# Patient Record
Sex: FEMALE | Race: OTHER | HISPANIC OR LATINO | URBAN - METROPOLITAN AREA
[De-identification: names, ages, dates, MRNs, and addresses within clinical notes are randomized per-mention and may not be internally consistent; named-entity substitution may affect disease eponyms.]

---

## 2017-01-06 ENCOUNTER — EMERGENCY (EMERGENCY)
Facility: HOSPITAL | Age: 82
LOS: 1 days | Discharge: HOME | End: 2017-01-06
Attending: EMERGENCY MEDICINE | Admitting: EMERGENCY MEDICINE
Payer: MEDICARE

## 2017-01-06 VITALS
TEMPERATURE: 98 F | RESPIRATION RATE: 16 BRPM | OXYGEN SATURATION: 97 % | SYSTOLIC BLOOD PRESSURE: 75 MMHG | HEART RATE: 76 BPM | DIASTOLIC BLOOD PRESSURE: 45 MMHG

## 2017-01-06 DIAGNOSIS — I95.9 HYPOTENSION, UNSPECIFIED: ICD-10-CM

## 2017-01-06 DIAGNOSIS — N18.9 CHRONIC KIDNEY DISEASE, UNSPECIFIED: ICD-10-CM

## 2017-01-06 DIAGNOSIS — F41.9 ANXIETY DISORDER, UNSPECIFIED: ICD-10-CM

## 2017-01-06 DIAGNOSIS — F03.90 UNSPECIFIED DEMENTIA, UNSPECIFIED SEVERITY, WITHOUT BEHAVIORAL DISTURBANCE, PSYCHOTIC DISTURBANCE, MOOD DISTURBANCE, AND ANXIETY: ICD-10-CM

## 2017-01-06 DIAGNOSIS — I50.9 HEART FAILURE, UNSPECIFIED: ICD-10-CM

## 2017-01-06 DIAGNOSIS — Z79.82 LONG TERM (CURRENT) USE OF ASPIRIN: ICD-10-CM

## 2017-01-06 DIAGNOSIS — I48.91 UNSPECIFIED ATRIAL FIBRILLATION: ICD-10-CM

## 2017-01-06 DIAGNOSIS — I10 ESSENTIAL (PRIMARY) HYPERTENSION: ICD-10-CM

## 2017-01-06 DIAGNOSIS — D50.9 IRON DEFICIENCY ANEMIA, UNSPECIFIED: ICD-10-CM

## 2017-01-06 DIAGNOSIS — M06.9 RHEUMATOID ARTHRITIS, UNSPECIFIED: ICD-10-CM

## 2017-01-06 PROCEDURE — 99283 EMERGENCY DEPT VISIT LOW MDM: CPT

## 2017-01-06 PROCEDURE — 99283 EMERGENCY DEPT VISIT LOW MDM: CPT | Mod: 25

## 2017-01-06 NOTE — ED ADULT NURSE NOTE - PMH
Atrial fibrillation    Chronic kidney disease  stage 3 moderate  Chronic pain    Constipation    Dementia    Dry eye    Generalized anxiety disorder    GERD (gastroesophageal reflux disease)    Heart failure    HTN (hypertension)    Iron deficiency anemia    RA (rheumatoid arthritis)

## 2017-01-06 NOTE — ED ADULT NURSE NOTE - OBJECTIVE STATEMENT
received pt via EMS awake with low B?P and sent to ER for low B?P pt DNR AND DNI and no IV FLUID  as per  DNR form  and pt came to ER with left arm iv fluids in progress by EMS awaiting evaluation

## 2017-01-07 VITALS — SYSTOLIC BLOOD PRESSURE: 85 MMHG | DIASTOLIC BLOOD PRESSURE: 54 MMHG

## 2017-01-07 NOTE — ED PROVIDER NOTE - MEDICAL DECISION MAKING DETAILS
Patient with very clear MOLST form that did not want many medical interventions. Patient will be transferred back to rehabilitation facility.

## 2017-01-07 NOTE — ED PROVIDER NOTE - OBJECTIVE STATEMENT
85 year old female with a CHF, CKD, AFib comes to the ER from [facility] with the presenting symptom of hypotension. The patient herself has no complaints and feels well. Patient comes with a MOLST form stating no blood draws, no IVF, and comfort measures only. Patient currently is able to communicate clearly that she is not in any discomfort.

## 2017-01-07 NOTE — ED ADULT NURSE REASSESSMENT NOTE - NS ED NURSE REASSESS COMMENT FT1
pt sleeping with no c/o of disconfort vital signsimproved awaiting disposition
MD  spoke  to family member and pt returning  back to facility via EMS

## 2017-01-07 NOTE — ED PROVIDER NOTE - PROGRESS NOTE DETAILS
Had a prolonged conversation with the patient's daughter regarding goals of care. Given that patient is not in any distress and feeling well, we discussed her being sent back to [facility]. Daughter was aware that mother is ill, however, was in agreement that her mother would have wanted to not be admitted or seek anything other than comfort care. Patient will be sent back to [facility].

## 2018-02-06 ENCOUNTER — INPATIENT (INPATIENT)
Facility: HOSPITAL | Age: 83
LOS: 8 days | Discharge: NURSING, REHAB OR LONG-TERM CARE | DRG: 689 | End: 2018-02-15
Attending: INTERNAL MEDICINE | Admitting: INTERNAL MEDICINE
Payer: MEDICARE

## 2018-02-06 VITALS
WEIGHT: 89.95 LBS | SYSTOLIC BLOOD PRESSURE: 131 MMHG | HEART RATE: 106 BPM | HEIGHT: 57 IN | TEMPERATURE: 101 F | OXYGEN SATURATION: 94 % | DIASTOLIC BLOOD PRESSURE: 72 MMHG

## 2018-02-06 DIAGNOSIS — N30.00 ACUTE CYSTITIS WITHOUT HEMATURIA: ICD-10-CM

## 2018-02-06 DIAGNOSIS — R56.00 SIMPLE FEBRILE CONVULSIONS: ICD-10-CM

## 2018-02-06 DIAGNOSIS — I10 ESSENTIAL (PRIMARY) HYPERTENSION: ICD-10-CM

## 2018-02-06 DIAGNOSIS — G93.41 METABOLIC ENCEPHALOPATHY: ICD-10-CM

## 2018-02-06 DIAGNOSIS — I48.2 CHRONIC ATRIAL FIBRILLATION: ICD-10-CM

## 2018-02-06 DIAGNOSIS — I69.319 UNSPECIFIED SYMPTOMS AND SIGNS INVOLVING COGNITIVE FUNCTIONS FOLLOWING CEREBRAL INFARCTION: ICD-10-CM

## 2018-02-06 DIAGNOSIS — I42.9 CARDIOMYOPATHY, UNSPECIFIED: ICD-10-CM

## 2018-02-06 DIAGNOSIS — R41.82 ALTERED MENTAL STATUS, UNSPECIFIED: ICD-10-CM

## 2018-02-06 PROBLEM — D50.9 IRON DEFICIENCY ANEMIA, UNSPECIFIED: Chronic | Status: ACTIVE | Noted: 2017-01-06

## 2018-02-06 PROBLEM — I48.91 UNSPECIFIED ATRIAL FIBRILLATION: Chronic | Status: ACTIVE | Noted: 2017-01-06

## 2018-02-06 PROBLEM — K59.00 CONSTIPATION, UNSPECIFIED: Chronic | Status: ACTIVE | Noted: 2017-01-06

## 2018-02-06 PROBLEM — I50.9 HEART FAILURE, UNSPECIFIED: Chronic | Status: ACTIVE | Noted: 2017-01-06

## 2018-02-06 PROBLEM — M06.9 RHEUMATOID ARTHRITIS, UNSPECIFIED: Chronic | Status: ACTIVE | Noted: 2017-01-06

## 2018-02-06 PROBLEM — N18.9 CHRONIC KIDNEY DISEASE, UNSPECIFIED: Chronic | Status: ACTIVE | Noted: 2017-01-06

## 2018-02-06 PROBLEM — G89.29 OTHER CHRONIC PAIN: Chronic | Status: ACTIVE | Noted: 2017-01-06

## 2018-02-06 PROBLEM — K21.9 GASTRO-ESOPHAGEAL REFLUX DISEASE WITHOUT ESOPHAGITIS: Chronic | Status: ACTIVE | Noted: 2017-01-06

## 2018-02-06 PROBLEM — F03.90 UNSPECIFIED DEMENTIA, UNSPECIFIED SEVERITY, WITHOUT BEHAVIORAL DISTURBANCE, PSYCHOTIC DISTURBANCE, MOOD DISTURBANCE, AND ANXIETY: Chronic | Status: ACTIVE | Noted: 2017-01-06

## 2018-02-06 PROBLEM — H04.129 DRY EYE SYNDROME OF UNSPECIFIED LACRIMAL GLAND: Chronic | Status: ACTIVE | Noted: 2017-01-06

## 2018-02-06 PROBLEM — F41.1 GENERALIZED ANXIETY DISORDER: Chronic | Status: ACTIVE | Noted: 2017-01-06

## 2018-02-06 LAB
ALBUMIN SERPL ELPH-MCNC: 3.1 G/DL — LOW (ref 3.3–5)
ALP SERPL-CCNC: 57 U/L — SIGNIFICANT CHANGE UP (ref 30–120)
ALT FLD-CCNC: 24 U/L DA — SIGNIFICANT CHANGE UP (ref 10–60)
ANION GAP SERPL CALC-SCNC: 9 MMOL/L — SIGNIFICANT CHANGE UP (ref 5–17)
APPEARANCE UR: CLEAR — SIGNIFICANT CHANGE UP
APTT BLD: 30.9 SEC — SIGNIFICANT CHANGE UP (ref 27.5–37.4)
AST SERPL-CCNC: 17 U/L — SIGNIFICANT CHANGE UP (ref 10–40)
BASOPHILS # BLD AUTO: 0.1 K/UL — SIGNIFICANT CHANGE UP (ref 0–0.2)
BASOPHILS NFR BLD AUTO: 1.5 % — SIGNIFICANT CHANGE UP (ref 0–2)
BILIRUB SERPL-MCNC: 0.4 MG/DL — SIGNIFICANT CHANGE UP (ref 0.2–1.2)
BILIRUB UR-MCNC: NEGATIVE — SIGNIFICANT CHANGE UP
BUN SERPL-MCNC: 28 MG/DL — HIGH (ref 7–23)
CALCIUM SERPL-MCNC: 8.3 MG/DL — LOW (ref 8.4–10.5)
CHLORIDE SERPL-SCNC: 111 MMOL/L — HIGH (ref 96–108)
CO2 SERPL-SCNC: 27 MMOL/L — SIGNIFICANT CHANGE UP (ref 22–31)
COLOR SPEC: YELLOW — SIGNIFICANT CHANGE UP
CREAT SERPL-MCNC: 1.2 MG/DL — SIGNIFICANT CHANGE UP (ref 0.5–1.3)
DIFF PNL FLD: ABNORMAL
EOSINOPHIL # BLD AUTO: 0 K/UL — SIGNIFICANT CHANGE UP (ref 0–0.5)
EOSINOPHIL NFR BLD AUTO: 0.8 % — SIGNIFICANT CHANGE UP (ref 0–6)
FLUAV SPEC QL CULT: NEGATIVE — SIGNIFICANT CHANGE UP
FLUBV AG SPEC QL IA: NEGATIVE — SIGNIFICANT CHANGE UP
GLUCOSE SERPL-MCNC: 130 MG/DL — HIGH (ref 70–99)
GLUCOSE UR QL: NEGATIVE MG/DL — SIGNIFICANT CHANGE UP
HCT VFR BLD CALC: 30.2 % — LOW (ref 34.5–45)
HGB BLD-MCNC: 10 G/DL — LOW (ref 11.5–15.5)
INR BLD: 1.08 RATIO — SIGNIFICANT CHANGE UP (ref 0.88–1.16)
KETONES UR-MCNC: NEGATIVE — SIGNIFICANT CHANGE UP
LACTATE SERPL-SCNC: 1.4 MMOL/L — SIGNIFICANT CHANGE UP (ref 0.7–2)
LEUKOCYTE ESTERASE UR-ACNC: ABNORMAL
LYMPHOCYTES # BLD AUTO: 0.6 K/UL — LOW (ref 1–3.3)
LYMPHOCYTES # BLD AUTO: 11.7 % — LOW (ref 13–44)
MCHC RBC-ENTMCNC: 30.5 PG — SIGNIFICANT CHANGE UP (ref 27–34)
MCHC RBC-ENTMCNC: 33.2 GM/DL — SIGNIFICANT CHANGE UP (ref 32–36)
MCV RBC AUTO: 91.9 FL — SIGNIFICANT CHANGE UP (ref 80–100)
MONOCYTES # BLD AUTO: 0.2 K/UL — SIGNIFICANT CHANGE UP (ref 0–0.9)
MONOCYTES NFR BLD AUTO: 4.7 % — SIGNIFICANT CHANGE UP (ref 2–14)
NEUTROPHILS # BLD AUTO: 3.9 K/UL — SIGNIFICANT CHANGE UP (ref 1.8–7.4)
NEUTROPHILS NFR BLD AUTO: 81.2 % — HIGH (ref 43–77)
NITRITE UR-MCNC: POSITIVE
PH UR: 6 — SIGNIFICANT CHANGE UP (ref 5–8)
PLATELET # BLD AUTO: 138 K/UL — LOW (ref 150–400)
POTASSIUM SERPL-MCNC: 4.1 MMOL/L — SIGNIFICANT CHANGE UP (ref 3.5–5.3)
POTASSIUM SERPL-SCNC: 4.1 MMOL/L — SIGNIFICANT CHANGE UP (ref 3.5–5.3)
PROT SERPL-MCNC: 6.5 G/DL — SIGNIFICANT CHANGE UP (ref 6–8.3)
PROT UR-MCNC: 100 MG/DL
PROTHROM AB SERPL-ACNC: 11.8 SEC — SIGNIFICANT CHANGE UP (ref 9.8–12.7)
RBC # BLD: 3.29 M/UL — LOW (ref 3.8–5.2)
RBC # FLD: 13.2 % — SIGNIFICANT CHANGE UP (ref 10.3–14.5)
SODIUM SERPL-SCNC: 147 MMOL/L — HIGH (ref 135–145)
SP GR SPEC: 1.01 — SIGNIFICANT CHANGE UP (ref 1.01–1.02)
UROBILINOGEN FLD QL: NEGATIVE MG/DL — SIGNIFICANT CHANGE UP
WBC # BLD: 4.8 K/UL — SIGNIFICANT CHANGE UP (ref 3.8–10.5)
WBC # FLD AUTO: 4.8 K/UL — SIGNIFICANT CHANGE UP (ref 3.8–10.5)

## 2018-02-06 PROCEDURE — 99223 1ST HOSP IP/OBS HIGH 75: CPT | Mod: AI

## 2018-02-06 PROCEDURE — 70450 CT HEAD/BRAIN W/O DYE: CPT | Mod: 26

## 2018-02-06 PROCEDURE — 93010 ELECTROCARDIOGRAM REPORT: CPT

## 2018-02-06 PROCEDURE — 99223 1ST HOSP IP/OBS HIGH 75: CPT

## 2018-02-06 PROCEDURE — 99285 EMERGENCY DEPT VISIT HI MDM: CPT

## 2018-02-06 PROCEDURE — 71045 X-RAY EXAM CHEST 1 VIEW: CPT | Mod: 26

## 2018-02-06 RX ORDER — ASPIRIN/CALCIUM CARB/MAGNESIUM 324 MG
300 TABLET ORAL ONCE
Qty: 0 | Refills: 0 | Status: COMPLETED | OUTPATIENT
Start: 2018-02-06 | End: 2018-02-06

## 2018-02-06 RX ORDER — ACETAMINOPHEN 500 MG
650 TABLET ORAL ONCE
Qty: 0 | Refills: 0 | Status: COMPLETED | OUTPATIENT
Start: 2018-02-06 | End: 2018-02-06

## 2018-02-06 RX ORDER — ASPIRIN/CALCIUM CARB/MAGNESIUM 324 MG
325 TABLET ORAL DAILY
Qty: 0 | Refills: 0 | Status: DISCONTINUED | OUTPATIENT
Start: 2018-02-06 | End: 2018-02-06

## 2018-02-06 RX ORDER — SODIUM CHLORIDE 9 MG/ML
3 INJECTION INTRAMUSCULAR; INTRAVENOUS; SUBCUTANEOUS ONCE
Qty: 0 | Refills: 0 | Status: COMPLETED | OUTPATIENT
Start: 2018-02-06 | End: 2018-02-06

## 2018-02-06 RX ORDER — METOPROLOL TARTRATE 50 MG
2.5 TABLET ORAL EVERY 6 HOURS
Qty: 0 | Refills: 0 | Status: DISCONTINUED | OUTPATIENT
Start: 2018-02-06 | End: 2018-02-09

## 2018-02-06 RX ORDER — VANCOMYCIN HCL 1 G
750 VIAL (EA) INTRAVENOUS ONCE
Qty: 0 | Refills: 0 | Status: COMPLETED | OUTPATIENT
Start: 2018-02-06 | End: 2018-02-06

## 2018-02-06 RX ORDER — SODIUM CHLORIDE 9 MG/ML
1000 INJECTION INTRAMUSCULAR; INTRAVENOUS; SUBCUTANEOUS ONCE
Qty: 0 | Refills: 0 | Status: COMPLETED | OUTPATIENT
Start: 2018-02-06 | End: 2018-02-06

## 2018-02-06 RX ORDER — SODIUM CHLORIDE 9 MG/ML
1000 INJECTION, SOLUTION INTRAVENOUS
Qty: 0 | Refills: 0 | Status: DISCONTINUED | OUTPATIENT
Start: 2018-02-06 | End: 2018-02-08

## 2018-02-06 RX ORDER — HEPARIN SODIUM 5000 [USP'U]/ML
5000 INJECTION INTRAVENOUS; SUBCUTANEOUS EVERY 12 HOURS
Qty: 0 | Refills: 0 | Status: DISCONTINUED | OUTPATIENT
Start: 2018-02-06 | End: 2018-02-15

## 2018-02-06 RX ORDER — CEFTRIAXONE 500 MG/1
1 INJECTION, POWDER, FOR SOLUTION INTRAMUSCULAR; INTRAVENOUS EVERY 24 HOURS
Qty: 0 | Refills: 0 | Status: COMPLETED | OUTPATIENT
Start: 2018-02-06 | End: 2018-02-08

## 2018-02-06 RX ORDER — CEFTRIAXONE 500 MG/1
2 INJECTION, POWDER, FOR SOLUTION INTRAMUSCULAR; INTRAVENOUS ONCE
Qty: 0 | Refills: 0 | Status: COMPLETED | OUTPATIENT
Start: 2018-02-06 | End: 2018-02-06

## 2018-02-06 RX ORDER — ASPIRIN/CALCIUM CARB/MAGNESIUM 324 MG
325 TABLET ORAL DAILY
Qty: 0 | Refills: 0 | Status: DISCONTINUED | OUTPATIENT
Start: 2018-02-06 | End: 2018-02-09

## 2018-02-06 RX ADMIN — Medication 650 MILLIGRAM(S): at 05:20

## 2018-02-06 RX ADMIN — SODIUM CHLORIDE 1000 MILLILITER(S): 9 INJECTION INTRAMUSCULAR; INTRAVENOUS; SUBCUTANEOUS at 06:20

## 2018-02-06 RX ADMIN — SODIUM CHLORIDE 3 MILLILITER(S): 9 INJECTION INTRAMUSCULAR; INTRAVENOUS; SUBCUTANEOUS at 06:03

## 2018-02-06 RX ADMIN — SODIUM CHLORIDE 50 MILLILITER(S): 9 INJECTION, SOLUTION INTRAVENOUS at 10:35

## 2018-02-06 RX ADMIN — Medication 2.5 MILLIGRAM(S): at 17:56

## 2018-02-06 RX ADMIN — SODIUM CHLORIDE 1000 MILLILITER(S): 9 INJECTION INTRAMUSCULAR; INTRAVENOUS; SUBCUTANEOUS at 05:20

## 2018-02-06 RX ADMIN — CEFTRIAXONE 100 GRAM(S): 500 INJECTION, POWDER, FOR SOLUTION INTRAMUSCULAR; INTRAVENOUS at 07:20

## 2018-02-06 RX ADMIN — Medication 300 MILLIGRAM(S): at 10:36

## 2018-02-06 RX ADMIN — HEPARIN SODIUM 5000 UNIT(S): 5000 INJECTION INTRAVENOUS; SUBCUTANEOUS at 17:56

## 2018-02-06 RX ADMIN — Medication 150 MILLIGRAM(S): at 07:58

## 2018-02-06 RX ADMIN — Medication 0.5 MILLIGRAM(S): at 12:20

## 2018-02-06 NOTE — ED ADULT NURSE NOTE - OBJECTIVE STATEMENT
pt BIBA from [facility] , s/p seizure at 0345, EMS arrived at the scene at 0412 given Midazolam 10mg  IM, seizure  subsided at 0417.  upon arrival pt unresponsive, on CM, VSS. respiration even and unlabored,

## 2018-02-06 NOTE — CONSULT NOTE ADULT - ATTENDING COMMENTS
Advanced care planning was discussed with family. Pt is DNR/DNI.  Pain is accessed and addressed. Pt has no pain currently.  Pt was screened for signs of clinical depression. No signs of depression.  Risk of falls accessed. Fall prevention and plan of care is in place.  Pt is screened for tobacco and alcohol use. Pt doesn't smoke or drink.  Use of narcotic pain meds was discussed. Pt is advised to use narcotic meds wisely and to refrain from over using them.  Plan of care was discussed with patient family. Questions answered.  Would continue to follow.

## 2018-02-06 NOTE — H&P ADULT - ASSESSMENT
86 y.o. F found by NH having seizure episode - no h/o seizures in the past - EMS gave versed 10mg IM in the ambulance with seizure halting about 2minutes later, had already been at least 20minutes of seizure episode by then, in ED found to be febrile, information limited, pt has AMS, spoke with daughter  - MOLST states DNR/DNI/no hospitalizations/limited interventions - at this time daughter wants everything done, but not sure about invasive studies, discussed LP, will start with labs/ct head/give 1st dose abx and rediscuss with daughter.  In ED pt was found to have Right sided weakness and she was not able to speak..  NIHSS couldn't be accessed accurately.  Pt is not a tPa candidate as her symptoms onset time is NOT known.  Daughter is at bedside.  In ED pt has elevated serum Na. Volume depletion and also signs of UTI.

## 2018-02-06 NOTE — H&P ADULT - NSHPSOCIALHISTORY_GEN_ALL_CORE
SOCIAL HISTORY:  Smoker:  NO        PACK YEARS:                         WHEN QUIT?  ETOH use:  NO               FREQUENCY / QUANTITY:  Ilicit Drug use:  NO

## 2018-02-06 NOTE — ED PROVIDER NOTE - MEDICAL DECISION MAKING DETAILS
86 y.o. F BIBEMS with new onset seizure, found to be febrile in ED, sepsis work up, abx, 86 y.o. F BIBEMS with new onset seizure, found to be febrile in ED, sepsis work up, abx, admit

## 2018-02-06 NOTE — H&P ADULT - PROBLEM SELECTOR PLAN 1
CT Head - No acute pathology. Old Infarcts.  CVA in Left MCA area clinically.  Admit to monitored bed.  NPO   SD  Pt is DNR/DNI.  Limited CVA w/up will be done with agreement of daughter at bedside.  Would do repeat CT head in 48 hrs.  Carotid doppler/2D Echo will NOT be done.  Lipid panel/HbA1c.  Dysphagia eval.  PT Eval/Speech and swallowing eval.  PT Eval. CT Head - No acute pathology. Old Infarcts.  CVA in Left MCA area clinically.  Admit to monitored bed.  NPO   GA  Pt is DNR/DNI.  Limited CVA w/up will be done with agreement of daughter at bedside.  Would do repeat CT head in 48 hrs.  Carotid doppler/2D Echo will NOT be done.  Lipid panel/HbA1c.  Dysphagia eval.  PT Eval/Speech and swallowing eval.  PT Eval.  Febrile seizure- due to infection.

## 2018-02-06 NOTE — ED ADULT TRIAGE NOTE - BP NONINVASIVE DIASTOLIC (MM HG)
Post Anesthetic Evaluation


Cardiovascular Status: Normal, Stable


Respiratory Status: Normal, Stable


Level of Consciousness/Mental Status: Other, See Comment (Pt is demented and 

extremely hard of hearing. Difficult to communicate.)


Pain Control: Adequate, Prn Tx Ordered


Nausea/Vomiting Control: Adequate, Prn Tx Ordered


Complications Possibly Related to Anesthesia: None Noted
72

## 2018-02-06 NOTE — CONSULT NOTE ADULT - PROBLEM SELECTOR RECOMMENDATION 4
mild chronic global hypokinesis , no clinical CHF , continue IV lopressor for now ,     will add oral BB ,ACE once her mental status improves     Patient is DNR DNI , supportive care

## 2018-02-06 NOTE — ED ADULT NURSE REASSESSMENT NOTE - NS ED NURSE REASSESS COMMENT FT1
pt responding to verbal stimuli, CT scan done, VSS, no distress noted, on o2 2L N/C, spo2 96%, will monitor.

## 2018-02-06 NOTE — H&P ADULT - MENTAL STATUS
Pt is awake and confused. Moves left sided extremities equally. Right UE 0/5.   Pt withdraws all extremities equally on stimulation.   Gait - Couldn't be accessed.  Speech - Non verbal currently.

## 2018-02-06 NOTE — GOALS OF CARE CONVERSATION - PERSONAL ADVANCE DIRECTIVE - CONVERSATION DETAILS
Spoke to pts daughter regarding her condition and advance directives. She states that she is still DNR/DNI and does not want very aggressive treatment. We discussed comfort measures only but she wants to give the pt a day to see how pt progresses before making any decisions

## 2018-02-06 NOTE — H&P ADULT - ATTENDING COMMENTS
D/w daughter at bedside. Questions answered.  prognosis is poor.  Advance care planning d/w pt. Time spent- 15 minutes. D/w daughter at bedside. Questions answered.  prognosis is poor.  Advance care planning d/w pt. Time spent- 15 minutes.  PMD notified.

## 2018-02-06 NOTE — ED PROVIDER NOTE - PMH
Age related osteoporosis    Atrial fibrillation    Atrial fibrillation    Chronic kidney disease  stage 3 moderate  Chronic pain    Constipation    Dementia    Dry eye    Generalized anxiety disorder    GERD (gastroesophageal reflux disease)    Heart failure    HTN (hypertension)    HTN (hypertension)    Hyperlipidemia    Iron deficiency anemia    Osteoarthritis    RA (rheumatoid arthritis)    Subdural hematoma

## 2018-02-06 NOTE — ED PROVIDER NOTE - OBJECTIVE STATEMENT
86 y.o. F found by NH having seizure episode - no h/o seizures in the past - EMS gave versed 10mg IM in the ambulance with seizure halting about 2minutes later, had already been at least 20minutes of seizure episode by then, in ED found to be febrile, information limited, pt unresponsive at this time, spoke with daughter - HCP Maida Perea on the phone - MOLST states DNR/DNI/no hospitalizations/limited interventions - at this time daughter wants everything done, but not sure about invasive studies, discussed LP, will start with labs/ct head/give 1st dose abx and rediscuss with daughter

## 2018-02-06 NOTE — PATIENT PROFILE ADULT. - LANGUAGE ASSISTANCE NEEDED
No-Patient/Caregiver offered and refused free interpretation services. confused/No-Patient/Caregiver offered and refused free interpretation services.

## 2018-02-07 DIAGNOSIS — I42.8 OTHER CARDIOMYOPATHIES: ICD-10-CM

## 2018-02-07 LAB
ANION GAP SERPL CALC-SCNC: 10 MMOL/L — SIGNIFICANT CHANGE UP (ref 5–17)
BUN SERPL-MCNC: 14 MG/DL — SIGNIFICANT CHANGE UP (ref 7–23)
CALCIUM SERPL-MCNC: 8.2 MG/DL — LOW (ref 8.4–10.5)
CHLORIDE SERPL-SCNC: 109 MMOL/L — HIGH (ref 96–108)
CO2 SERPL-SCNC: 24 MMOL/L — SIGNIFICANT CHANGE UP (ref 22–31)
CREAT SERPL-MCNC: 0.92 MG/DL — SIGNIFICANT CHANGE UP (ref 0.5–1.3)
GLUCOSE SERPL-MCNC: 111 MG/DL — HIGH (ref 70–99)
HCT VFR BLD CALC: 32.4 % — LOW (ref 34.5–45)
HGB BLD-MCNC: 10.4 G/DL — LOW (ref 11.5–15.5)
MCHC RBC-ENTMCNC: 29.3 PG — SIGNIFICANT CHANGE UP (ref 27–34)
MCHC RBC-ENTMCNC: 32.2 GM/DL — SIGNIFICANT CHANGE UP (ref 32–36)
MCV RBC AUTO: 91 FL — SIGNIFICANT CHANGE UP (ref 80–100)
PLATELET # BLD AUTO: 111 K/UL — LOW (ref 150–400)
POTASSIUM SERPL-MCNC: 3.5 MMOL/L — SIGNIFICANT CHANGE UP (ref 3.5–5.3)
POTASSIUM SERPL-SCNC: 3.5 MMOL/L — SIGNIFICANT CHANGE UP (ref 3.5–5.3)
RBC # BLD: 3.56 M/UL — LOW (ref 3.8–5.2)
RBC # FLD: 12.8 % — SIGNIFICANT CHANGE UP (ref 10.3–14.5)
SODIUM SERPL-SCNC: 143 MMOL/L — SIGNIFICANT CHANGE UP (ref 135–145)
WBC # BLD: 5.9 K/UL — SIGNIFICANT CHANGE UP (ref 3.8–10.5)
WBC # FLD AUTO: 5.9 K/UL — SIGNIFICANT CHANGE UP (ref 3.8–10.5)

## 2018-02-07 PROCEDURE — 93306 TTE W/DOPPLER COMPLETE: CPT | Mod: 26

## 2018-02-07 PROCEDURE — 99233 SBSQ HOSP IP/OBS HIGH 50: CPT | Mod: 25

## 2018-02-07 PROCEDURE — 99233 SBSQ HOSP IP/OBS HIGH 50: CPT

## 2018-02-07 RX ADMIN — Medication 2.5 MILLIGRAM(S): at 00:16

## 2018-02-07 RX ADMIN — Medication 2.5 MILLIGRAM(S): at 18:03

## 2018-02-07 RX ADMIN — Medication 0.5 MILLIGRAM(S): at 20:59

## 2018-02-07 RX ADMIN — CEFTRIAXONE 100 GRAM(S): 500 INJECTION, POWDER, FOR SOLUTION INTRAMUSCULAR; INTRAVENOUS at 06:31

## 2018-02-07 RX ADMIN — HEPARIN SODIUM 5000 UNIT(S): 5000 INJECTION INTRAVENOUS; SUBCUTANEOUS at 05:32

## 2018-02-07 RX ADMIN — Medication 2.5 MILLIGRAM(S): at 05:32

## 2018-02-07 RX ADMIN — HEPARIN SODIUM 5000 UNIT(S): 5000 INJECTION INTRAVENOUS; SUBCUTANEOUS at 18:03

## 2018-02-07 RX ADMIN — Medication 2.5 MILLIGRAM(S): at 13:33

## 2018-02-07 RX ADMIN — SODIUM CHLORIDE 50 MILLILITER(S): 9 INJECTION, SOLUTION INTRAVENOUS at 06:32

## 2018-02-07 RX ADMIN — Medication 0.5 MILLIGRAM(S): at 16:18

## 2018-02-07 NOTE — PHYSICAL THERAPY INITIAL EVALUATION ADULT - PASSIVE RANGE OF MOTION EXAMINATION, REHAB EVAL
Bilateral UE PROM limitation./bilateral lower extremity Passive ROM was WFL (within functional limits)

## 2018-02-07 NOTE — SWALLOW BEDSIDE ASSESSMENT ADULT - COMMENTS
Pt alert, confused. Pt admitted with seizure activity, AMS, evidence of old infarcts in CT. Pt's daughter at bedside; she reported that pt was consuming regular consistencies with thin liquids PTA. Pt presents with oropharyngeal dysphagia characterized by reduced oral prep, no manipulation of the bolus, no AP transport, absent swallow trigger. Pt required suctioning. She cannot tolerate the most conservative consistencies at this time. Recommend continue NPO, consider alternative means of nutrition.

## 2018-02-07 NOTE — PROGRESS NOTE ADULT - ASSESSMENT
Seen for AMS/Right sided weakness/Global aphasia.  CT Head - No acute pathology. Old Infarcts.  CVA in Left MCA area clinically.   NC  Pt is DNR/DNI.  Limited CVA w/up is being done with agreement of daughter.  Repeat CT head in AM.  Carotid doppler/2D Echo will NOT be done. Ecoli UTI On antibiotics.  D/w daughter at bedside. Questions answered.  Would continue to follow.

## 2018-02-07 NOTE — PROGRESS NOTE ADULT - ASSESSMENT
PROBLEM Dx:  Other cardiomyopathy: Other cardiomyopathy  Metabolic encephalopathy: Metabolic encephalopathy  Essential hypertension: Essential hypertension  Acute cystitis without hematuria: Acute cystitis without hematuria  Altered mental status, unspecified altered mental status type: Altered mental status, unspecified altered mental status type  Cardiomyopathy: Cardiomyopathy  CVA, old, cognitive deficits: CVA, old, cognitive deficits  Chronic atrial fibrillation: Chronic atrial fibrillation  Febrile seizure: Febrile seizure           ·  Problem: Chronic atrial fibrillation.     episodes  rapid rate due to fever and agitation  ,will give IV lopressor 2.5 mg Q  q6h      ·  Problem: CVA, old, cognitive deficits.    CVA , no AC due to SD hematoma , fall risk.        ·  Problem: Cardiomyopathy.    : mild chronic global hypokinesis ,   no clinical CHF ,   continue IV lopressor for now ,     will add oral BB ,ACE once her mental status improves     Patient is DNR DNI , supportive care.    discussed with staff/neurologist

## 2018-02-07 NOTE — PHYSICAL THERAPY INITIAL EVALUATION ADULT - PERTINENT HX OF CURRENT PROBLEM, REHAB EVAL
86 y.o. F found by NH having seizure episode - no h/o seizures in the past - EMS gave versed 10mg IM in the ambulance with seizure halting about 2minutes later, had already been at least 20minutes of seizure episode by then, in ED found to be febrile, information limited, pt has AMS

## 2018-02-08 LAB
-  AMIKACIN: SIGNIFICANT CHANGE UP
-  AMPICILLIN/SULBACTAM: SIGNIFICANT CHANGE UP
-  AMPICILLIN: SIGNIFICANT CHANGE UP
-  AZTREONAM: SIGNIFICANT CHANGE UP
-  CEFAZOLIN: SIGNIFICANT CHANGE UP
-  CEFEPIME: SIGNIFICANT CHANGE UP
-  CEFOXITIN: SIGNIFICANT CHANGE UP
-  CEFTAZIDIME: SIGNIFICANT CHANGE UP
-  CEFTRIAXONE: SIGNIFICANT CHANGE UP
-  CIPROFLOXACIN: SIGNIFICANT CHANGE UP
-  ERTAPENEM: SIGNIFICANT CHANGE UP
-  GENTAMICIN: SIGNIFICANT CHANGE UP
-  IMIPENEM: SIGNIFICANT CHANGE UP
-  LEVOFLOXACIN: SIGNIFICANT CHANGE UP
-  MEROPENEM: SIGNIFICANT CHANGE UP
-  NITROFURANTOIN: SIGNIFICANT CHANGE UP
-  PIPERACILLIN/TAZOBACTAM: SIGNIFICANT CHANGE UP
-  TOBRAMYCIN: SIGNIFICANT CHANGE UP
-  TRIMETHOPRIM/SULFAMETHOXAZOLE: SIGNIFICANT CHANGE UP
ANION GAP SERPL CALC-SCNC: 13 MMOL/L — SIGNIFICANT CHANGE UP (ref 5–17)
BUN SERPL-MCNC: 12 MG/DL — SIGNIFICANT CHANGE UP (ref 7–23)
CALCIUM SERPL-MCNC: 8.4 MG/DL — SIGNIFICANT CHANGE UP (ref 8.4–10.5)
CHLORIDE SERPL-SCNC: 107 MMOL/L — SIGNIFICANT CHANGE UP (ref 96–108)
CO2 SERPL-SCNC: 24 MMOL/L — SIGNIFICANT CHANGE UP (ref 22–31)
CREAT SERPL-MCNC: 1.02 MG/DL — SIGNIFICANT CHANGE UP (ref 0.5–1.3)
CULTURE RESULTS: SIGNIFICANT CHANGE UP
GLUCOSE SERPL-MCNC: 98 MG/DL — SIGNIFICANT CHANGE UP (ref 70–99)
HCT VFR BLD CALC: 33.3 % — LOW (ref 34.5–45)
HGB BLD-MCNC: 11.1 G/DL — LOW (ref 11.5–15.5)
MCHC RBC-ENTMCNC: 30.5 PG — SIGNIFICANT CHANGE UP (ref 27–34)
MCHC RBC-ENTMCNC: 33.5 GM/DL — SIGNIFICANT CHANGE UP (ref 32–36)
MCV RBC AUTO: 91.1 FL — SIGNIFICANT CHANGE UP (ref 80–100)
METHOD TYPE: SIGNIFICANT CHANGE UP
ORGANISM # SPEC MICROSCOPIC CNT: SIGNIFICANT CHANGE UP
ORGANISM # SPEC MICROSCOPIC CNT: SIGNIFICANT CHANGE UP
PLATELET # BLD AUTO: 124 K/UL — LOW (ref 150–400)
POTASSIUM SERPL-MCNC: 2.7 MMOL/L — CRITICAL LOW (ref 3.5–5.3)
POTASSIUM SERPL-SCNC: 2.7 MMOL/L — CRITICAL LOW (ref 3.5–5.3)
RBC # BLD: 3.65 M/UL — LOW (ref 3.8–5.2)
RBC # FLD: 12.7 % — SIGNIFICANT CHANGE UP (ref 10.3–14.5)
SODIUM SERPL-SCNC: 144 MMOL/L — SIGNIFICANT CHANGE UP (ref 135–145)
SPECIMEN SOURCE: SIGNIFICANT CHANGE UP
WBC # BLD: 4.8 K/UL — SIGNIFICANT CHANGE UP (ref 3.8–10.5)
WBC # FLD AUTO: 4.8 K/UL — SIGNIFICANT CHANGE UP (ref 3.8–10.5)

## 2018-02-08 PROCEDURE — 99233 SBSQ HOSP IP/OBS HIGH 50: CPT

## 2018-02-08 PROCEDURE — 70450 CT HEAD/BRAIN W/O DYE: CPT | Mod: 26

## 2018-02-08 PROCEDURE — 12345: CPT | Mod: NC

## 2018-02-08 RX ORDER — DEXTROSE MONOHYDRATE, SODIUM CHLORIDE, AND POTASSIUM CHLORIDE 50; .745; 4.5 G/1000ML; G/1000ML; G/1000ML
1000 INJECTION, SOLUTION INTRAVENOUS
Qty: 0 | Refills: 0 | Status: DISCONTINUED | OUTPATIENT
Start: 2018-02-08 | End: 2018-02-12

## 2018-02-08 RX ORDER — POTASSIUM CHLORIDE 20 MEQ
10 PACKET (EA) ORAL
Qty: 0 | Refills: 0 | Status: COMPLETED | OUTPATIENT
Start: 2018-02-08 | End: 2018-02-08

## 2018-02-08 RX ORDER — METOPROLOL TARTRATE 50 MG
2.5 TABLET ORAL ONCE
Qty: 0 | Refills: 0 | Status: COMPLETED | OUTPATIENT
Start: 2018-02-08 | End: 2018-02-08

## 2018-02-08 RX ORDER — CEFTRIAXONE 500 MG/1
1 INJECTION, POWDER, FOR SOLUTION INTRAMUSCULAR; INTRAVENOUS EVERY 24 HOURS
Qty: 0 | Refills: 0 | Status: DISCONTINUED | OUTPATIENT
Start: 2018-02-08 | End: 2018-02-11

## 2018-02-08 RX ADMIN — Medication 0.5 MILLIGRAM(S): at 21:43

## 2018-02-08 RX ADMIN — Medication 2.5 MILLIGRAM(S): at 17:48

## 2018-02-08 RX ADMIN — Medication 100 MILLIEQUIVALENT(S): at 12:46

## 2018-02-08 RX ADMIN — CEFTRIAXONE 100 GRAM(S): 500 INJECTION, POWDER, FOR SOLUTION INTRAMUSCULAR; INTRAVENOUS at 17:48

## 2018-02-08 RX ADMIN — Medication 100 MILLIEQUIVALENT(S): at 09:18

## 2018-02-08 RX ADMIN — DEXTROSE MONOHYDRATE, SODIUM CHLORIDE, AND POTASSIUM CHLORIDE 50 MILLILITER(S): 50; .745; 4.5 INJECTION, SOLUTION INTRAVENOUS at 19:29

## 2018-02-08 RX ADMIN — Medication 2.5 MILLIGRAM(S): at 00:22

## 2018-02-08 RX ADMIN — Medication 100 MILLIEQUIVALENT(S): at 10:33

## 2018-02-08 RX ADMIN — Medication 2.5 MILLIGRAM(S): at 11:19

## 2018-02-08 RX ADMIN — SODIUM CHLORIDE 50 MILLILITER(S): 9 INJECTION, SOLUTION INTRAVENOUS at 05:34

## 2018-02-08 RX ADMIN — HEPARIN SODIUM 5000 UNIT(S): 5000 INJECTION INTRAVENOUS; SUBCUTANEOUS at 05:33

## 2018-02-08 RX ADMIN — SODIUM CHLORIDE 50 MILLILITER(S): 9 INJECTION, SOLUTION INTRAVENOUS at 09:20

## 2018-02-08 RX ADMIN — Medication 2.5 MILLIGRAM(S): at 19:28

## 2018-02-08 RX ADMIN — Medication 2.5 MILLIGRAM(S): at 05:32

## 2018-02-08 RX ADMIN — CEFTRIAXONE 100 GRAM(S): 500 INJECTION, POWDER, FOR SOLUTION INTRAMUSCULAR; INTRAVENOUS at 05:34

## 2018-02-08 NOTE — DIETITIAN INITIAL EVALUATION ADULT. - PROBLEM SELECTOR PLAN 1
CT Head - No acute pathology. Old Infarcts.  CVA in Left MCA area clinically.  Admit to monitored bed.  NPO   FL  Pt is DNR/DNI.  Limited CVA w/up will be done with agreement of daughter at bedside.  Would do repeat CT head in 48 hrs.  Carotid doppler/2D Echo will NOT be done.  Lipid panel/HbA1c.  Dysphagia eval.  PT Eval/Speech and swallowing eval.  PT Eval.  Febrile seizure- due to infection.

## 2018-02-08 NOTE — DIETITIAN INITIAL EVALUATION ADULT. - FACTORS AFF FOOD INTAKE
change in mental status/difficulty feeding self/difficulty with food procurement/preparation/difficulty chewing/difficulty swallowing difficulty feeding self/difficulty swallowing/change in mental status

## 2018-02-08 NOTE — PROGRESS NOTE ADULT - ASSESSMENT
Assessment and Recommendation:    Problem/Recommendation - 1:  Problem: Febrile seizure. Recommendation: as per hospitalist , continue Antibiotics.     Problem/Recommendation - 2:  ·  Problem: Chronic atrial fibrillation.  Recommendation: controlled rate on  IV lopressor 2.5 mg Q 6 h  patient is NPO as she failed swallow, KCL supplement , check mag level      Problem/Recommendation - 3:  ·  Problem: CVA, old, cognitive deficits.  Recommendation: old CVA , no AC due to SD hematoma , fall risk.      Problem/Recommendation - 4:  ·  Problem: Cardiomyopathy.  Recommendation: mild chronic global hypokinesis , no clinical CHF , continue IV lopressor for now ,     will add oral BB ,ACE once her mental status improves     Patient is DNR DNI , supportive care.

## 2018-02-08 NOTE — PROGRESS NOTE ADULT - ASSESSMENT
Seen for AMS/Right sided weakness/Global aphasia - Much improved.  CT Head - No acute pathology. Old Infarcts.  CVA in Left MCA area was suspected clinically.  Limited CVA w/up is done with agreement of daughter.  Repeat CT Head done today shows no acute pathology.  Pt does have lacunar Infarct on left Internal capsule which is reported as old.   Carotid doppler/2D Echo will NOT be done.   Pt is DNR/DNI.   AR  SLP f/up - If pt is able to take medication by mouth - ASA will be reduced to 81 mg PO.  Ecoli UTI On antibiotics.  Would continue to follow.

## 2018-02-08 NOTE — DIETITIAN INITIAL EVALUATION ADULT. - OTHER INFO
85 y/o F admitted w/ seizure r/t CVA.  Pt has PMH of chronic a-fib, osteoporosis, stage 3 CKD, dementia, generalized anxiety disorder, GERD, heart failure, HTN, HLD, iron-deficiency anemia.  Pt appears underweight (BMI 19.5).  Nutrition-focused physical evaluation (NFPE) revealed muscle wasting of temporal, clavical regions; and muscle wasting of calves.  Unable to determine pt's usual body weight.  PTA pt was living at a nursing home ([facility]) on a regular diet w/ thin liquids as per chart review.  As per SLP note, pt noted w/ oropharyngeal dysphagia w/ no swallow reflex; SLP recommended NPO.  MOLST states DNR/DNI/no hospitalizations/limited interventions; however at this time daughter wants everything done as per H & P.   As per 2-8-18 MD note, pt's daughter doesn't want any artificial nutrition, might consider for comfort care.

## 2018-02-08 NOTE — PROVIDER CONTACT NOTE (OTHER) - ASSESSMENT
dried blood noted on lips/gums/  pt in afib, rapid at times, 150's rate to 108-117 after lopressor 2.5 mg ivp

## 2018-02-08 NOTE — PROVIDER CONTACT NOTE (OTHER) - BACKGROUND
given lopressor 2.5 at 1745 for rate 150. responded with rate 108-117. now back up to 130's sustained

## 2018-02-09 ENCOUNTER — TRANSCRIPTION ENCOUNTER (OUTPATIENT)
Age: 83
End: 2018-02-09

## 2018-02-09 LAB
ANION GAP SERPL CALC-SCNC: 11 MMOL/L — SIGNIFICANT CHANGE UP (ref 5–17)
BUN SERPL-MCNC: 12 MG/DL — SIGNIFICANT CHANGE UP (ref 7–23)
CALCIUM SERPL-MCNC: 8.2 MG/DL — LOW (ref 8.4–10.5)
CHLORIDE SERPL-SCNC: 108 MMOL/L — SIGNIFICANT CHANGE UP (ref 96–108)
CO2 SERPL-SCNC: 23 MMOL/L — SIGNIFICANT CHANGE UP (ref 22–31)
CREAT SERPL-MCNC: 1.07 MG/DL — SIGNIFICANT CHANGE UP (ref 0.5–1.3)
GLUCOSE BLDC GLUCOMTR-MCNC: 112 MG/DL — HIGH (ref 70–99)
GLUCOSE SERPL-MCNC: 122 MG/DL — HIGH (ref 70–99)
HCT VFR BLD CALC: 34.1 % — LOW (ref 34.5–45)
HGB BLD-MCNC: 11.3 G/DL — LOW (ref 11.5–15.5)
MCHC RBC-ENTMCNC: 29.5 PG — SIGNIFICANT CHANGE UP (ref 27–34)
MCHC RBC-ENTMCNC: 33 GM/DL — SIGNIFICANT CHANGE UP (ref 32–36)
MCV RBC AUTO: 89.2 FL — SIGNIFICANT CHANGE UP (ref 80–100)
PLATELET # BLD AUTO: 143 K/UL — LOW (ref 150–400)
POTASSIUM SERPL-MCNC: 3.8 MMOL/L — SIGNIFICANT CHANGE UP (ref 3.5–5.3)
POTASSIUM SERPL-SCNC: 3.8 MMOL/L — SIGNIFICANT CHANGE UP (ref 3.5–5.3)
RBC # BLD: 3.82 M/UL — SIGNIFICANT CHANGE UP (ref 3.8–5.2)
RBC # FLD: 12.5 % — SIGNIFICANT CHANGE UP (ref 10.3–14.5)
SODIUM SERPL-SCNC: 142 MMOL/L — SIGNIFICANT CHANGE UP (ref 135–145)
TROPONIN I SERPL-MCNC: 0.03 NG/ML — SIGNIFICANT CHANGE UP (ref 0.02–0.06)
WBC # BLD: 7 K/UL — SIGNIFICANT CHANGE UP (ref 3.8–10.5)
WBC # FLD AUTO: 7 K/UL — SIGNIFICANT CHANGE UP (ref 3.8–10.5)

## 2018-02-09 PROCEDURE — 93010 ELECTROCARDIOGRAM REPORT: CPT

## 2018-02-09 PROCEDURE — 12345: CPT | Mod: NC

## 2018-02-09 PROCEDURE — 99233 SBSQ HOSP IP/OBS HIGH 50: CPT

## 2018-02-09 PROCEDURE — 71045 X-RAY EXAM CHEST 1 VIEW: CPT | Mod: 26

## 2018-02-09 RX ORDER — ASPIRIN/CALCIUM CARB/MAGNESIUM 324 MG
300 TABLET ORAL DAILY
Qty: 0 | Refills: 0 | Status: DISCONTINUED | OUTPATIENT
Start: 2018-02-09 | End: 2018-02-12

## 2018-02-09 RX ORDER — METOPROLOL TARTRATE 50 MG
5 TABLET ORAL EVERY 6 HOURS
Qty: 0 | Refills: 0 | Status: DISCONTINUED | OUTPATIENT
Start: 2018-02-09 | End: 2018-02-11

## 2018-02-09 RX ORDER — ACETAMINOPHEN 500 MG
650 TABLET ORAL EVERY 6 HOURS
Qty: 0 | Refills: 0 | Status: DISCONTINUED | OUTPATIENT
Start: 2018-02-09 | End: 2018-02-14

## 2018-02-09 RX ORDER — METOPROLOL TARTRATE 50 MG
5 TABLET ORAL ONCE
Qty: 0 | Refills: 0 | Status: COMPLETED | OUTPATIENT
Start: 2018-02-09 | End: 2018-02-09

## 2018-02-09 RX ADMIN — Medication 2.5 MILLIGRAM(S): at 00:55

## 2018-02-09 RX ADMIN — Medication 0.5 MILLIGRAM(S): at 19:59

## 2018-02-09 RX ADMIN — Medication 5 MILLIGRAM(S): at 17:15

## 2018-02-09 RX ADMIN — Medication 5 MILLIGRAM(S): at 10:39

## 2018-02-09 RX ADMIN — Medication 300 MILLIGRAM(S): at 17:26

## 2018-02-09 RX ADMIN — Medication 650 MILLIGRAM(S): at 14:20

## 2018-02-09 RX ADMIN — HEPARIN SODIUM 5000 UNIT(S): 5000 INJECTION INTRAVENOUS; SUBCUTANEOUS at 05:34

## 2018-02-09 RX ADMIN — Medication 2.5 MILLIGRAM(S): at 05:34

## 2018-02-09 RX ADMIN — HEPARIN SODIUM 5000 UNIT(S): 5000 INJECTION INTRAVENOUS; SUBCUTANEOUS at 17:16

## 2018-02-09 RX ADMIN — DEXTROSE MONOHYDRATE, SODIUM CHLORIDE, AND POTASSIUM CHLORIDE 50 MILLILITER(S): 50; .745; 4.5 INJECTION, SOLUTION INTRAVENOUS at 10:39

## 2018-02-09 RX ADMIN — CEFTRIAXONE 100 GRAM(S): 500 INJECTION, POWDER, FOR SOLUTION INTRAMUSCULAR; INTRAVENOUS at 17:26

## 2018-02-09 RX ADMIN — Medication 5 MILLIGRAM(S): at 12:08

## 2018-02-09 NOTE — CHART NOTE - NSCHARTNOTEFT_GEN_A_CORE
Called by RN for chest pain, seen & examined at the bedside, awake, alert but confused, responds appropriately to simple commands, lying down flat supine without orthopnea, no JVD, Heart: variable S1, acc P2, no murmurs or extra sounds, Lungs: fair air entry B/L, B/L coarse basal rales, R>L, no rhonchi, vitals: HR 92/min, A. Fib, RR 18/min , /92, SPO2 95% on RA, Temp 98.0 F, Stat EKG showed A. Fib with RVR at 112/min, old anterior MI, normal QRS voltage, duration, and axis (+90), with delayed transition, non specific ST-T abnormality, same as compared with previous EKG done on Feb 6th, 2018. Patient is now pain free, stating that "ok now". Stat troponin was sent, endorsed to day team hospitalist to follow. Called by RN for chest pain, seen & examined at the bedside, awake, alert but confused, responds appropriately to simple commands, lying down flat supine without orthopnea, no JVD, Heart: variable S1, acc P2, no murmurs or extra sounds, Lungs: fair air entry B/L, B/L coarse basal rales, R>L, no rhonchi, vitals: HR 92/min, A. Fib, RR 18/min , /92, SPO2 95% on RA, Temp 98.0 F, Stat EKG showed A. Fib with RVR at 112/min, old anterior MI, normal QRS voltage, duration, and axis (+90), with delayed transition, non specific ST-T abnormality, same as compared with previous EKG done on Feb 6th, 2018. Patient is now pain free, stating that "ok now". Stat troponin was sent, cardiology already on the case, endorsed to day team hospitalist to follow.

## 2018-02-09 NOTE — PROGRESS NOTE ADULT - PROBLEM SELECTOR PLAN 2
will give IV lopressor 2.5 mg Q ^.No AC as high risk for fall, seizure and hematoma.
will give IV lopressor ^.No AC as high risk for fall, seizure and hematoma.
will give IV lopressor 2.5 mg Q ^.No AC as high risk for fall, seizure and hematoma.

## 2018-02-09 NOTE — DISCHARGE NOTE ADULT - HOSPITAL COURSE
86 y.o. F found by NH having seizure episode - no h/o seizures in the past - EMS gave versed 10mg IM in the ambulance with seizure halting about 2minutes later, had already been at least 20minutes of seizure episode by then, in ED found to be febrile, information limited, pt has AMS, spoke with daughter  - MOLST states DNR/DNI/no hospitalizations/limited interventions - at this time daughter wants everything done, but not sure about invasive studies, discussed LP, will start with labs/ct head/give 1st dose abx and rediscuss with daughter.  In ED pt was found to have Right sided weakness and she was not able to speak..  NIHSS couldn't be accessed accurately.  Pt is not a tPa candidate as her symptoms onset time is NOT known.  Daughter is at bedside.  In ED pt has elevated serum Na. Volume depletion and also signs of UTI.  Pt's mental status is improving, now responding to some  verbal command.  will get speech and swallow revaluation as pt's mental status has improved.  Pt's daughter might consider for PEG if she fails swallow study. 86 y.o. F found by NH having seizure episode - no h/o seizures in the past - EMS gave versed 10mg IM in the ambulance with seizure halting about 2minutes later, had already been at least 20minutes of seizure episode by then, in ED found to be febrile, information limited, pt has AMS, spoke with daughter  - MOLST states DNR/DNI/no hospitalizations/limited interventions - at this time daughter wants everything done, but not sure about invasive studies, discussed LP, will start with labs/ct head/give 1st dose abx and rediscuss with daughter.  In ED pt was found to have Right sided weakness and she was not able to speak..  NIHSS couldn't be accessed accurately.  Pt is not a tPa candidate as her symptoms onset time is NOT known.  Daughter is at bedside.  In ED pt has elevated serum Na. Volume depletion and also signs of UTI.  Pt's mental status improved.  Multiple swallow evaluations - failed.  Daughter requested PEG placement - done 2/14.  ALL MEDS AND FEED VIA PEG.  NPO status.  Seen and cleared by consultants for outpatient follow up.  A.fib with RVR - started digoxin and titrated metoprolol to better control HR.  Plan of care d/w daughter on a daily basis.

## 2018-02-09 NOTE — DISCHARGE NOTE ADULT - MEDICATION SUMMARY - MEDICATIONS TO TAKE
I will START or STAY ON the medications listed below when I get home from the hospital:    aspirin 325 mg oral tablet  -- 1 tab(s) by mouth once a day  -- Indication: For Stroke prevention    acetaminophen 325 mg oral tablet  -- 2 tab(s) by mouth every 6 hours, As needed, Moderate Pain (4 - 6)  -- Indication: For pain    lisinopril 5 mg oral tablet  -- 1 tab(s) by mouth once a day  -- Indication: For HTN    digoxin 125 mcg (0.125 mg) oral tablet  -- 1 tab(s) by mouth once a day  -- Indication: For A.fib with RVR    risperiDONE 1 mg oral tablet  -- 0.5 tab(s) by mouth 2 times a day  -- Indication: For Dementia    metoprolol tartrate 25 mg oral tablet  -- 1 tab(s) by mouth 3 times a day  -- Indication: For Atrial fibrillation    furosemide 20 mg oral tablet  -- 1 tab(s) by mouth 2 times a week  -- Indication: For Diastolic CHF    famotidine 20 mg oral tablet  -- 1 tab(s) by mouth once a day  -- Indication: For GERD    docusate sodium 100 mg oral capsule  -- 1 cap(s) by mouth once a day  -- Indication: For Constipation    senna oral tablet  -- 2 tab(s) by mouth once a day (at bedtime)  -- Indication: For Constipation    Oyst-Joe-D 500 mg-200 intl units oral tablet  -- 1 tab(s) by mouth once a day  -- Indication: For Supplement

## 2018-02-09 NOTE — PROGRESS NOTE ADULT - PROBLEM SELECTOR PLAN 7
mild chronic global hypokinesis , no clinical CHF , continue IV lopressor for now ,     will add oral BB ,ACE once her mental status improves     Patient is DNR DNI , supportive car

## 2018-02-09 NOTE — DISCHARGE NOTE ADULT - PLAN OF CARE
keep comfortable and cooperative risperdal completed antibiotic treatment meds as per cardiology avoid nephrotoxics. monitor intake and output. pepcid aspirin for CVA prevention

## 2018-02-09 NOTE — PROGRESS NOTE ADULT - ASSESSMENT
86 y.o. F found by NH having seizure episode - no h/o seizures in the past - EMS gave versed 10mg IM in the ambulance with seizure halting about 2minutes later, had already been at least 20minutes of seizure episode by then, in ED found to be febrile, information limited, pt has AMS, spoke with daughter  - MOLST states DNR/DNI/no hospitalizations/limited interventions - at this time daughter wants everything done, but not sure about invasive studies, discussed LP, will start with labs/ct head/give 1st dose abx and rediscuss with daughter.  In ED pt was found to have Right sided weakness and she was not able to speak..  NIHSS couldn't be accessed accurately.  Pt is not a tPa candidate as her symptoms onset time is NOT known.  Daughter is at bedside.  In ED pt has elevated serum Na. Volume depletion and also signs of UTI.  Pt's mental status is improving, now responding to some  verbal command.  will get speech and swallow revaluation as pt's mental status has improved.  Pt's daughter might consider for PEG if she fails swallow study. 86 y.o. F found by NH having seizure episode - no h/o seizures in the past - EMS gave versed 10mg IM in the ambulance with seizure halting about 2minutes later, had already been at least 20minutes of seizure episode by then, in ED found to be febrile, information limited, pt has AMS, spoke with daughter  - MOLST states DNR/DNI/no hospitalizations/limited interventions - at this time daughter wants everything done, but not sure about invasive studies, discussed LP, will start with labs/ct head/give 1st dose abx and rediscuss with daughter.  In ED pt was found to have Right sided weakness and she was not able to speak..  NIHSS couldn't be accessed accurately.  Pt is not a tPa candidate as her symptoms onset time is NOT known.  In ED pt has elevated serum Na. Volume depletion and also signs of UTI.  Pt's mental status is improving, now responding to some  verbal command.  will get speech and swallow revaluation as pt's mental status has improved.  Pt's daughter might consider for PEG if she fails swallow study.

## 2018-02-09 NOTE — DISCHARGE NOTE ADULT - CARE PROVIDER_API CALL
Guzman Rogers (MD), Medicine  [address] [place] [address] [address]  [place] [address]  Phone: [phone]  Fax: [phone]

## 2018-02-09 NOTE — PROGRESS NOTE ADULT - ASSESSMENT
Assessment and Recommendation:       ·  Chronic atrial fibrillation.  Uncontrolled ventricular response; Metoprolol 5mg IV x 1 now and then increase standing dose (patient remains unable to take PO meds); patient has not been chronically anticoagulated due to chronic subdural hematoma.l       ·  Cardiomyopathy.  Mild/borderline LV systolic function; continue metoprolol; add ACE-I when able to take oral meds    · Hypertension: Uncontrolled; metoprolol dose increased; observe and titrate antihypertensive regimen as appropriate.    ** I discussed care with Dr. Trevino.

## 2018-02-09 NOTE — DISCHARGE NOTE ADULT - MEDICATION SUMMARY - MEDICATIONS TO STOP TAKING
I will STOP taking the medications listed below when I get home from the hospital:    ferrous sulfate 325 mg oral tablet  -- 1 tab(s) by mouth once a day  -- Check with your doctor before becoming pregnant.  Do not chew, break, or crush.  May discolor urine or feces.    cefTRIAXone 1 g injection  --  injectable    ferrous sulfate  --  by mouth    famotidine 10 mg oral tablet  -- 1 tab(s) by mouth 2 times a day    lisinopril 5 mg oral tablet  -- 1 tab(s) by mouth once a day    aspirin 325 mg oral tablet  -- 1 tab(s) by mouth once a day

## 2018-02-09 NOTE — DISCHARGE NOTE ADULT - ADDITIONAL INSTRUCTIONS
ALL MEDS AND FEED VIA PEG.  NPO.  Suggest ongoing cardiology f/up at [facility] for atrial fibrillation management.

## 2018-02-09 NOTE — DISCHARGE NOTE ADULT - MEDICATION SUMMARY - MEDICATIONS TO CHANGE
I will SWITCH the dose or number of times a day I take the medications listed below when I get home from the hospital:    Aspirin Enteric Coated 325 mg oral delayed release tablet  -- 1 tab(s) by mouth once a day    furosemide 40 mg oral tablet  -- 1 tab(s) by mouth once a day

## 2018-02-09 NOTE — PROGRESS NOTE ADULT - PROBLEM SELECTOR PLAN 5
likely due to infection, or CVA.     iv hydration.
likely due to infection, or CVA.     iv hydration.
likely due to infection, or CVA.  f/u urin c/s, blood c/s.   iv hydration.

## 2018-02-09 NOTE — DISCHARGE NOTE ADULT - CARE PLAN
Principal Discharge DX:	Dementia with behavioral disturbance  Goal:	keep comfortable and cooperative  Assessment and plan of treatment:	risperdal  Secondary Diagnosis:	Acute cystitis without hematuria  Assessment and plan of treatment:	completed antibiotic treatment  Secondary Diagnosis:	Chronic atrial fibrillation  Assessment and plan of treatment:	meds as per cardiology  Secondary Diagnosis:	Chronic kidney disease  Assessment and plan of treatment:	avoid nephrotoxics. monitor intake and output.  Secondary Diagnosis:	GERD (gastroesophageal reflux disease)  Assessment and plan of treatment:	pepcid  Secondary Diagnosis:	CVA, old, cognitive deficits  Assessment and plan of treatment:	aspirin for CVA prevention

## 2018-02-09 NOTE — DISCHARGE NOTE ADULT - SECONDARY DIAGNOSIS.
Acute cystitis without hematuria Chronic atrial fibrillation Chronic kidney disease GERD (gastroesophageal reflux disease) CVA, old, cognitive deficits

## 2018-02-09 NOTE — DISCHARGE NOTE ADULT - PATIENT PORTAL LINK FT
You can access the [facility], offered by [facility], by registering with the following website: [email or web address]

## 2018-02-09 NOTE — PROGRESS NOTE ADULT - ASSESSMENT
Seen for seizure/AMS/Right sided weakness/Global aphasia - Much improved.  CT Head - No acute pathology. Old Infarcts.  CVA in Left MCA area was suspected clinically, but reapeat CT Head is negative.  Did pt have David'd paralysis?  Limited CVA w/up is done with agreement of daughter.  Pt does have lacunar Infarct on left Internal capsule which is reported as old.   Carotid doppler/2D Echo will NOT be done.   Madeleine further seizure  - would begin keppra.  Pt is DNR/DNI.   MT  SLP f/up - If pt is able to take medication by mouth - ASA will be reduced to 81 mg PO.  Ecoli UTI On antibiotics.  Would continue to follow.  D/w Dr. Trevino.

## 2018-02-10 LAB
ANION GAP SERPL CALC-SCNC: 9 MMOL/L — SIGNIFICANT CHANGE UP (ref 5–17)
BUN SERPL-MCNC: 14 MG/DL — SIGNIFICANT CHANGE UP (ref 7–23)
CALCIUM SERPL-MCNC: 8.2 MG/DL — LOW (ref 8.4–10.5)
CHLORIDE SERPL-SCNC: 110 MMOL/L — HIGH (ref 96–108)
CO2 SERPL-SCNC: 23 MMOL/L — SIGNIFICANT CHANGE UP (ref 22–31)
CREAT SERPL-MCNC: 1.15 MG/DL — SIGNIFICANT CHANGE UP (ref 0.5–1.3)
GLUCOSE SERPL-MCNC: 109 MG/DL — HIGH (ref 70–99)
HCT VFR BLD CALC: 33.1 % — LOW (ref 34.5–45)
HGB BLD-MCNC: 11 G/DL — LOW (ref 11.5–15.5)
MCHC RBC-ENTMCNC: 30.5 PG — SIGNIFICANT CHANGE UP (ref 27–34)
MCHC RBC-ENTMCNC: 33.1 GM/DL — SIGNIFICANT CHANGE UP (ref 32–36)
MCV RBC AUTO: 92.1 FL — SIGNIFICANT CHANGE UP (ref 80–100)
PLATELET # BLD AUTO: 136 K/UL — LOW (ref 150–400)
POTASSIUM SERPL-MCNC: 3.7 MMOL/L — SIGNIFICANT CHANGE UP (ref 3.5–5.3)
POTASSIUM SERPL-SCNC: 3.7 MMOL/L — SIGNIFICANT CHANGE UP (ref 3.5–5.3)
RBC # BLD: 3.59 M/UL — LOW (ref 3.8–5.2)
RBC # FLD: 12.8 % — SIGNIFICANT CHANGE UP (ref 10.3–14.5)
SODIUM SERPL-SCNC: 142 MMOL/L — SIGNIFICANT CHANGE UP (ref 135–145)
WBC # BLD: 5 K/UL — SIGNIFICANT CHANGE UP (ref 3.8–10.5)
WBC # FLD AUTO: 5 K/UL — SIGNIFICANT CHANGE UP (ref 3.8–10.5)

## 2018-02-10 PROCEDURE — 99233 SBSQ HOSP IP/OBS HIGH 50: CPT

## 2018-02-10 RX ORDER — HALOPERIDOL DECANOATE 100 MG/ML
0.5 INJECTION INTRAMUSCULAR EVERY 8 HOURS
Qty: 0 | Refills: 0 | Status: DISCONTINUED | OUTPATIENT
Start: 2018-02-10 | End: 2018-02-11

## 2018-02-10 RX ADMIN — Medication 300 MILLIGRAM(S): at 12:00

## 2018-02-10 RX ADMIN — HEPARIN SODIUM 5000 UNIT(S): 5000 INJECTION INTRAVENOUS; SUBCUTANEOUS at 06:05

## 2018-02-10 RX ADMIN — Medication 5 MILLIGRAM(S): at 00:01

## 2018-02-10 RX ADMIN — Medication 0.25 MILLIGRAM(S): at 20:23

## 2018-02-10 RX ADMIN — CEFTRIAXONE 100 GRAM(S): 500 INJECTION, POWDER, FOR SOLUTION INTRAMUSCULAR; INTRAVENOUS at 17:15

## 2018-02-10 RX ADMIN — Medication 5 MILLIGRAM(S): at 06:05

## 2018-02-10 RX ADMIN — DEXTROSE MONOHYDRATE, SODIUM CHLORIDE, AND POTASSIUM CHLORIDE 50 MILLILITER(S): 50; .745; 4.5 INJECTION, SOLUTION INTRAVENOUS at 12:21

## 2018-02-10 RX ADMIN — Medication 0.25 MILLIGRAM(S): at 16:43

## 2018-02-10 RX ADMIN — DEXTROSE MONOHYDRATE, SODIUM CHLORIDE, AND POTASSIUM CHLORIDE 50 MILLILITER(S): 50; .745; 4.5 INJECTION, SOLUTION INTRAVENOUS at 17:20

## 2018-02-10 RX ADMIN — HALOPERIDOL DECANOATE 0.5 MILLIGRAM(S): 100 INJECTION INTRAMUSCULAR at 14:37

## 2018-02-10 RX ADMIN — HEPARIN SODIUM 5000 UNIT(S): 5000 INJECTION INTRAVENOUS; SUBCUTANEOUS at 17:15

## 2018-02-10 RX ADMIN — Medication 5 MILLIGRAM(S): at 17:15

## 2018-02-10 RX ADMIN — Medication 5 MILLIGRAM(S): at 00:10

## 2018-02-10 RX ADMIN — Medication 5 MILLIGRAM(S): at 12:21

## 2018-02-10 NOTE — CHART NOTE - NSCHARTNOTEFT_GEN_A_CORE
Assessment:     Factors impacting intake: [ ] none [ ] nausea  [ ] vomiting [ ] diarrhea [ ] constipation  [ ]chewing problems [x ] swallowing issues  [ ] other:     Diet Presciption: Diet, NPO (02-06-18 @ 09:50)    Intake: N/A     Current Weight: Weight (kg): 40.8 (02-06 @ 07:20)  % Weight Change    Pertinent Medications: MEDICATIONS  (STANDING):  aspirin Suppository 300 milliGRAM(s) Rectal daily  cefTRIAXone   IVPB 1 Gram(s) IV Intermittent every 24 hours  dextrose 5% + sodium chloride 0.45% with potassium chloride 40 mEq/L 1000 milliLiter(s) (50 mL/Hr) IV Continuous <Continuous>  heparin  Injectable 5000 Unit(s) SubCutaneous every 12 hours  metoprolol    tartrate Injectable 5 milliGRAM(s) IV Push every 6 hours    MEDICATIONS  (PRN):  acetaminophen  Suppository 650 milliGRAM(s) Rectal every 6 hours PRN For Temp greater than 38 C (100.4 F)  haloperidol    Injectable 0.5 milliGRAM(s) IntraMuscular every 8 hours PRN Agitation    Pertinent Labs: 02-10 Na142 mmol/L Glu 109 mg/dL<H> K+ 3.7 mmol/L Cr  1.15 mg/dL BUN 14 mg/dL Phos n/a   Alb n/a   PAB n/a        CAPILLARY BLOOD GLUCOSE      POCT Blood Glucose.: 112 mg/dL (09 Feb 2018 21:02)    Skin:     Estimated Needs:   [ x] no change since previous assessment  [ ] recalculated:     Previous Nutrition Diagnosis:   [ ] Inadequate Energy Intake [ ]Inadequate Oral Intake [ ] Excessive Energy Intake   [ ] Underweight [ ] Increased Nutrient Needs [ ] Overweight/Obesity   [ ] Altered GI Function [ ] Unintended Weight Loss [ ] Food & Nutrition Related Knowledge Deficit [ ] Malnutrition     Nutrition Diagnosis is [ ] ongoing  [ ] resolved [ ] not applicable     New Nutrition Diagnosis: [ ] not applicable       Interventions:   Recommend  [ ] Change Diet To:  [ ] Nutrition Supplement  [ ] Nutrition Support  [ ] Other:     Monitoring and Evaluation:   [ ] PO intake [ x ] Tolerance to diet prescription [ x ] weights [ x ] labs[ x ] follow up per protocol  [ ] other: Assessment:  Pt failed swallow test more than once. Per nsg staff, awaiting family's decision re enteral nutrition. Per MD notes, prognosis poor. RD remains available if tube feeding requested.    Factors impacting intake: [ ] none [ ] nausea  [ ] vomiting [ ] diarrhea [ ] constipation  [ ]chewing problems [x ] swallowing issues  [ ] other:     Diet Presciption: Diet, NPO (02-06-18 @ 09:50)    Intake: N/A     Current Weight: Weight (kg): 40.8 (02-06 @ 07:20) 2/10 83#  % Weight Change 7.8%    Pertinent Medications: MEDICATIONS  (STANDING):  aspirin Suppository 300 milliGRAM(s) Rectal daily  cefTRIAXone   IVPB 1 Gram(s) IV Intermittent every 24 hours  dextrose 5% + sodium chloride 0.45% with potassium chloride 40 mEq/L 1000 milliLiter(s) (50 mL/Hr) IV Continuous <Continuous>  heparin  Injectable 5000 Unit(s) SubCutaneous every 12 hours  metoprolol    tartrate Injectable 5 milliGRAM(s) IV Push every 6 hours    MEDICATIONS  (PRN):  acetaminophen  Suppository 650 milliGRAM(s) Rectal every 6 hours PRN For Temp greater than 38 C (100.4 F)  haloperidol    Injectable 0.5 milliGRAM(s) IntraMuscular every 8 hours PRN Agitation    Pertinent Labs: 02-10 Na142 mmol/L Glu 109 mg/dL<H> K+ 3.7 mmol/L Cr  1.15 mg/dL BUN 14 mg/dL Phos n/a   Alb n/a   PAB n/a        CAPILLARY BLOOD GLUCOSE      POCT Blood Glucose.: 112 mg/dL (09 Feb 2018 21:02)    Skin: WNL    Estimated Needs:   [ x] no change since previous assessment  [ ] recalculated:     Previous Nutrition Diagnosis:   [ ] Inadequate Energy Intake [ ]Inadequate Oral Intake [ ] Excessive Energy Intake   [ ] Underweight [ ] Increased Nutrient Needs [ ] Overweight/Obesity [x] swallowing difficulty  [ ] Altered GI Function [ ] Unintended Weight Loss [ ] Food & Nutrition Related Knowledge Deficit [ ] Malnutrition     Nutrition Diagnosis is [x] ongoing  [ ] resolved [ ] not applicable     New Nutrition Diagnosis: [ ] not applicable       Interventions:   Recommend  [ ] Change Diet To:  [ ] Nutrition Supplement  [ ] Nutrition Support  [ ] Other:     Monitoring and Evaluation:   [ ] PO intake [ x ] Tolerance to diet prescription [ x ] weights [ x ] labs[ x ] follow up per protocol  [ ] other:

## 2018-02-10 NOTE — PROGRESS NOTE ADULT - ASSESSMENT
Seen for seizure/AMS/Right sided weakness/Global aphasia - Much improved.  CT Head - No acute pathology. Old Infarcts.  CVA in Left MCA area was suspected clinically, but reapeat CT Head is negative.  Did pt have David'd paralysis?  Limited CVA w/up is done with agreement of daughter.  Pt does have lacunar Infarct on left Internal capsule which is reported as old.   Carotid doppler/2D Echo will NOT be done.   Madeleine further seizure  - would begin keppra.  Pt is DNR/DNI.   [place]  SLP f/up.  If pt is able to take medication by mouth - ASA will be reduced to 81 mg PO.  Ecoli UTI On antibiotics.  Agree with Haldol PRN for agitation.  Fall/safety precautions.  Would continue to follow.

## 2018-02-10 NOTE — PROGRESS NOTE ADULT - ASSESSMENT
86 female with    1. old strokes, dementia with behavioral disturbance: f/up neuro recs. failed speech and swallow. await family decision on PEG. rectal aspirin    2. chronic a.fib: continue cardiac meds. patient asymptomatic. no AC candidate    3. UTI: day 3/3 Rocephin    4. GOC: DNR DNI. await family decision on PEG.     5. CKD III: avoid nephrotoxics. monitor intake and output.     6. heparin SQ for dvt ppx    7. poor overall prognosis.  will d/w daughter plan of care.

## 2018-02-11 LAB
CULTURE RESULTS: SIGNIFICANT CHANGE UP
CULTURE RESULTS: SIGNIFICANT CHANGE UP
GLUCOSE BLDC GLUCOMTR-MCNC: 123 MG/DL — HIGH (ref 70–99)
SPECIMEN SOURCE: SIGNIFICANT CHANGE UP
SPECIMEN SOURCE: SIGNIFICANT CHANGE UP

## 2018-02-11 PROCEDURE — 99233 SBSQ HOSP IP/OBS HIGH 50: CPT

## 2018-02-11 PROCEDURE — 12345: CPT | Mod: NC

## 2018-02-11 RX ORDER — DEXTROSE 50 % IN WATER 50 %
50 SYRINGE (ML) INTRAVENOUS ONCE
Qty: 0 | Refills: 0 | Status: DISCONTINUED | OUTPATIENT
Start: 2018-02-11 | End: 2018-02-11

## 2018-02-11 RX ORDER — DIGOXIN 250 MCG
0.25 TABLET ORAL ONCE
Qty: 0 | Refills: 0 | Status: COMPLETED | OUTPATIENT
Start: 2018-02-11 | End: 2018-02-11

## 2018-02-11 RX ORDER — SODIUM CHLORIDE 9 MG/ML
1000 INJECTION, SOLUTION INTRAVENOUS
Qty: 0 | Refills: 0 | Status: DISCONTINUED | OUTPATIENT
Start: 2018-02-11 | End: 2018-02-11

## 2018-02-11 RX ORDER — DILTIAZEM HCL 120 MG
5 CAPSULE, EXT RELEASE 24 HR ORAL
Qty: 125 | Refills: 0 | Status: DISCONTINUED | OUTPATIENT
Start: 2018-02-11 | End: 2018-02-11

## 2018-02-11 RX ORDER — METOPROLOL TARTRATE 50 MG
5 TABLET ORAL EVERY 4 HOURS
Qty: 0 | Refills: 0 | Status: DISCONTINUED | OUTPATIENT
Start: 2018-02-11 | End: 2018-02-14

## 2018-02-11 RX ORDER — DIGOXIN 250 MCG
0.12 TABLET ORAL DAILY
Qty: 0 | Refills: 0 | Status: DISCONTINUED | OUTPATIENT
Start: 2018-02-12 | End: 2018-02-13

## 2018-02-11 RX ADMIN — HEPARIN SODIUM 5000 UNIT(S): 5000 INJECTION INTRAVENOUS; SUBCUTANEOUS at 05:28

## 2018-02-11 RX ADMIN — Medication 5 MILLIGRAM(S): at 15:21

## 2018-02-11 RX ADMIN — Medication 300 MILLIGRAM(S): at 11:36

## 2018-02-11 RX ADMIN — Medication 0.25 MILLIGRAM(S): at 11:39

## 2018-02-11 RX ADMIN — HALOPERIDOL DECANOATE 0.5 MILLIGRAM(S): 100 INJECTION INTRAMUSCULAR at 07:42

## 2018-02-11 RX ADMIN — Medication 5 MG/HR: at 10:02

## 2018-02-11 RX ADMIN — Medication 5 MILLIGRAM(S): at 21:27

## 2018-02-11 RX ADMIN — HEPARIN SODIUM 5000 UNIT(S): 5000 INJECTION INTRAVENOUS; SUBCUTANEOUS at 19:02

## 2018-02-11 RX ADMIN — Medication 0.25 MILLIGRAM(S): at 14:43

## 2018-02-11 RX ADMIN — Medication 5 MILLIGRAM(S): at 19:02

## 2018-02-11 RX ADMIN — DEXTROSE MONOHYDRATE, SODIUM CHLORIDE, AND POTASSIUM CHLORIDE 50 MILLILITER(S): 50; .745; 4.5 INJECTION, SOLUTION INTRAVENOUS at 05:29

## 2018-02-11 RX ADMIN — Medication 5 MILLIGRAM(S): at 11:54

## 2018-02-11 RX ADMIN — Medication 5 MILLIGRAM(S): at 05:29

## 2018-02-11 NOTE — PROGRESS NOTE ADULT - ASSESSMENT
86 female with    1. old strokes, dementia with behavioral disturbance: appreciate neuro recs. speech and swallow reeval tomorrow. daughter wants PEG.  NGT will likely make patient more agitated. and she would constantly pull at it.    2. chronic a.fib: continue cardiac meds. not AC candidate. d/w Dr. Crooks - recommend digoxin.    3. UTI: completed abx course.    4. GOC: DNR DNI. daughter wants PEG.  explained ciro-procedure risks    5. CKD III: avoid nephrotoxics. monitor intake and output.     6. heparin SQ for dvt ppx    7. poor overall prognosis.  will d/w daughter plan of care.  may be hospice appropriate if any further worsening.

## 2018-02-11 NOTE — CHART NOTE - NSCHARTNOTEFT_GEN_A_CORE
RN called pt has afib c -180  examined pt at bedside, comfortable, resting, denies any cp, palptiation, sob, abdominal changes  given cardizem 5 mg ivp one time  broke the rate to  afib, after one hour, again went to afib c rvr  discussed with cardio on call and primary attending will upgrade to SPCU and start on cardizem drip   spend time 20 min

## 2018-02-11 NOTE — PROGRESS NOTE ADULT - ASSESSMENT
Seen for seizure/AMS/Right sided weakness/Global aphasia - Much improved.  CT Head - No acute pathology. Old Infarcts.  CVA in Left MCA area was suspected clinically, but reapeat CT Head is negative.  Did pt have David'd paralysis?  Limited CVA w/up is done with agreement of daughter.  Pt does have lacunar Infarct on left Internal capsule which is reported as old.   Carotid doppler/2D Echo will NOT be done.   Any further seizure  - would begin Keppra.  Pt is DNR/DNI.   [place]  SLP f/up - If pt is able to take medication by mouth - ASA will be reduced to 81 mg PO.  Ecoli UTI On antibiotics.  Family wants PEG.  Would continue to follow.  D/w Dr. Trevino. Seen for seizure/AMS/Right sided weakness/Global aphasia - Much improved.  CT Head - No acute pathology. Old Infarcts.  CVA in Left MCA area was suspected clinically, but reapeat CT Head is negative.  Did pt have David'd paralysis?  Limited CVA w/up is done with agreement of daughter.  Pt does have lacunar Infarct on left Internal capsule which is reported as old.   Carotid doppler/2D Echo will NOT be done.   Any further seizure  - would begin Keppra.  Pt is DNR/DNI.   [place]  SLP f/up - If pt is able to take medication by mouth - ASA will be reduced to 81 mg PO.  Ecoli UTI On antibiotics.  Family wants PEG.  Would continue to follow.  D/w Dr. Peacock

## 2018-02-11 NOTE — CONSULT NOTE ADULT - ASSESSMENT
86 f w/ above pmh-  seizures-c/b dysphagia.   swallow f/u monday  will d/w family re peg RBIA  Hold ASA in anticipation for peg this week  if unable to swallow place Ngt for feedings
Seen for AMS/Right sided weakness/Global aphasia.    CT Head - No acute pathology. Old Infarcts.  CVA in Left MCA area clinically.  Admit to monitored bed.  NPO   [place]  Pt is DNR/DNI.  Limited CVA w/up will be done with agreement of daughter at bedside.  Would do repeat CT head in 48 hrs.  Carotid doppler/2D Echo will NOT be done.  Lipid panel/HbA1c.  Dysphagia eval in ED.  PT Eval/Speech and swallowing eval.  PT Eval.  D/w daughter at bedside. Questions answered.  D/w Dr. Trevino.  Would continue to follow.

## 2018-02-11 NOTE — PROGRESS NOTE ADULT - ASSESSMENT
Assessment and Recommendation:       ·  Chronic atrial fibrillation.  Better control but still not adequate while unable to take PO meds; Will add low dose IV digoxin and monitor her response.       ·  Cardiomyopathy.  Mild/borderline LV systolic function; continue metoprolol; add ACE-I when able to take oral meds    · Hypertension: Still high.  Will observe and titrate antihypertensive further when able to take PO meds..

## 2018-02-12 DIAGNOSIS — R13.10 DYSPHAGIA, UNSPECIFIED: ICD-10-CM

## 2018-02-12 LAB
ALBUMIN SERPL ELPH-MCNC: 2.8 G/DL — LOW (ref 3.3–5)
ALP SERPL-CCNC: 70 U/L — SIGNIFICANT CHANGE UP (ref 30–120)
ALT FLD-CCNC: 16 U/L DA — SIGNIFICANT CHANGE UP (ref 10–60)
ANION GAP SERPL CALC-SCNC: 12 MMOL/L — SIGNIFICANT CHANGE UP (ref 5–17)
APTT BLD: 36.9 SEC — SIGNIFICANT CHANGE UP (ref 27.5–37.4)
AST SERPL-CCNC: 18 U/L — SIGNIFICANT CHANGE UP (ref 10–40)
BASOPHILS # BLD AUTO: 0.1 K/UL — SIGNIFICANT CHANGE UP (ref 0–0.2)
BASOPHILS NFR BLD AUTO: 1.9 % — SIGNIFICANT CHANGE UP (ref 0–2)
BILIRUB SERPL-MCNC: 0.6 MG/DL — SIGNIFICANT CHANGE UP (ref 0.2–1.2)
BLD GP AB SCN SERPL QL: SIGNIFICANT CHANGE UP
BUN SERPL-MCNC: 12 MG/DL — SIGNIFICANT CHANGE UP (ref 7–23)
CALCIUM SERPL-MCNC: 9 MG/DL — SIGNIFICANT CHANGE UP (ref 8.4–10.5)
CHLORIDE SERPL-SCNC: 105 MMOL/L — SIGNIFICANT CHANGE UP (ref 96–108)
CO2 SERPL-SCNC: 24 MMOL/L — SIGNIFICANT CHANGE UP (ref 22–31)
CREAT SERPL-MCNC: 0.95 MG/DL — SIGNIFICANT CHANGE UP (ref 0.5–1.3)
EOSINOPHIL # BLD AUTO: 0.1 K/UL — SIGNIFICANT CHANGE UP (ref 0–0.5)
EOSINOPHIL NFR BLD AUTO: 1.3 % — SIGNIFICANT CHANGE UP (ref 0–6)
GLUCOSE SERPL-MCNC: 112 MG/DL — HIGH (ref 70–99)
HCT VFR BLD CALC: 35.3 % — SIGNIFICANT CHANGE UP (ref 34.5–45)
HGB BLD-MCNC: 11.8 G/DL — SIGNIFICANT CHANGE UP (ref 11.5–15.5)
INR BLD: 1.19 RATIO — HIGH (ref 0.88–1.16)
LYMPHOCYTES # BLD AUTO: 0.9 K/UL — LOW (ref 1–3.3)
LYMPHOCYTES # BLD AUTO: 19.1 % — SIGNIFICANT CHANGE UP (ref 13–44)
MCHC RBC-ENTMCNC: 30.6 PG — SIGNIFICANT CHANGE UP (ref 27–34)
MCHC RBC-ENTMCNC: 33.3 GM/DL — SIGNIFICANT CHANGE UP (ref 32–36)
MCV RBC AUTO: 92 FL — SIGNIFICANT CHANGE UP (ref 80–100)
MONOCYTES # BLD AUTO: 0.4 K/UL — SIGNIFICANT CHANGE UP (ref 0–0.9)
MONOCYTES NFR BLD AUTO: 8.3 % — SIGNIFICANT CHANGE UP (ref 2–14)
NEUTROPHILS # BLD AUTO: 3.4 K/UL — SIGNIFICANT CHANGE UP (ref 1.8–7.4)
NEUTROPHILS NFR BLD AUTO: 69.4 % — SIGNIFICANT CHANGE UP (ref 43–77)
NT-PROBNP SERPL-SCNC: HIGH PG/ML (ref 0–450)
PLATELET # BLD AUTO: 150 K/UL — SIGNIFICANT CHANGE UP (ref 150–400)
POTASSIUM SERPL-MCNC: 4.1 MMOL/L — SIGNIFICANT CHANGE UP (ref 3.5–5.3)
POTASSIUM SERPL-SCNC: 4.1 MMOL/L — SIGNIFICANT CHANGE UP (ref 3.5–5.3)
PROCALCITONIN SERPL-MCNC: 0.21 NG/ML — HIGH (ref 0–0.04)
PROT SERPL-MCNC: 7.1 G/DL — SIGNIFICANT CHANGE UP (ref 6–8.3)
PROTHROM AB SERPL-ACNC: 13 SEC — HIGH (ref 9.8–12.7)
RBC # BLD: 3.84 M/UL — SIGNIFICANT CHANGE UP (ref 3.8–5.2)
RBC # FLD: 12.5 % — SIGNIFICANT CHANGE UP (ref 10.3–14.5)
SODIUM SERPL-SCNC: 141 MMOL/L — SIGNIFICANT CHANGE UP (ref 135–145)
WBC # BLD: 4.9 K/UL — SIGNIFICANT CHANGE UP (ref 3.8–10.5)
WBC # FLD AUTO: 4.9 K/UL — SIGNIFICANT CHANGE UP (ref 3.8–10.5)

## 2018-02-12 PROCEDURE — 74018 RADEX ABDOMEN 1 VIEW: CPT | Mod: 26,59

## 2018-02-12 PROCEDURE — 12345: CPT | Mod: NC

## 2018-02-12 PROCEDURE — 99233 SBSQ HOSP IP/OBS HIGH 50: CPT

## 2018-02-12 PROCEDURE — 71045 X-RAY EXAM CHEST 1 VIEW: CPT | Mod: 26

## 2018-02-12 RX ORDER — FUROSEMIDE 40 MG
20 TABLET ORAL ONCE
Qty: 0 | Refills: 0 | Status: COMPLETED | OUTPATIENT
Start: 2018-02-12 | End: 2018-02-12

## 2018-02-12 RX ORDER — HYDRALAZINE HCL 50 MG
5 TABLET ORAL EVERY 6 HOURS
Qty: 0 | Refills: 0 | Status: DISCONTINUED | OUTPATIENT
Start: 2018-02-12 | End: 2018-02-15

## 2018-02-12 RX ADMIN — Medication 5 MILLIGRAM(S): at 10:55

## 2018-02-12 RX ADMIN — Medication 0.5 MILLIGRAM(S): at 04:38

## 2018-02-12 RX ADMIN — Medication 5 MILLIGRAM(S): at 06:08

## 2018-02-12 RX ADMIN — Medication 5 MILLIGRAM(S): at 14:50

## 2018-02-12 RX ADMIN — Medication 5 MILLIGRAM(S): at 17:07

## 2018-02-12 RX ADMIN — HEPARIN SODIUM 5000 UNIT(S): 5000 INJECTION INTRAVENOUS; SUBCUTANEOUS at 06:08

## 2018-02-12 RX ADMIN — Medication 20 MILLIGRAM(S): at 11:53

## 2018-02-12 RX ADMIN — Medication 5 MILLIGRAM(S): at 01:47

## 2018-02-12 RX ADMIN — Medication 5 MILLIGRAM(S): at 21:18

## 2018-02-12 RX ADMIN — Medication 1.25 MILLIGRAM(S): at 17:20

## 2018-02-12 RX ADMIN — HEPARIN SODIUM 5000 UNIT(S): 5000 INJECTION INTRAVENOUS; SUBCUTANEOUS at 17:07

## 2018-02-12 NOTE — CONSULT NOTE ADULT - PROBLEM SELECTOR RECOMMENDATION 9
frail and weakness  aspiration risk  HOB elev  oral hygiene  skin care  chest pt  suction PRN  asst with ADL  cxr noted and TTE noted and proBNP noted  pt may need LASIX prn  left a message for the daughter to discuss goals of care and issue of possible PEG  will follow and monitor vs and HD and Sat  overall prognosis POOR  pt is DNR DNI  pt is able to verbalize and follows simple commands  cont to optimize cvs regimen, BP control and volume status

## 2018-02-12 NOTE — CONSULT NOTE ADULT - SUBJECTIVE AND OBJECTIVE BOX
Chief Complaint:  Patient is a 86y old  Female who presents with a chief complaint of seizure. (09 Feb 2018 14:05)    RA (rheumatoid arthritis)  Constipation  Generalized anxiety disorder  Dry eye  Chronic pain  Chronic kidney disease  Iron deficiency anemia  GERD (gastroesophageal reflux disease)  Dementia  HTN (hypertension)  Atrial fibrillation  Heart failure  Hyperlipidemia  Osteoarthritis  Subdural hematoma  Atrial fibrillation  Age related osteoporosis  HTN (hypertension)  No significant past surgical history     HPI:  86 y.o. F found by NH having seizure episode - no h/o seizures in the past - EMS gave versed 10mg IM in the ambulance with seizure halting about 2minutes later, had already been at least 20minutes of seizure episode by then, in ED found to be febrile, information limited, pt has AMS, spoke with daughter  - [facility]OLST states DNR/DNI/no hospitalizations/limited interventions - at this time daughter wants everything done, but not sure about invasive studies, discussed LP, will start with labs/ct head/give 1st dose abx and rediscuss with daughter.  In ED pt was found to have Right sided weakness and she was not able to speak..  NIHSS couldn't be accessed accurately.  Pt is not a tPa candidate as her symptoms onset time is NOT known.  Daughter is at bedside.  In ED pt has elevated serum Na. Volume depletion and also signs of UTI. (06 Feb 2018 12:56)  asked to evaluate re dysphagia, need for peg.       No Known Allergies      acetaminophen  Suppository 650 milliGRAM(s) Rectal every 6 hours PRN  aspirin Suppository 300 milliGRAM(s) Rectal daily  dextrose 5% + sodium chloride 0.45% with potassium chloride 40 mEq/L 1000 milliLiter(s) IV Continuous <Continuous>  diltiazem Infusion 5 mG/Hr IV Continuous <Continuous>  haloperidol    Injectable 0.5 milliGRAM(s) IntraMuscular every 8 hours PRN  heparin  Injectable 5000 Unit(s) SubCutaneous every 12 hours  LORazepam   Injectable 0.25 milliGRAM(s) IntraMuscular two times a day PRN  metoprolol    tartrate Injectable 5 milliGRAM(s) IV Push every 6 hours        FAMILY HISTORY:  No pertinent family history in first degree relatives        Review of Systems:  unable to ascertain  Physical Exam:    Vital Signs:  Vital Signs Last 24 Hrs  T(C): 36.1 (11 Feb 2018 09:20), Max: 36.9 (11 Feb 2018 01:13)  T(F): 97 (11 Feb 2018 09:20), Max: 98.5 (11 Feb 2018 01:13)  HR: 72 (11 Feb 2018 12:09) (72 - 170)  BP: 151/87 (11 Feb 2018 12:09) (115/84 - 180/96)  BP(mean): --  RR: 18 (11 Feb 2018 09:20) (18 - 20)  SpO2: 97% (11 Feb 2018 09:20) (94% - 99%)  Daily     Daily     General:  Appears stated age, well-groomed, well-nourished, no distress  HEENT:  NC/AT,  conjunctivae clear and pink, no thyromegaly, nodules, adenopathy, no JVD  Chest:  Full & symmetric excursion, no increased effort, breath sounds clear  Cardiovascular:  Regular rhythm, S1, S2, no murmur/rub/S3/S4, no abdominal bruit, no edema  Abdomen:  Soft, non-tender, non-distended, normoactive bowel sounds,  no masses ,no hepatosplenomeagaly, no signs of chronic liver disease  Extremities:  no cyanosis,clubbing or edema  Skin:  No rash/erythema/ecchymoses/petechiae/wounds/abscess/warm/dry  Neuro/Psych:  Alert, oriented, no asterixis, no tremor, no encephalopathy    Laboratory:                            11.0   5.0   )-----------( 136      ( 10 Feb 2018 07:27 )             33.1     02-10    142  |  110<H>  |  14  ----------------------------<  109<H>  3.7   |  23  |  1.15    Ca    8.2<L>      10 Feb 2018 07:24              Imaging:      Assessment:      Plan:
CHIEF COMPLAINT:  CVA  AFIB     HPI: 87 yo F w/ NICM (EF 44%), moderate AR, medication-nonadherent based on hx and [employer] record, Afib (not on a/c 2/2 chronic subdural hematoma), MDS, CKD , Nursing home resident with complain that patient had seizures ,  patient received medication by EMS , her seizures resolved , Patient did have fever , 101.5 F , patient was noted to CVA on CT scan , patient has chronic atrial fibrillation Not AC  , patient does have episodes of afib with rapid rate on monitor due to episodes of agitation ,     Pertinent info:  [id number]: EF 44%, moderate AR, severe TR, severely dilated LA index =106.  PAST MEDICAL & SURGICAL HISTORY:  RA (rheumatoid arthritis)  Constipation  Generalized anxiety disorder  Dry eye  Chronic pain  Chronic kidney disease: stage 3 moderate  Iron deficiency anemia  GERD (gastroesophageal reflux disease)  Dementia  HTN (hypertension)  Atrial fibrillation  Heart failure  Hyperlipidemia  Osteoarthritis  Subdural hematoma  HTN (hypertension)  No significant past surgical history      Allergies    No Known Allergies    Intolerances        SOCIAL HISTORY: non smoker     FAMILY HISTORY:  No pertinent family history in first degree relatives      MEDICATIONS:  MEDICATIONS  (STANDING):  aspirin 325 milliGRAM(s) Oral daily  cefTRIAXone   IVPB 1 Gram(s) IV Intermittent every 24 hours  dextrose 5% + sodium chloride 0.45%. 1000 milliLiter(s) (50 mL/Hr) IV Continuous <Continuous>  heparin  Injectable 5000 Unit(s) SubCutaneous every 12 hours    MEDICATIONS  (PRN):      REVIEW OF SYSTEMS:   Unable to obtain as patient is non communicative       Vital Signs Last 24 Hrs  T(C): 36.7 (2018 12:03), Max: 38.4 (2018 04:35)  T(F): 98 (2018 12:03), Max: 101.1 (2018 04:35)  HR: 109 (2018 12:03) (78 - 109)  BP: 157/92 (2018 12:03) (123/73 - 167/97)  BP(mean): --  RR: 19 (2018 12:03) (14 - 20)  SpO2: 96% (2018 12:03) (94% - 100%)    I&O's Summary      PHYSICAL EXAM:    Constitutional: NAD, awake and confused , restlesss  HEENT: PERR, EOMI,  No oral cyananosis.  Neck:  supple,  No JVD  Respiratory: Breath sounds are clear bilaterally, No wheezing, rales or rhonchi  Cardiovascular: S1 and S2, irregular rate   Gastrointestinal: Bowel Sounds present, soft, nontender.   Extremities: No peripheral edema. No clubbing or cyanosis.  Vascular: 2+ peripheral pulses  Neurological:  awake , right UE weakness ,   Musculoskeletal: no calf tenderness.  Skin: No rashes.      LABS: All Labs Reviewed:                        10.0   4.8   )-----------( 138      ( 2018 05:53 )             30.2     2018 05:47    147    |  111    |  28     ----------------------------<  130    4.1     |  27     |  1.20     Ca    8.3        2018 05:47    TPro  6.5    /  Alb  3.1    /  TBili  0.4    /  DBili  x      /  AST  17     /  ALT  24     /  AlkPhos  57     2018 05:47    PT/INR - ( 2018 05:47 )   PT: 11.8 sec;   INR: 1.08 ratio         PTT - ( 2018 05:47 )  PTT:30.9 sec      Blood Culture:         RADIOLOGY/EK18  atrail fibrillation  86 BPM possible anteroseptal infarct     < from: Transthoracic Echocardiogram (16 @ 22:33) >  Conclusions:  1. Mitral annular calcification, otherwise normal mitral  valve. Mild-moderate mitral regurgitation.  2. Calcified trileaflet aortic valve with normal opening.  Moderate aortic regurgitation.  3. Severely dilated left atrium.  LA volume index = 96  cc/m2.  4. Mild concentric left ventricular hypertrophy.  5. Mild global left ventricular systolic dysfunction.  6. Severe right atrial enlargement.  7. Estimated right ventricular systolic pressure equals 41  mm Hg, assuming right atrial pressure equals 8 mm Hg,  consistent with mild pulmonary hypertension.    < end of copied text >
Date/Time Patient Seen:  		  Referring MD:   Data Reviewed	       Patient is a 86y old  Female who presents with a chief complaint of seizure. (09 Feb 2018 14:05)      Subjective/HPI  in bed  seen and examined  vs and meds reviewed  pt is frail and weak, alert and able to verbalize    planned for possible PEG    pt at this time is asking for coffee with milk    86 y.o. F found by NH having seizure episode - no h/o seizures in the past - EMS gave versed 10mg IM in the ambulance with seizure halting about 2minutes later, had already been at least 20minutes of seizure episode by then, in ED found to be febrile, information limited, pt has AMS, spoke with daughter  - [facility] states DNR/DNI/no hospitalizations/limited interventions - at this time daughter wants everything done, but not sure about invasive studies, discussed LP, will start with labs/ct head/give 1st dose abx and rediscuss with daughter.  In ED pt was found to have Right sided weakness and she was not able to speak..  NIHSS couldn't be accessed accurately.       PAST MEDICAL & SURGICAL HISTORY:  RA (rheumatoid arthritis)  Constipation  Generalized anxiety disorder  Dry eye  Chronic pain  Chronic kidney disease: stage 3 moderate  Iron deficiency anemia  GERD (gastroesophageal reflux disease)  Dementia  HTN (hypertension)  Atrial fibrillation  Heart failure  Hyperlipidemia  Osteoarthritis  Subdural hematoma  Atrial fibrillation  Age related osteoporosis  HTN (hypertension)  No significant past surgical history        Medication list         MEDICATIONS  (STANDING):  digoxin  Injectable 0.125 milliGRAM(s) IV Push daily  heparin  Injectable 5000 Unit(s) SubCutaneous every 12 hours  metoprolol    tartrate Injectable 5 milliGRAM(s) IV Push every 4 hours    MEDICATIONS  (PRN):  acetaminophen  Suppository 650 milliGRAM(s) Rectal every 6 hours PRN For Temp greater than 38 C (100.4 F)  LORazepam   Injectable 0.5 milliGRAM(s) IntraMuscular every 12 hours PRN Agitation         Vitals log        ICU Vital Signs Last 24 Hrs  T(C): 36.3 (12 Feb 2018 10:02), Max: 36.9 (11 Feb 2018 13:59)  T(F): 97.4 (12 Feb 2018 10:02), Max: 98.5 (11 Feb 2018 13:59)  HR: 86 (12 Feb 2018 10:02) (72 - 113)  BP: 177/101 (12 Feb 2018 10:02) (149/55 - 187/110)  BP(mean): --  ABP: --  ABP(mean): --  RR: 18 (12 Feb 2018 10:02) (16 - 19)  SpO2: 98% (12 Feb 2018 10:02) (92% - 99%)           Input and Output:  I&O's Detail    11 Feb 2018 07:01  -  12 Feb 2018 07:00  --------------------------------------------------------  IN:    dextrose 5% + sodium chloride 0.45% with potassium chloride 40 mEq/L: 600 mL    diltiazem Infusion: 20 mL  Total IN: 620 mL    OUT:  Total OUT: 0 mL    Total NET: 620 mL          Lab Data                        11.8   4.9   )-----------( 150      ( 12 Feb 2018 07:21 )             35.3     02-12    141  |  105  |  12  ----------------------------<  112<H>  4.1   |  24  |  0.95    Ca    9.0      12 Feb 2018 07:21    TPro  7.1  /  Alb  2.8<L>  /  TBili  0.6  /  DBili  x   /  AST  18  /  ALT  16  /  AlkPhos  70  02-12            Review of Systems	      Objective     Physical Examination    head at  heart s1s2  lungs dec BS  abd soft    frail  verbal  extr no gross edema      Pertinent Lab findings & Imaging      Jesse:  NO   Adequate UO     I&O's Detail    11 Feb 2018 07:01  -  12 Feb 2018 07:00  --------------------------------------------------------  IN:    dextrose 5% + sodium chloride 0.45% with potassium chloride 40 mEq/L: 600 mL    diltiazem Infusion: 20 mL  Total IN: 620 mL    OUT:  Total OUT: 0 mL    Total NET: 620 mL               Discussed with:     Cultures:	        Radiology          EXAM:  XR CHEST PORTABLE URGENT 1V                                  PROCEDURE DATE:  02/12/2018          INTERPRETATION:  Clinical information: Cough    Portable exam, 9:54 AM    Comparison study dated February 9, 2018.    Cardiac monitor leads present.    Loss of definition left hemidiaphragm and left costophrenic angle,   effusion and/or consolidation at the left base. Linear density persists   at left lung base, disc atelectasis versus fibrosis.    Vague airspace disease at the right lung base, trace blunting right   costophrenic angle.    Heart size appears markedly enlarged, magnified secondary to portable   technique. Aorta shows atherosclerotic changes. Degenerative changes,   both glenohumeral joints.    IMPRESSION: See above report                  RUSTAM SIEGEL M.D.,ATTENDING RADIOLOGIST  This document has been electronically signed. Feb 12 2018 10:15AM
HPI:  86 y.o. F Dementia found by NH having seizure episode - no h/o seizures in the past - EMS   gave versed in the ambulance and seizure (which lasted approx 20 minutes) stopped. In ED   pt was found to have Right sided weakness and she was obtunded. Was febrile to 101.1.No   n/v/diarrhea. UA positive. Unable to obtain history from pt. Daughter at bedside.    Infectious Disease consult was called to evaluate pt and for antibiotic management.    Past Medical & Surgical Hx:  PAST MEDICAL & SURGICAL HISTORY:  RA (rheumatoid arthritis)  Constipation  Generalized anxiety disorder  Dry eye  Chronic pain  Chronic kidney disease: stage 3 moderate  Iron deficiency anemia  GERD (gastroesophageal reflux disease)  Dementia  HTN (hypertension)  Atrial fibrillation  Heart failure  Hyperlipidemia  Osteoarthritis  Subdural hematoma  Atrial fibrillation  Age related osteoporosis  HTN (hypertension)  No significant past surgical history    Social History--  EtOH: denies   Tobacco: denies  Drug Use: denies    FAMILY HISTORY:  No pertinent family history in first degree relatives      Allergies  No Known Allergies    Home Medications:  aspirin 325 mg oral tablet: 1 tab(s) orally once a day (26 Jan 2017 08:59)  Aspirin Enteric Coated 325 mg oral delayed release tablet: 1 tab(s) orally once a day (12 Apr 2016 21:51)  carvedilol 6.25 mg oral tablet: 1 tab(s) orally every 12 hours (18 Apr 2016 14:49)  carvedilol 6.25 mg oral tablet: 1 tab(s) orally 2 times a day (26 Jan 2017 08:59)  cefTRIAXone 1 g injection:  injectable  (26 Jan 2017 08:59)  Colace 100 mg oral capsule:  orally  (26 Jan 2017 08:59)  docusate sodium 100 mg oral capsule: 1 cap(s) orally once a day (18 Apr 2016 14:59)  famotidine 10 mg oral tablet: 1 tab(s) orally 2 times a day (26 Jan 2017 08:59)  ferrous sulfate:  orally  (26 Jan 2017 08:59)  furosemide 20 mg oral tablet: 1 tab(s) orally once a day (26 Jan 2017 08:59)  furosemide 40 mg oral tablet: 1 tab(s) orally once a day (18 Apr 2016 14:55)  lisinopril 5 mg oral tablet: 1 tab(s) orally once a day (11 Dec 2014 21:01)  lisinopril 5 mg oral tablet: 1 tab(s) orally once a day (26 Jan 2017 08:59)  Oyst-Joe-D 500 mg-200 intl units oral tablet: 1 tab(s) orally once a day (11 Dec 2014 21:01)  risperiDONE 1 mg oral tablet: 1 tab(s) orally 2 times a day (26 Jan 2017 08:59)  senna oral tablet: 2 tab(s) orally once a day (26 Jan 2017 08:59)  senna oral tablet: 2 tab(s) orally once a day (at bedtime) (18 Apr 2016 14:59)  Tylenol 325 mg oral tablet:  orally , As Needed (26 Jan 2017 08:59)      Current Inpatient Medications :    ANTIBIOTICS:   cefTRIAXone   IVPB 1 Gram(s) IV Intermittent every 24 hours    OTHER RELEVANT MEDICATIONS :  aspirin 325 milliGRAM(s) Oral daily  dextrose 5% + sodium chloride 0.45%. 1000 milliLiter(s) IV Continuous <Continuous>  heparin  Injectable 5000 Unit(s) SubCutaneous every 12 hours  metoprolol    tartrate Injectable 2.5 milliGRAM(s) IV Push every 6 hours      ROS:  Unable to obtain due to : Obtunded    I&O's Detail    06 Feb 2018 07:01  -  06 Feb 2018 16:16  --------------------------------------------------------  IN:    dextrose 5% + sodium chloride 0.45%.: 350 mL  Total IN: 350 mL    OUT:  Total OUT: 0 mL    Total NET: 350 mL    Physical Exam:  Vital Signs Last 24 Hrs  T(C): 36.9 (06 Feb 2018 14:00), Max: 38.4 (06 Feb 2018 04:35)  T(F): 98.5 (06 Feb 2018 14:00), Max: 101.1 (06 Feb 2018 04:35)  HR: 74 (06 Feb 2018 14:00) (74 - 109)  BP: 143/85 (06 Feb 2018 14:00) (123/73 - 167/97)  BP(mean): --  RR: 18 (06 Feb 2018 14:00) (14 - 20)  SpO2: 96% (06 Feb 2018 14:00) (94% - 100%)  Height (cm): 144.78 (02-06 @ 07:20)  Weight (kg): 40.8 (02-06 @ 07:20)  BMI (kg/m2): 19.5 (02-06 @ 07:20)  BSA (m2): 1.28 (02-06 @ 07:20)    General: Obtunded  Eyes: sclera anicteric, pupils equal and reactive to light  ENMT: buccal mucosa moist, pharynx not injected  Neck: supple, trachea midline  Lungs: Decreased, no wheeze/rhonchi  Cardiovascular: regular rate and rhythm, S1 S2  Abdomen: soft, nontender, no organomegaly present, bowel sounds normal  Neurological: Obtunded  Skin: no increased ecchymosis/petechiae/purpura  Lymph Nodes: no palpable cervical/supraclavicular lymph nodes enlargements  Extremities: no cyanosis/clubbing  Trace edema    Labs:                         10.0   4.8   )-----------( 138      ( 06 Feb 2018 05:53 )             30.2   02-06    147<H>  |  111<H>  |  28<H>  ----------------------------<  130<H>  4.1   |  27  |  1.20    Ca    8.3<L>      06 Feb 2018 05:47    TPro  6.5  /  Alb  3.1<L>  /  TBili  0.4  /  DBili  x   /  AST  17  /  ALT  24  /  AlkPhos  57  02-06      RECENT CULTURES:  PENDING    RADIOLOGY & ADDITIONAL STUDIES:  EXAM:  CT BRAIN                          PROCEDURE DATE:  02/06/2018      INTERPRETATION:  EXAM: CT HEAD WITHOUT INTRAVENOUS CONTRAST    CLINICAL INFORMATION: First-time seizure.    TECHNIQUE: Noncontrast axial CT images were acquired through the head.   04/13/2016.    COMPARISON STUDY: None    FINDINGS:   A chronic, right frontal parietal low-attenuation subdural collection   measuring up to approximately 2 cm in thickness (5:31) is grossly stable   from 04/13/2016.  A chronic leftfrontal parietal low-attenuation subdural collection   measuring up to approximately 1.2 cm in thickness is grossly stable since   04/13/2016.  Mild mass effect on the bilateral frontal-probably convexities is stable   since 04/13/2016  A chronic right occipital lobe infarct is stable since 04/13/2016.  There is a chronic appearing infarct in the posterior inferior left   parietal lobe, new since 04/13/2016.    There is no CT evidence of acute intracranial hemorrhage, brain edema,   midline shift, central herniation, hydrocephalus or acute territorial   infarct.     There is mild generalized cerebral volume loss and mild periventricular   white matter hypoattenuation compatible chronic microvascular ischemic   disease.  There are scattered lacunar infarcts in the basal ganglia and   anterior limbs of internal capsules bilaterally.      The visualized paranasal sinuses are clear.    The mastoid air cells and middle ear cavities are grossly clear.    The calvarium, skull base, visualized facial bones and regional soft   tissues are grossly unremarkable.    IMPRESSION:   1.  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  2.  A chronic right occipital lobe infarct is stable since 04/13/2016.  A   chronic left parietal infarct is new since 04/13/2016.  3.  No CT evidence of acute intracranial hemorrhage, brain edema, brain   herniation, hydrocephalus or acute territorial infarct.      EXAM:  XR CHEST PORTABLE URGENT 1V                          PROCEDURE DATE:  02/06/2018      INTERPRETATION:  Clinical information: Fever, seizure.    Portable study, 5:48 AM.    Comparison exam dated 4/12/2016.    Cardiac monitor leadspresent. Marked cardiac enlargement. No lobar   consolidation vascular congestion or effusion. Hilar regions, mediastinal   contours intact. Aorta shows atherosclerotic changes. Advanced   degenerative changes both glenohumeral joints.    IMPRESSION: See above report    Assessment :   86 y.o. F Dementia found by NH having seizure episode sec to poss Left MCA CVA   Seen by neurology  Fever could be reactive to seizure  Rule out UTI  CXR clear    Plan :   Cont Rocephin for now  Fu cultures  Trend temps  Aspiration precautions    Continue with present regiment .  Appropriate use of antibiotics and adverse effects reviewed.      I have discussed the above plan of care with patient's family in detail. They expressed understanding of the treatment plan . Risks, benefits and alternatives discussed in detail. I have asked if they have any questions or concerns and appropriately addressed them to the best of my ability .      > 45 minutes spent in direct patient care reviewing  the notes, lab data/ imaging , discussion with multidisciplinary team. All questions were addressed and answered to the best of my capacity .    Thank you for allowing me to participate in the care of your patient .      Amira Baker MD  Infectious Disease  [phone]
Patient is a 86y old  Female who presents with a chief complaint of     HPI:86 y.o. F found by NH having seizure episode - no h/o seizures in the past - EMS gave versed 10mg IM in the ambulance with seizure halting about 2minutes later, had already been at least 20minutes of seizure episode by then, in ED found to be febrile, information limited, pt unresponsive at this time, spoke with daughter - HCP Maida Perea on the phone - MOLST states DNR/DNI/no hospitalizations/limited interventions - at this time daughter wants everything done, but not sure about invasive studies, discussed LP, will start with labs/ct head/give 1st dose abx and rediscuss with daughter.  In ED pt was found to have Right sided weakness and she was not able to speak..  NIHSS couldn't be accessed accurately.  Pt is not a tPa candidate as her symptoms onset time is NOT known.  Daughter is at bedside.  In ED pt has elevated serum Na. Volume depletion and also signs of UTI.  	  PAST MEDICAL & SURGICAL HISTORY:  RA (rheumatoid arthritis)  Constipation  Generalized anxiety disorder  Dry eye  Chronic pain  Chronic kidney disease: stage 3 moderate  Iron deficiency anemia  GERD (gastroesophageal reflux disease)  Dementia  HTN (hypertension)  Atrial fibrillation  Heart failure  Hyperlipidemia  Osteoarthritis  Subdural hematoma  Atrial fibrillation  Age related osteoporosis  HTN (hypertension)  No significant past surgical history      MEDICATIONS  (STANDING):  aspirin 325 milliGRAM(s) Oral daily  aspirin Suppository 300 milliGRAM(s) Rectal once  cefTRIAXone   IVPB 1 Gram(s) IV Intermittent every 24 hours  dextrose 5% + sodium chloride 0.45%. 1000 milliLiter(s) (50 mL/Hr) IV Continuous <Continuous>  heparin  Injectable 5000 Unit(s) SubCutaneous every 12 hours    MEDICATIONS  (PRN):  Allergies    No Known Allergies    SOCIAL HISTORY:    No h/o Smoking.   No h/o alcohol use.  FAMILY HISTORY:  No pertinent family history in first degree relatives      REVIEW OF SYSTEMS:    CONSTITUTIONAL: No fever  EYES: No eye pain,   ENMT:  No sinus or throat pain  NECK: No pain or stiffness  RESPIRATORY: No cough, No hemoptysis; No shortness of breath  CARDIOVASCULAR: No acute chest pain, palpitations,  or leg swelling  GASTROINTESTINAL: No abdominal pain. No nausea, vomiting, or hematemesis;  No melena or hematochezia.  GENITOURINARY: No  hematuria, or incontinence  MUSCULOSKELETAL: No joint swelling; No extremity pain  SKIN: No itching, rashes, or lesions   LYMPH NODES: No enlarged glands  NEUROLOGICAL: No headaches,H/O memory loss,   PSYCHIATRIC: No depression, anxiety, mood swings, or difficulty sleeping  ENDOCRINE: No heat or cold intolerance;   HEME/LYMPH: No easy bruising, or bleeding gums  Allergy/Immunology. No medication allergy. No seasonal allergies.    PHYSICAL EXAM:  Vital Signs Last 24 Hrs  T(F): 97.6 (18 @ 06:26)  HR: 94 (18 @ 07:20)  BP: 156/72 (18 @ 07:20)  RR: 18 (18 @ 07:20)    GENERAL: NAD, well-groomed, well-developed  HEAD:  Atraumatic, Normocephalic  EYES: EOMI, PERRLA, conjunctiva and sclera clear  NECK: Supple, No JVD, thyroid non-palpable    On Neurological Examination:    Mental Status - Awake. Non verbal currently. Follows simple commands inconsistently.    Speech - Non verbal currently.    Cranial Nerves - Pupils 3 mm equal and reactive to light. Pt has no obvious facial asymmetry. Tongue - is in midline.    Motor Exam - Moves left sided extremities equally. Right UE 0/5.      Sensory Exam - Pt withdraws all extremities equally on stimulation.     Gait - Couldn't be accessed.    Deep tendon Reflexes - 2 plus all over.    Neck Supple -  Yes.    LABS:                        10.0   4.8   )-----------( 138      ( 2018 05:53 )             30.2     02-    147<H>  |  111<H>  |  28<H>  ----------------------------<  130<H>  4.1   |  27  |  1.20    Ca    8.3<L>      2018 05:47    TPro  6.5  /  Alb  3.1<L>  /  TBili  0.4  /  DBili  x   /  AST  17  /  ALT  24  /  AlkPhos  57  02-06    PT/INR - ( 2018 05:47 )   PT: 11.8 sec;   INR: 1.08 ratio         PTT - ( 2018 05:47 )  PTT:30.9 sec  Urinalysis Basic - ( 2018 05:56 )    Color: Yellow / Appearance: Clear / S.015 / pH: x  Gluc: x / Ketone: Negative  / Bili: Negative / Urobili: Negative mg/dL   Blood: x / Protein: 100 mg/dL / Nitrite: Positive   Leuk Esterase: Small / RBC: 0-2 /HPF / WBC 11-25   Sq Epi: x / Non Sq Epi: Few / Bacteria: Moderate    RADIOLOGY & ADDITIONAL STUDIES:    < from: CT Head No Cont (18 @ 06:27) >  IMPRESSION:   1.  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  2.  A chronic right occipital lobe infarct is stable since 2016.  A   chronic left parietal infarct is new since 2016.  3.  No CT evidence of acute intracranial hemorrhage, brain edema, brain   herniation, hydrocephalus or acute territorial infarct.      < end of copied text >

## 2018-02-12 NOTE — PROGRESS NOTE ADULT - ASSESSMENT
Seen for seizure/AMS/Right sided weakness/Global aphasia - Much improved.  CT Head - No acute pathology. Old Infarcts.  CVA in Left MCA area was suspected clinically, but reapeat CT Head is negative.  Did pt have David'd paralysis?  Limited CVA w/up is done with agreement of daughter.  Pt does have lacunar Infarct on left Internal capsule which is reported as old.   Carotid doppler/2D Echo will NOT be done.   Any further seizure  - would begin Keppra.  Pt is DNR/DNI.   MI  SLP f/up - If pt is able to take medication by mouth - ASA will be reduced to 81 mg PO.  Ecoli UTI On antibiotics.  PEG is planned for tomorrow.  Neurologically pt is cleared for the procedure.  D/w daughter at bedside.  D/w Dr. Peacock

## 2018-02-12 NOTE — CHART NOTE - NSCHARTNOTEFT_GEN_A_CORE
Assessment: Pt remains NPO as pt failed multiple SLP evaluations. Pt failed reevaluation as pt c oropharyngeal dysphagia w/ no swallow reflex. Per GI MD note, PEG to be discussed c family (call was placed, awaiting call back). Family noted to be considering comfort care last week. Recommend starting NGT feedings as pt has been NPO x 7 days, if family is agreeable. If NGT/PEG placement is wished, recommend Jevity 1.2 start @30ml/hr increase by 10ml every 6 hrs until goal of 45ml/hr is reached (to provide 1296kcal based on 30kcal/kg upper IBW, ~60g protein based on 1.4g/kg upper IBW). RD to follow closely.    Factors impacting intake: [ ] none [ ] nausea  [ ] vomiting [ ] diarrhea [ ] constipation  [ ]chewing problems [ ] swallowing issues  [ ] other:     Diet Presciption: Diet, NPO (02-06-18 @ 09:50)    Intake: NPO x 7 days    Current Weight: Weight (kg): 40.8 (02-06 @ 07:20)  % Weight Change    Pertinent Medications: MEDICATIONS  (STANDING):  digoxin  Injectable 0.125 milliGRAM(s) IV Push daily  heparin  Injectable 5000 Unit(s) SubCutaneous every 12 hours  metoprolol    tartrate Injectable 5 milliGRAM(s) IV Push every 4 hours    MEDICATIONS  (PRN):  acetaminophen  Suppository 650 milliGRAM(s) Rectal every 6 hours PRN For Temp greater than 38 C (100.4 F)  hydrALAZINE Injectable 5 milliGRAM(s) IV Push every 6 hours PRN SBP>180  LORazepam   Injectable 0.5 milliGRAM(s) IntraMuscular every 12 hours PRN Agitation    Pertinent Labs: 02-12 Na141 mmol/L Glu 112 mg/dL<H> K+ 4.1 mmol/L Cr  0.95 mg/dL BUN 12 mg/dL Phos n/a   Alb 2.8 g/dL<L> PAB n/a        CAPILLARY BLOOD GLUCOSE      POCT Blood Glucose.: 123 mg/dL (11 Feb 2018 16:16)    Skin: intact    Estimated Needs:   [x ] no change since previous assessment  [ ] recalculated:     Previous Nutrition Diagnosis:   [ ] Inadequate Energy Intake [ ]Inadequate Oral Intake [ ] Excessive Energy Intake   [ ] Underweight [ ] Increased Nutrient Needs [ ] Overweight/Obesity [x]swallowing difficulty  [ ] Altered GI Function [ ] Unintended Weight Loss [ ] Food & Nutrition Related Knowledge Deficit [ ] Malnutrition     Nutrition Diagnosis is [x ] ongoing  [ ] resolved [ ] not applicable     New Nutrition Diagnosis: [ ] not applicable       Interventions: NPO status / comfort care vs. peg placement  Recommend  [ ] Change Diet To:  [ ] Nutrition Supplement  [ ] Nutrition Support  [ ] Other:     Monitoring and Evaluation:   [ ] PO intake [ ] Tolerance to diet prescription [ x ] weights [ x ] labs[ x ] follow up per protocol  [ x] other: family wishes re: peg

## 2018-02-12 NOTE — PROGRESS NOTE ADULT - ASSESSMENT
86 female with    1. CVA/dementia with behavioral disturbance: appreciate neuro recs. speech and swallow reeval done - FAILED. daughter wants PEG.  NGT will likely make patient more agitated. and she would constantly pull at it requiring wrist restraints.    2. chronic a.fib: meds as per cardiology.    3. elevated BNP: lasix 20mg iv once given    4. GOC: DNR DNI. daughter wants PEG.  explained ciro-procedure risks - high risk candidate for low risk procedure - daughter understands that the risks include ciro-procedure mortality.    5. CKD III: avoid nephrotoxics. monitor intake and output.     6. heparin SQ for dvt ppx    7. poor overall prognosis.  will d/w daughter plan of care.  may be hospice appropriate if any further worsening.

## 2018-02-12 NOTE — CHART NOTE - NSCHARTNOTEFT_GEN_A_CORE
Called by RN for abnormal rhythm, strip reviewed, shows 4 beats run of wide complex tachycardia, ML a. fib with aberrancy. Pt is asymptomatic, vitals are stable., will continue telemetry monitoring. Patient is being followed by cardiology.

## 2018-02-12 NOTE — PROGRESS NOTE ADULT - ASSESSMENT
86 f w/ dementia, cardiomyopathy  seizures-c/b dysphagia.   swallow f/u monday-pending.  will d/w family re peg RBIA. call placed to daughter, await call back.   Hold ASA in anticipation for peg this week if cleared and consented.

## 2018-02-12 NOTE — PROGRESS NOTE ADULT - ASSESSMENT
PROBLEM Dx:  Other cardiomyopathy: Other cardiomyopathy  Metabolic encephalopathy: Metabolic encephalopathy  Essential hypertension: Essential hypertension  Acute cystitis without hematuria: Acute cystitis without hematuria  Altered mental status, unspecified altered mental status type: Altered mental status, unspecified altered mental status type  Cardiomyopathy: Cardiomyopathy  CVA, old, cognitive deficits: CVA, old, cognitive deficits  Chronic atrial fibrillation: Chronic atrial fibrillation  Febrile seizure: Febrile seizure    DISCUSSION:      ·  Chronic atrial fibrillation.   HR mostly controlled around /mt    unable to take PO meds;   Will add low dose IV digoxin and monitor her response.       ·  Cardiomyopathy.    Mild/borderline LV systolic function;   continue metoprolol; add ACE-I when able to take oral meds    · Hypertension:    Will observe and titrate antihypertensive further when able to take PO meds..    case discussed with raya

## 2018-02-12 NOTE — CONSULT NOTE ADULT - CONSULT REASON
dysphagia
AMS/Right sided weakness
Atrial fibrillation
Fever
dementia  atelectasis  frailty  dysphagia

## 2018-02-13 ENCOUNTER — TRANSCRIPTION ENCOUNTER (OUTPATIENT)
Age: 83
End: 2018-02-13

## 2018-02-13 DIAGNOSIS — I50.9 HEART FAILURE, UNSPECIFIED: ICD-10-CM

## 2018-02-13 LAB
ALBUMIN SERPL ELPH-MCNC: 3 G/DL — LOW (ref 3.3–5)
ALP SERPL-CCNC: 70 U/L — SIGNIFICANT CHANGE UP (ref 30–120)
ALT FLD-CCNC: 21 U/L DA — SIGNIFICANT CHANGE UP (ref 10–60)
ANION GAP SERPL CALC-SCNC: 13 MMOL/L — SIGNIFICANT CHANGE UP (ref 5–17)
AST SERPL-CCNC: 18 U/L — SIGNIFICANT CHANGE UP (ref 10–40)
BASOPHILS # BLD AUTO: 0.1 K/UL — SIGNIFICANT CHANGE UP (ref 0–0.2)
BASOPHILS NFR BLD AUTO: 1.8 % — SIGNIFICANT CHANGE UP (ref 0–2)
BILIRUB SERPL-MCNC: 0.6 MG/DL — SIGNIFICANT CHANGE UP (ref 0.2–1.2)
BUN SERPL-MCNC: 21 MG/DL — SIGNIFICANT CHANGE UP (ref 7–23)
CALCIUM SERPL-MCNC: 9.3 MG/DL — SIGNIFICANT CHANGE UP (ref 8.4–10.5)
CHLORIDE SERPL-SCNC: 105 MMOL/L — SIGNIFICANT CHANGE UP (ref 96–108)
CO2 SERPL-SCNC: 23 MMOL/L — SIGNIFICANT CHANGE UP (ref 22–31)
CREAT SERPL-MCNC: 1.15 MG/DL — SIGNIFICANT CHANGE UP (ref 0.5–1.3)
EOSINOPHIL # BLD AUTO: 0.1 K/UL — SIGNIFICANT CHANGE UP (ref 0–0.5)
EOSINOPHIL NFR BLD AUTO: 1.2 % — SIGNIFICANT CHANGE UP (ref 0–6)
GLUCOSE SERPL-MCNC: 88 MG/DL — SIGNIFICANT CHANGE UP (ref 70–99)
HCT VFR BLD CALC: 41 % — SIGNIFICANT CHANGE UP (ref 34.5–45)
HGB BLD-MCNC: 13.6 G/DL — SIGNIFICANT CHANGE UP (ref 11.5–15.5)
LYMPHOCYTES # BLD AUTO: 1 K/UL — SIGNIFICANT CHANGE UP (ref 1–3.3)
LYMPHOCYTES # BLD AUTO: 19.7 % — SIGNIFICANT CHANGE UP (ref 13–44)
MCHC RBC-ENTMCNC: 29.7 PG — SIGNIFICANT CHANGE UP (ref 27–34)
MCHC RBC-ENTMCNC: 33.1 GM/DL — SIGNIFICANT CHANGE UP (ref 32–36)
MCV RBC AUTO: 90 FL — SIGNIFICANT CHANGE UP (ref 80–100)
MONOCYTES # BLD AUTO: 0.4 K/UL — SIGNIFICANT CHANGE UP (ref 0–0.9)
MONOCYTES NFR BLD AUTO: 8.9 % — SIGNIFICANT CHANGE UP (ref 2–14)
NEUTROPHILS # BLD AUTO: 3.3 K/UL — SIGNIFICANT CHANGE UP (ref 1.8–7.4)
NEUTROPHILS NFR BLD AUTO: 68.4 % — SIGNIFICANT CHANGE UP (ref 43–77)
PLATELET # BLD AUTO: 189 K/UL — SIGNIFICANT CHANGE UP (ref 150–400)
POTASSIUM SERPL-MCNC: 3.6 MMOL/L — SIGNIFICANT CHANGE UP (ref 3.5–5.3)
POTASSIUM SERPL-SCNC: 3.6 MMOL/L — SIGNIFICANT CHANGE UP (ref 3.5–5.3)
PROT SERPL-MCNC: 7.5 G/DL — SIGNIFICANT CHANGE UP (ref 6–8.3)
RBC # BLD: 4.56 M/UL — SIGNIFICANT CHANGE UP (ref 3.8–5.2)
RBC # FLD: 12.6 % — SIGNIFICANT CHANGE UP (ref 10.3–14.5)
SODIUM SERPL-SCNC: 141 MMOL/L — SIGNIFICANT CHANGE UP (ref 135–145)
WBC # BLD: 4.9 K/UL — SIGNIFICANT CHANGE UP (ref 3.8–10.5)
WBC # FLD AUTO: 4.9 K/UL — SIGNIFICANT CHANGE UP (ref 3.8–10.5)

## 2018-02-13 PROCEDURE — 12345: CPT | Mod: NC

## 2018-02-13 PROCEDURE — 99233 SBSQ HOSP IP/OBS HIGH 50: CPT

## 2018-02-13 RX ORDER — DIGOXIN 250 MCG
0.12 TABLET ORAL DAILY
Qty: 0 | Refills: 0 | Status: DISCONTINUED | OUTPATIENT
Start: 2018-02-13 | End: 2018-02-14

## 2018-02-13 RX ORDER — SODIUM CHLORIDE 9 MG/ML
1000 INJECTION, SOLUTION INTRAVENOUS
Qty: 0 | Refills: 0 | Status: DISCONTINUED | OUTPATIENT
Start: 2018-02-13 | End: 2018-02-15

## 2018-02-13 RX ORDER — CEFAZOLIN SODIUM 1 G
1000 VIAL (EA) INJECTION ONCE
Qty: 0 | Refills: 0 | Status: COMPLETED | OUTPATIENT
Start: 2018-02-14 | End: 2018-02-14

## 2018-02-13 RX ADMIN — Medication 1.25 MILLIGRAM(S): at 12:55

## 2018-02-13 RX ADMIN — Medication 5 MILLIGRAM(S): at 05:06

## 2018-02-13 RX ADMIN — Medication 0.12 MILLIGRAM(S): at 06:03

## 2018-02-13 RX ADMIN — Medication 5 MILLIGRAM(S): at 14:29

## 2018-02-13 RX ADMIN — Medication 5 MILLIGRAM(S): at 21:34

## 2018-02-13 RX ADMIN — Medication 5 MILLIGRAM(S): at 03:00

## 2018-02-13 RX ADMIN — Medication 5 MILLIGRAM(S): at 17:25

## 2018-02-13 RX ADMIN — Medication 1.25 MILLIGRAM(S): at 05:05

## 2018-02-13 RX ADMIN — Medication 1.25 MILLIGRAM(S): at 17:24

## 2018-02-13 RX ADMIN — Medication 5 MILLIGRAM(S): at 09:58

## 2018-02-13 RX ADMIN — SODIUM CHLORIDE 50 MILLILITER(S): 9 INJECTION, SOLUTION INTRAVENOUS at 13:01

## 2018-02-13 RX ADMIN — HEPARIN SODIUM 5000 UNIT(S): 5000 INJECTION INTRAVENOUS; SUBCUTANEOUS at 05:06

## 2018-02-13 RX ADMIN — Medication 1.25 MILLIGRAM(S): at 00:29

## 2018-02-13 RX ADMIN — Medication 5 MILLIGRAM(S): at 01:38

## 2018-02-13 RX ADMIN — HEPARIN SODIUM 5000 UNIT(S): 5000 INJECTION INTRAVENOUS; SUBCUTANEOUS at 17:24

## 2018-02-13 NOTE — PROGRESS NOTE ADULT - MENTAL STATUS
Pt is lethargic, unable to perform any examination.
pt is more awake, responding some verbal command.  moving her four extremities, right less then left.
pt is confused, moving her four extremities.  +global aphagia.
unable to perform due to AMS.

## 2018-02-13 NOTE — PROGRESS NOTE ADULT - ASSESSMENT
·  Chronic atrial fibrillation:  Fair HR control -- still with episodes of tachycardia; continue IV metoprolol and digoxin -- change to enteral route when able to take oral meds or PEG placed.      ·  Cardiomyopathy.  Mild/borderline LV systolic function; continue metoprolol; add ACE-I when able to take enteral meds    · Hypertension: Fair control / improved; continue IV metoprolol.    · Pre-op cardiac exam:  Patient has several clinical predictors of perioperative cardiac risk -- appears as optimal as is reasonably attainable for PEG placement; continue metoprolol.

## 2018-02-13 NOTE — PROGRESS NOTE ADULT - ASSESSMENT
86 f w/ dementia, cardiomyopathy  seizures-c/b dysphagia.   swallow f/u noted.   had conversation w/ daughter (hcp) regarding RBiA of peg     pt  high risk for cardio-pulmonary complications and daughter fully understands the risk.   will plan for peg tomorrow 2/14  IV ancef on call  Hold ASA in anticipation for peg this week if cleared and consented.

## 2018-02-13 NOTE — PROGRESS NOTE ADULT - GASTROINTESTINAL
Soft, non-tender, no hepatosplenomegaly, normal bowel sounds
Soft, non-tender, no hepatosplenomegaly, normal bowel sounds
detailed exam

## 2018-02-13 NOTE — PROGRESS NOTE ADULT - ASSESSMENT
86 female with    1. CVA/dementia with behavioral disturbance: appreciate neuro recs. speech and swallow reeval done - FAILED. daughter wants PEG.  NGT will likely make patient more agitated. and she would constantly pull at it requiring wrist restraints.    2. chronic a.fib: meds as per cardiology.    3. elevated BNP: lasix 20mg iv once given    4. GOC: DNR DNI. daughter wants PEG.  explained ciro-procedure risks - high risk candidate for low risk procedure - daughter understands that the risks include ciro-procedure mortality.    5. CKD III: avoid nephrotoxics. monitor intake and output.     6. heparin SQ for dvt ppx    7. poor overall prognosis.   Pt is medically optimized for surgery in am with high risk. case d/w pt's daughter.  may be hospice appropriate if any further worsening. 86 female with    1. CVA/dementia with behavioral disturbance: appreciate neuro recs. speech and swallow reeval done - FAILED. daughter wants PEG.  NGT will likely make patient more agitated. and she would constantly pull at it requiring wrist restraints.    2. chronic a.fib: meds as per cardiology.    3. elevated BNP: lasix 20mg iv once given    4. GOC: DNR DNI. daughter wants PEG.  explained ciro-procedure risks - high risk candidate for low risk procedure - daughter understands that the risks include ciro-procedure mortality.    5. CKD III: avoid nephrotoxics. monitor intake and output.     6. heparin SQ for dvt ppx    7. poor overall prognosis.   Pt is medically optimized for surgery in am with high risk pending pulmonary clearance.case d/w pt's daughter. d/w .  may be hospice appropriate if any further worsening.

## 2018-02-14 ENCOUNTER — TRANSCRIPTION ENCOUNTER (OUTPATIENT)
Age: 83
End: 2018-02-14

## 2018-02-14 LAB
ALBUMIN SERPL ELPH-MCNC: 3 G/DL — LOW (ref 3.3–5)
ALP SERPL-CCNC: 68 U/L — SIGNIFICANT CHANGE UP (ref 30–120)
ALT FLD-CCNC: 18 U/L DA — SIGNIFICANT CHANGE UP (ref 10–60)
ANION GAP SERPL CALC-SCNC: 12 MMOL/L — SIGNIFICANT CHANGE UP (ref 5–17)
AST SERPL-CCNC: 16 U/L — SIGNIFICANT CHANGE UP (ref 10–40)
BASOPHILS # BLD AUTO: 0.1 K/UL — SIGNIFICANT CHANGE UP (ref 0–0.2)
BASOPHILS NFR BLD AUTO: 0.9 % — SIGNIFICANT CHANGE UP (ref 0–2)
BILIRUB SERPL-MCNC: 0.6 MG/DL — SIGNIFICANT CHANGE UP (ref 0.2–1.2)
BUN SERPL-MCNC: 28 MG/DL — HIGH (ref 7–23)
CALCIUM SERPL-MCNC: 8.7 MG/DL — SIGNIFICANT CHANGE UP (ref 8.4–10.5)
CHLORIDE SERPL-SCNC: 107 MMOL/L — SIGNIFICANT CHANGE UP (ref 96–108)
CK MB BLD-MCNC: 4.1 % — HIGH (ref 0–3.5)
CK MB CFR SERPL CALC: 1.8 NG/ML — SIGNIFICANT CHANGE UP (ref 0–3.6)
CK SERPL-CCNC: 44 U/L — SIGNIFICANT CHANGE UP (ref 26–192)
CO2 SERPL-SCNC: 24 MMOL/L — SIGNIFICANT CHANGE UP (ref 22–31)
CREAT SERPL-MCNC: 1.19 MG/DL — SIGNIFICANT CHANGE UP (ref 0.5–1.3)
EOSINOPHIL # BLD AUTO: 0 K/UL — SIGNIFICANT CHANGE UP (ref 0–0.5)
EOSINOPHIL NFR BLD AUTO: 0.1 % — SIGNIFICANT CHANGE UP (ref 0–6)
GLUCOSE SERPL-MCNC: 138 MG/DL — HIGH (ref 70–99)
HCT VFR BLD CALC: 37.1 % — SIGNIFICANT CHANGE UP (ref 34.5–45)
HCT VFR BLD CALC: 37.4 % — SIGNIFICANT CHANGE UP (ref 34.5–45)
HGB BLD-MCNC: 11.9 G/DL — SIGNIFICANT CHANGE UP (ref 11.5–15.5)
HGB BLD-MCNC: 12.4 G/DL — SIGNIFICANT CHANGE UP (ref 11.5–15.5)
LYMPHOCYTES # BLD AUTO: 0.9 K/UL — LOW (ref 1–3.3)
LYMPHOCYTES # BLD AUTO: 13.7 % — SIGNIFICANT CHANGE UP (ref 13–44)
MCHC RBC-ENTMCNC: 29.6 PG — SIGNIFICANT CHANGE UP (ref 27–34)
MCHC RBC-ENTMCNC: 30.2 PG — SIGNIFICANT CHANGE UP (ref 27–34)
MCHC RBC-ENTMCNC: 32.1 GM/DL — SIGNIFICANT CHANGE UP (ref 32–36)
MCHC RBC-ENTMCNC: 33 GM/DL — SIGNIFICANT CHANGE UP (ref 32–36)
MCV RBC AUTO: 91.6 FL — SIGNIFICANT CHANGE UP (ref 80–100)
MCV RBC AUTO: 92.2 FL — SIGNIFICANT CHANGE UP (ref 80–100)
MONOCYTES # BLD AUTO: 0.5 K/UL — SIGNIFICANT CHANGE UP (ref 0–0.9)
MONOCYTES NFR BLD AUTO: 7.8 % — SIGNIFICANT CHANGE UP (ref 2–14)
NEUTROPHILS # BLD AUTO: 5.2 K/UL — SIGNIFICANT CHANGE UP (ref 1.8–7.4)
NEUTROPHILS NFR BLD AUTO: 77.4 % — HIGH (ref 43–77)
PLATELET # BLD AUTO: 186 K/UL — SIGNIFICANT CHANGE UP (ref 150–400)
PLATELET # BLD AUTO: 186 K/UL — SIGNIFICANT CHANGE UP (ref 150–400)
POTASSIUM SERPL-MCNC: 3.5 MMOL/L — SIGNIFICANT CHANGE UP (ref 3.5–5.3)
POTASSIUM SERPL-SCNC: 3.5 MMOL/L — SIGNIFICANT CHANGE UP (ref 3.5–5.3)
PROT SERPL-MCNC: 6.6 G/DL — SIGNIFICANT CHANGE UP (ref 6–8.3)
RBC # BLD: 4.03 M/UL — SIGNIFICANT CHANGE UP (ref 3.8–5.2)
RBC # BLD: 4.09 M/UL — SIGNIFICANT CHANGE UP (ref 3.8–5.2)
RBC # FLD: 12.2 % — SIGNIFICANT CHANGE UP (ref 10.3–14.5)
RBC # FLD: 12.4 % — SIGNIFICANT CHANGE UP (ref 10.3–14.5)
SODIUM SERPL-SCNC: 143 MMOL/L — SIGNIFICANT CHANGE UP (ref 135–145)
TROPONIN I SERPL-MCNC: 0.17 NG/ML — HIGH (ref 0.02–0.06)
WBC # BLD: 6.7 K/UL — SIGNIFICANT CHANGE UP (ref 3.8–10.5)
WBC # BLD: 7.1 K/UL — SIGNIFICANT CHANGE UP (ref 3.8–10.5)
WBC # FLD AUTO: 6.7 K/UL — SIGNIFICANT CHANGE UP (ref 3.8–10.5)
WBC # FLD AUTO: 7.1 K/UL — SIGNIFICANT CHANGE UP (ref 3.8–10.5)

## 2018-02-14 PROCEDURE — 43246 EGD PLACE GASTROSTOMY TUBE: CPT

## 2018-02-14 PROCEDURE — 99233 SBSQ HOSP IP/OBS HIGH 50: CPT

## 2018-02-14 PROCEDURE — 12345: CPT | Mod: NC

## 2018-02-14 RX ORDER — METOPROLOL TARTRATE 50 MG
25 TABLET ORAL
Qty: 0 | Refills: 0 | Status: DISCONTINUED | OUTPATIENT
Start: 2018-02-14 | End: 2018-02-15

## 2018-02-14 RX ORDER — SODIUM CHLORIDE 9 MG/ML
1000 INJECTION, SOLUTION INTRAVENOUS
Qty: 0 | Refills: 0 | Status: DISCONTINUED | OUTPATIENT
Start: 2018-02-14 | End: 2018-02-14

## 2018-02-14 RX ORDER — DIGOXIN 250 MCG
0.12 TABLET ORAL DAILY
Qty: 0 | Refills: 0 | Status: DISCONTINUED | OUTPATIENT
Start: 2018-02-14 | End: 2018-02-15

## 2018-02-14 RX ORDER — ACETAMINOPHEN 500 MG
650 TABLET ORAL EVERY 6 HOURS
Qty: 0 | Refills: 0 | Status: DISCONTINUED | OUTPATIENT
Start: 2018-02-14 | End: 2018-02-15

## 2018-02-14 RX ORDER — METOPROLOL TARTRATE 50 MG
2.5 TABLET ORAL ONCE
Qty: 0 | Refills: 0 | Status: COMPLETED | OUTPATIENT
Start: 2018-02-14 | End: 2018-02-14

## 2018-02-14 RX ORDER — HYDROMORPHONE HYDROCHLORIDE 2 MG/ML
0.25 INJECTION INTRAMUSCULAR; INTRAVENOUS; SUBCUTANEOUS ONCE
Qty: 0 | Refills: 0 | Status: DISCONTINUED | OUTPATIENT
Start: 2018-02-14 | End: 2018-02-14

## 2018-02-14 RX ADMIN — HEPARIN SODIUM 5000 UNIT(S): 5000 INJECTION INTRAVENOUS; SUBCUTANEOUS at 18:12

## 2018-02-14 RX ADMIN — Medication 5 MILLIGRAM(S): at 00:53

## 2018-02-14 RX ADMIN — Medication 1.25 MILLIGRAM(S): at 06:15

## 2018-02-14 RX ADMIN — Medication 0.12 MILLIGRAM(S): at 14:11

## 2018-02-14 RX ADMIN — Medication 2.5 MILLIGRAM(S): at 14:41

## 2018-02-14 RX ADMIN — Medication 5 MILLIGRAM(S): at 15:19

## 2018-02-14 RX ADMIN — Medication 5 MILLIGRAM(S): at 19:03

## 2018-02-14 RX ADMIN — HYDROMORPHONE HYDROCHLORIDE 0.25 MILLIGRAM(S): 2 INJECTION INTRAMUSCULAR; INTRAVENOUS; SUBCUTANEOUS at 15:20

## 2018-02-14 RX ADMIN — Medication 5 MILLIGRAM(S): at 06:15

## 2018-02-14 RX ADMIN — SODIUM CHLORIDE 50 MILLILITER(S): 9 INJECTION, SOLUTION INTRAVENOUS at 18:11

## 2018-02-14 RX ADMIN — Medication 5 MILLIGRAM(S): at 01:40

## 2018-02-14 RX ADMIN — Medication 25 MILLIGRAM(S): at 18:11

## 2018-02-14 RX ADMIN — HYDROMORPHONE HYDROCHLORIDE 0.25 MILLIGRAM(S): 2 INJECTION INTRAMUSCULAR; INTRAVENOUS; SUBCUTANEOUS at 15:05

## 2018-02-14 RX ADMIN — Medication 100 MILLIGRAM(S): at 09:58

## 2018-02-14 RX ADMIN — SODIUM CHLORIDE 50 MILLILITER(S): 9 INJECTION, SOLUTION INTRAVENOUS at 06:38

## 2018-02-14 RX ADMIN — Medication 0.5 MILLIGRAM(S): at 02:48

## 2018-02-14 RX ADMIN — Medication 5 MILLIGRAM(S): at 10:50

## 2018-02-14 RX ADMIN — Medication 1.25 MILLIGRAM(S): at 14:12

## 2018-02-14 RX ADMIN — Medication 1.25 MILLIGRAM(S): at 00:07

## 2018-02-14 NOTE — PROGRESS NOTE ADULT - ASSESSMENT
·  Pre-op cardiac exam:  Patient has several clinical predictors of perioperative cardiac risk -- appears as optimal as is reasonably attainable for PEG placement as scheduled; can give additional doses of IV metoprolol (or IV cardizem) if HR increases perioperatievly.      ·  Chronic atrial fibrillation: Ventricular rate is fairly well-controlled - occasional episodes of tachycardia (AF + RVR); continue IV metoprolol and digoxin -- will optimize medications for rate control when able to give meds via PEG; patient not chronically anticoagulated due to prior ICH.      ·  Cardiomyopathy.  Mild/borderline LV systolic function; continue metoprolol; add ACE-I when able to take enteral meds    · Hypertension: Modestly elevated; continue IV metoprolol; will uptitrate medications after PEG.

## 2018-02-14 NOTE — PROGRESS NOTE ADULT - ASSESSMENT
86 female with    1. CVA/dementia with behavioral disturbance: appreciate neuro recs. speech and swallow reeval done - FAILED. daughter wants PEG.  NGT will likely make patient more agitated. and she would constantly pull at it requiring wrist restraints.  PEG placement ongoing.    2. chronic a.fib: meds as per cardiology. PEG placement would allow better control of HR as enteric meds would be administered which are longer acting    3. elevated BNP: lasix as needed for volume overload    4. GOC: DNR DNI. daughter wants PEG.  explained ciro-procedure risks - high risk candidate for low risk procedure - daughter understands that the risks include ciro-procedure mortality.    5. CKD III: avoid nephrotoxics. monitor intake and output.     6. heparin SQ for dvt ppx    7. poor overall prognosis.  may be hospice appropriate if any further worsening.

## 2018-02-14 NOTE — PROGRESS NOTE ADULT - ASSESSMENT
Seen for seizure/AMS/Right sided weakness/Global aphasia - Much improved.  CT Head - No acute pathology. Old Infarcts.  CVA in Left MCA area was suspected clinically, but reapeat CT Head is negative.  MRI Brain was discussed with daughter  -Pt wouldn't be able to lay still without sedation hence this test was not ordered. Use of Sedation was declined.  Did pt have David'd paralysis?  Limited CVA w/up is done with agreement of daughter.  Pt does have lacunar Infarct on left Internal capsule which is reported as old.   Carotid doppler/2D Echo will NOT be done.   Any further seizure  - would begin Keppra.  Pt is DNR/DNI.   AK  Ecoli UTI On antibiotics.  PEG today. Neurologically pt is cleared for the procedure.  D/w Dr. Peacock

## 2018-02-15 VITALS
SYSTOLIC BLOOD PRESSURE: 144 MMHG | OXYGEN SATURATION: 96 % | DIASTOLIC BLOOD PRESSURE: 78 MMHG | RESPIRATION RATE: 19 BRPM | HEART RATE: 106 BPM | TEMPERATURE: 98 F

## 2018-02-15 LAB
ALBUMIN SERPL ELPH-MCNC: 2.6 G/DL — LOW (ref 3.3–5)
ALP SERPL-CCNC: 64 U/L — SIGNIFICANT CHANGE UP (ref 30–120)
ALT FLD-CCNC: 12 U/L DA — SIGNIFICANT CHANGE UP (ref 10–60)
ANION GAP SERPL CALC-SCNC: 11 MMOL/L — SIGNIFICANT CHANGE UP (ref 5–17)
AST SERPL-CCNC: 15 U/L — SIGNIFICANT CHANGE UP (ref 10–40)
BILIRUB SERPL-MCNC: 0.5 MG/DL — SIGNIFICANT CHANGE UP (ref 0.2–1.2)
BUN SERPL-MCNC: 22 MG/DL — SIGNIFICANT CHANGE UP (ref 7–23)
CALCIUM SERPL-MCNC: 8.6 MG/DL — SIGNIFICANT CHANGE UP (ref 8.4–10.5)
CHLORIDE SERPL-SCNC: 105 MMOL/L — SIGNIFICANT CHANGE UP (ref 96–108)
CO2 SERPL-SCNC: 27 MMOL/L — SIGNIFICANT CHANGE UP (ref 22–31)
CREAT SERPL-MCNC: 0.96 MG/DL — SIGNIFICANT CHANGE UP (ref 0.5–1.3)
CULTURE RESULTS: SIGNIFICANT CHANGE UP
CULTURE RESULTS: SIGNIFICANT CHANGE UP
GLUCOSE SERPL-MCNC: 134 MG/DL — HIGH (ref 70–99)
HCT VFR BLD CALC: 33.7 % — LOW (ref 34.5–45)
HGB BLD-MCNC: 11.4 G/DL — LOW (ref 11.5–15.5)
MCHC RBC-ENTMCNC: 30 PG — SIGNIFICANT CHANGE UP (ref 27–34)
MCHC RBC-ENTMCNC: 33.7 GM/DL — SIGNIFICANT CHANGE UP (ref 32–36)
MCV RBC AUTO: 89.1 FL — SIGNIFICANT CHANGE UP (ref 80–100)
NT-PROBNP SERPL-SCNC: 6837 PG/ML — HIGH (ref 0–450)
PLATELET # BLD AUTO: 179 K/UL — SIGNIFICANT CHANGE UP (ref 150–400)
POTASSIUM SERPL-MCNC: 3.3 MMOL/L — LOW (ref 3.5–5.3)
POTASSIUM SERPL-SCNC: 3.3 MMOL/L — LOW (ref 3.5–5.3)
PROT SERPL-MCNC: 6.1 G/DL — SIGNIFICANT CHANGE UP (ref 6–8.3)
RBC # BLD: 3.78 M/UL — LOW (ref 3.8–5.2)
RBC # FLD: 12.1 % — SIGNIFICANT CHANGE UP (ref 10.3–14.5)
SODIUM SERPL-SCNC: 143 MMOL/L — SIGNIFICANT CHANGE UP (ref 135–145)
SPECIMEN SOURCE: SIGNIFICANT CHANGE UP
SPECIMEN SOURCE: SIGNIFICANT CHANGE UP
WBC # BLD: 5.8 K/UL — SIGNIFICANT CHANGE UP (ref 3.8–10.5)
WBC # FLD AUTO: 5.8 K/UL — SIGNIFICANT CHANGE UP (ref 3.8–10.5)

## 2018-02-15 PROCEDURE — 82553 CREATINE MB FRACTION: CPT

## 2018-02-15 PROCEDURE — 71045 X-RAY EXAM CHEST 1 VIEW: CPT

## 2018-02-15 PROCEDURE — 82962 GLUCOSE BLOOD TEST: CPT

## 2018-02-15 PROCEDURE — 70450 CT HEAD/BRAIN W/O DYE: CPT

## 2018-02-15 PROCEDURE — 93306 TTE W/DOPPLER COMPLETE: CPT

## 2018-02-15 PROCEDURE — 97116 GAIT TRAINING THERAPY: CPT

## 2018-02-15 PROCEDURE — 97161 PT EVAL LOW COMPLEX 20 MIN: CPT

## 2018-02-15 PROCEDURE — 99285 EMERGENCY DEPT VISIT HI MDM: CPT | Mod: 25

## 2018-02-15 PROCEDURE — 86901 BLOOD TYPING SEROLOGIC RH(D): CPT

## 2018-02-15 PROCEDURE — 71045 X-RAY EXAM CHEST 1 VIEW: CPT | Mod: 26

## 2018-02-15 PROCEDURE — 83880 ASSAY OF NATRIURETIC PEPTIDE: CPT

## 2018-02-15 PROCEDURE — 82550 ASSAY OF CK (CPK): CPT

## 2018-02-15 PROCEDURE — 86900 BLOOD TYPING SEROLOGIC ABO: CPT

## 2018-02-15 PROCEDURE — 74018 RADEX ABDOMEN 1 VIEW: CPT

## 2018-02-15 PROCEDURE — 84484 ASSAY OF TROPONIN QUANT: CPT

## 2018-02-15 PROCEDURE — 87186 SC STD MICRODIL/AGAR DIL: CPT

## 2018-02-15 PROCEDURE — 93005 ELECTROCARDIOGRAM TRACING: CPT

## 2018-02-15 PROCEDURE — 99239 HOSP IP/OBS DSCHRG MGMT >30: CPT

## 2018-02-15 PROCEDURE — 87581 M.PNEUMON DNA AMP PROBE: CPT

## 2018-02-15 PROCEDURE — 87040 BLOOD CULTURE FOR BACTERIA: CPT

## 2018-02-15 PROCEDURE — 80048 BASIC METABOLIC PNL TOTAL CA: CPT

## 2018-02-15 PROCEDURE — 97110 THERAPEUTIC EXERCISES: CPT

## 2018-02-15 PROCEDURE — 80053 COMPREHEN METABOLIC PANEL: CPT

## 2018-02-15 PROCEDURE — 85610 PROTHROMBIN TIME: CPT

## 2018-02-15 PROCEDURE — 84145 PROCALCITONIN (PCT): CPT

## 2018-02-15 PROCEDURE — 99233 SBSQ HOSP IP/OBS HIGH 50: CPT

## 2018-02-15 PROCEDURE — 85027 COMPLETE CBC AUTOMATED: CPT

## 2018-02-15 PROCEDURE — 86850 RBC ANTIBODY SCREEN: CPT

## 2018-02-15 PROCEDURE — 87486 CHLMYD PNEUM DNA AMP PROBE: CPT

## 2018-02-15 PROCEDURE — 85730 THROMBOPLASTIN TIME PARTIAL: CPT

## 2018-02-15 PROCEDURE — 87798 DETECT AGENT NOS DNA AMP: CPT

## 2018-02-15 RX ORDER — METOPROLOL TARTRATE 50 MG
25 TABLET ORAL EVERY 8 HOURS
Qty: 0 | Refills: 0 | Status: DISCONTINUED | OUTPATIENT
Start: 2018-02-15 | End: 2018-02-15

## 2018-02-15 RX ORDER — FAMOTIDINE 10 MG/ML
1 INJECTION INTRAVENOUS
Qty: 0 | Refills: 0 | COMMUNITY
Start: 2018-02-15

## 2018-02-15 RX ORDER — FAMOTIDINE 10 MG/ML
20 INJECTION INTRAVENOUS DAILY
Qty: 0 | Refills: 0 | Status: DISCONTINUED | OUTPATIENT
Start: 2018-02-15 | End: 2018-02-15

## 2018-02-15 RX ORDER — SENNA PLUS 8.6 MG/1
2 TABLET ORAL
Qty: 0 | Refills: 0 | COMMUNITY

## 2018-02-15 RX ORDER — LISINOPRIL 2.5 MG/1
1 TABLET ORAL
Qty: 0 | Refills: 0 | COMMUNITY

## 2018-02-15 RX ORDER — DIGOXIN 250 MCG
1 TABLET ORAL
Qty: 0 | Refills: 0 | COMMUNITY
Start: 2018-02-15

## 2018-02-15 RX ORDER — CARVEDILOL PHOSPHATE 80 MG/1
1 CAPSULE, EXTENDED RELEASE ORAL
Qty: 0 | Refills: 0 | COMMUNITY

## 2018-02-15 RX ORDER — RISPERIDONE 4 MG/1
1 TABLET ORAL
Qty: 0 | Refills: 0 | COMMUNITY

## 2018-02-15 RX ORDER — CEFTRIAXONE 500 MG/1
0 INJECTION, POWDER, FOR SOLUTION INTRAMUSCULAR; INTRAVENOUS
Qty: 0 | Refills: 0 | COMMUNITY

## 2018-02-15 RX ORDER — METOPROLOL TARTRATE 50 MG
1 TABLET ORAL
Qty: 0 | Refills: 0 | COMMUNITY
Start: 2018-02-15

## 2018-02-15 RX ORDER — FUROSEMIDE 40 MG
1 TABLET ORAL
Qty: 0 | Refills: 0 | COMMUNITY

## 2018-02-15 RX ORDER — ACETAMINOPHEN 500 MG
2 TABLET ORAL
Qty: 0 | Refills: 0 | COMMUNITY
Start: 2018-02-15

## 2018-02-15 RX ORDER — ACETAMINOPHEN 500 MG
0 TABLET ORAL
Qty: 0 | Refills: 0 | COMMUNITY

## 2018-02-15 RX ORDER — DOCUSATE SODIUM 100 MG
0 CAPSULE ORAL
Qty: 0 | Refills: 0 | COMMUNITY

## 2018-02-15 RX ORDER — FAMOTIDINE 10 MG/ML
1 INJECTION INTRAVENOUS
Qty: 0 | Refills: 0 | COMMUNITY

## 2018-02-15 RX ORDER — FERROUS SULFATE 325(65) MG
0 TABLET ORAL
Qty: 0 | Refills: 0 | COMMUNITY

## 2018-02-15 RX ADMIN — Medication 25 MILLIGRAM(S): at 13:01

## 2018-02-15 RX ADMIN — SODIUM CHLORIDE 50 MILLILITER(S): 9 INJECTION, SOLUTION INTRAVENOUS at 05:05

## 2018-02-15 RX ADMIN — Medication 0.12 MILLIGRAM(S): at 05:04

## 2018-02-15 RX ADMIN — FAMOTIDINE 20 MILLIGRAM(S): 10 INJECTION INTRAVENOUS at 13:01

## 2018-02-15 RX ADMIN — Medication 5 MILLIGRAM(S): at 05:04

## 2018-02-15 RX ADMIN — HEPARIN SODIUM 5000 UNIT(S): 5000 INJECTION INTRAVENOUS; SUBCUTANEOUS at 05:04

## 2018-02-15 RX ADMIN — Medication 25 MILLIGRAM(S): at 05:04

## 2018-02-15 NOTE — PROGRESS NOTE ADULT - PROBLEM SELECTOR PROBLEM 1
Multi-organ failure with heart failure
Altered mental status, unspecified altered mental status type

## 2018-02-15 NOTE — PROGRESS NOTE ADULT - ASSESSMENT
·  Pre-op cardiac exam:  Tolerated PEG placement 2/14/18; tube functioning well per RN.      ·  Chronic atrial fibrillation: Ventricular rate is labile - continue metoprolol and digoxin -- uptitrate as needed to optimize ventricular rate; patient not chronically anticoagulated due to prior ICH.      ·  Cardiomyopathy.  Mild/borderline LV systolic function; continue metoprolol and enalapril.    · Hypertension: Improving; titrate meds.

## 2018-02-15 NOTE — PROGRESS NOTE ADULT - ATTENDING COMMENTS
Advanced care planning was discussed with family. Pt is DNR/DNI.  Pain is accessed and addressed. Pt has no pain currently.  Pt was screened for signs of clinical depression. No signs of depression.  Risk of falls accessed. Fall prevention and plan of care is in place.  Pt is screened for tobacco and alcohol use. Pt doesn't smoke or drink.  Use of narcotic pain meds was discussed. Pt is advised to use narcotic meds wisely and to refrain from over using them.  Plan of care was discussed with patient family. Questions answered.  Would continue to follow.
E coli UTI- c/w Rocephin.   case d/w Pt's daughter in great detail yesterday.  prognosis is poor.
poor prognosis.

## 2018-02-15 NOTE — PROGRESS NOTE ADULT - ASSESSMENT
Seen for seizure/AMS/Right sided weakness/Global aphasia - Much improved.  CT Head - No acute pathology. Old Infarcts.  CVA in Left MCA area was suspected clinically, but reapeat CT Head is negative.  MRI Brain was discussed with daughter  -Pt wouldn't be able to lay still without sedation hence this test was not ordered. Use of Sedation was declined.  Did pt have David'd paralysis?  Limited CVA w/up is done with agreement of daughter.  Pt does have lacunar Infarct on left Internal capsule which is reported as old.   Carotid doppler/2D Echo will NOT be done.   Any further seizure  - would begin Keppra.  Pt is DNR/DNI.   OK  Ecoli UTI On antibiotics.  S/p PEG yesterday - On tube feeding.  Neurologically could be discharged.  D/w Dr. Peacock.  D/w covering nurse.

## 2018-02-15 NOTE — PROGRESS NOTE ADULT - ASSESSMENT
86 female with    1. CVA/dementia with behavioral disturbance: appreciate neuro recs. speech and swallow reeval done - FAILED.   PEG placed.    2. chronic a.fib:  d/w Dr. Lopes regarding cardiac meds    3. chronic dCHF: lasix as needed for volume overload    4. GOC: DNR DNI.     5. CKD III: avoid nephrotoxics. monitor intake and output.     6. heparin SQ for dvt ppx    7. poor overall prognosis.  may return to NH today.  spoke to daughter, RN, GI

## 2018-02-15 NOTE — PROGRESS NOTE ADULT - SUBJECTIVE AND OBJECTIVE BOX
Neurology Follow up note    MARIA DEL ROSARIO BIGGS 86y Female    HPI:  86 y.o. F found by NH having seizure episode - no h/o seizures in the past - EMS gave versed 10mg IM in the ambulance with seizure halting about 2minutes later, had already been at least 20minutes of seizure episode by then, in ED found to be febrile, information limited, pt has AMS, spoke with daughter  - [facility] states DNR/DNI/no hospitalizations/limited interventions - at this time daughter wants everything done, but not sure about invasive studies, discussed LP, will start with labs/ct head/give 1st dose abx and rediscuss with daughter.  In ED pt was found to have Right sided weakness and she was not able to speak..  NIHSS couldn't be accessed accurately.  Pt is not a tPa candidate as her symptoms onset time is NOT known.  Daughter is at bedside.  In ED pt has elevated serum Na. Volume depletion and also signs of UTI. (06 Feb 2018 12:56)    Interval History -    Patient is seen, chart was reviewed and case was discussed with the treatment team.  Pt is not in any distress.     Vital Signs Last 24 Hrs  T(C): 38.4 (09 Feb 2018 13:39), Max: 38.4 (09 Feb 2018 13:39)  T(F): 101.2 (09 Feb 2018 13:39), Max: 101.2 (09 Feb 2018 13:39)  HR: 105 (09 Feb 2018 13:40) (78 - 130)  BP: 165/85 (09 Feb 2018 13:40) (165/85 - 179/97)  BP(mean): --  RR: 24 (09 Feb 2018 13:40) (16 - 24)  SpO2: 93% (09 Feb 2018 13:40) (93% - 97%)    REVIEW OF SYSTEMS:    CONSTITUTIONAL: No fever  EYES: No eye pain,   ENMT:  No sinus or throat pain  NECK: No pain or stiffness  RESPIRATORY: No cough, No hemoptysis; No shortness of breath  CARDIOVASCULAR: No acute chest pain, palpitations,  or leg swelling  GASTROINTESTINAL: No abdominal pain. No nausea, vomiting, or hematemesis;  No melena or hematochezia.  GENITOURINARY: No  hematuria, or incontinence  MUSCULOSKELETAL: No joint swelling; No extremity pain  SKIN: No itching, rashes, or lesions   LYMPH NODES: No enlarged glands  NEUROLOGICAL: No headaches,H/O memory loss,   PSYCHIATRIC: No depression, anxiety, mood swings, or difficulty sleeping  ENDOCRINE: No heat or cold intolerance;   HEME/LYMPH: No easy bruising, or bleeding gums  Allergy/Immunology. No medication allergy. No seasonal allergies.    PHYSICAL EXAM:  Vital Signs Last 24 Hrs  T(F): 97.6 (02-06-18 @ 06:26)  HR: 94 (02-06-18 @ 07:20)  BP: 156/72 (02-06-18 @ 07:20)  RR: 18 (02-06-18 @ 07:20)    GENERAL: NAD, well-groomed, well-developed  HEAD:  Atraumatic, Normocephalic  EYES: EOMI, PERRLA, conjunctiva and sclera clear  NECK: Supple, No JVD, thyroid non-palpable    On Neurological Examination:    Mental Status - Awake.  Follows simple commands.    Speech - Able to say few words.     Cranial Nerves - Pupils 3 mm equal and reactive to light. Pt has no obvious facial asymmetry. Tongue - is in midline.    Motor Exam - Moves left sided extremities equally. Right UE 4-/5.      Sensory Exam - Pt withdraws all extremities equally on stimulation.     Gait - Couldn't be accessed.    Deep tendon Reflexes - 2 plus all over.    Neck Supple -  Yes.    MEDICATIONS    acetaminophen  Suppository 650 milliGRAM(s) Rectal every 6 hours PRN  aspirin Suppository 300 milliGRAM(s) Rectal daily  cefTRIAXone   IVPB 1 Gram(s) IV Intermittent every 24 hours  dextrose 5% + sodium chloride 0.45% with potassium chloride 40 mEq/L 1000 milliLiter(s) IV Continuous <Continuous>  heparin  Injectable 5000 Unit(s) SubCutaneous every 12 hours  metoprolol    tartrate Injectable 5 milliGRAM(s) IV Push every 6 hours    Allergies    No Known Allergies    LABS:    CBC Full  -  ( 09 Feb 2018 06:50 )  WBC Count : 7.0 K/uL  Hemoglobin : 11.3 g/dL  Hematocrit : 34.1 %  Platelet Count - Automated : 143 K/uL  Mean Cell Volume : 89.2 fl  Mean Cell Hemoglobin : 29.5 pg  Mean Cell Hemoglobin Concentration : 33.0 gm/dL  02-09    142  |  108  |  12  ----------------------------<  122<H>  3.8   |  23  |  1.07    Ca    8.2<L>      09 Feb 2018 06:50      < from: CT Head No Cont (02.08.18 @ 08:25) >    IMPRESSION:    Stable appearance of the brain since the prior exam, see above report. No   evidence of hemorrhage.      < end of copied text >      < from: CT Head No Cont (02.06.18 @ 06:27) >  IMPRESSION:   1.  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  2.  A chronic right occipital lobe infarct is stable since 04/13/2016.  A   chronic left parietal infarct is new since 04/13/2016.  3.  No CT evidence of acute intracranial hemorrhage, brain edema, brain   herniation, hydrocephalus or acute territorial infarct.      < end of copied text >
REASON FOR VISIT: AF    HPI:   86 year old woman with atrial fibrillation (not chronically anticoagulated due to subdural hematoma), non-ischemic cardiomyopathy, moderate aortic insufficiency, medication-nonadherence, MDS, CKD, admitted 2/6/18 with seizures; brain imaging reveals areas of CVA.    2/14/18:  Awaiting PEG; drowsy; confused.  2/15/18:  Tolerated PEG placement; sleepy this morning.    MEDICATIONS  (STANDING):  dextrose 5% + sodium chloride 0.45%. 1000 milliLiter(s) (10 mL/Hr) IV Continuous <Continuous>  digoxin     Tablet 0.125 milliGRAM(s) Oral daily  enalapril 5 milliGRAM(s) Oral two times a day  heparin  Injectable 5000 Unit(s) SubCutaneous every 12 hours  metoprolol     tartrate 25 milliGRAM(s) Oral every 8 hours    MEDICATIONS  (PRN):  acetaminophen   Tablet. 650 milliGRAM(s) Oral every 6 hours PRN Moderate Pain (4 - 6)  hydrALAZINE Injectable 5 milliGRAM(s) IV Push every 6 hours PRN SBP>180  LORazepam   Injectable 0.5 milliGRAM(s) IntraMuscular every 12 hours PRN Agitation    Vital Signs Last 24 Hrs  T(C): 37.1 (15 Feb 2018 05:00), Max: 37.1 (15 Feb 2018 05:00)  T(F): 98.7 (15 Feb 2018 05:00), Max: 98.7 (15 Feb 2018 05:00)  HR: 110 (15 Feb 2018 05:00) (69 - 126)  BP: 135/74 (15 Feb 2018 05:00) (132/76 - 201/75)  BP(mean): --  RR: 19 (15 Feb 2018 05:00) (16 - 21)  SpO2: 94% (15 Feb 2018 05:00) (93% - 99%)      PHYSICAL EXAM:  Constitutional: Chronically-ill appearing, asleep, drowsy  Respiratory: Breath sounds are clear bilaterally  Cardiovascular: S1 and S2, irregular rate, normal rate  Gastrointestinal: Bowel Sounds present, soft, nontender, PEG in place  Extremities: No pedal edema.   Skin: No rashes.    LABS:      CARDIAC MARKERS ( 14 Feb 2018 02:25 ) .168 ng/mL / x     / 44 U/L / x     / 1.8 ng/mL                     12.4   6.7   )-----------( 186      ( 14 Feb 2018 07:56 )             37.4     143  |  107  |  28<H>  ----------------------------<  138<H>  3.5   |  24  |  1.19    US Transthoracic Echocardiogram w/Doppler Complete (02.06.18 @ 13:47)   Mitral annular calcification with moderate mitral regurgitation  Moderate aortic regurgitation  Enlarged right and left atrium  Left ventricular ejection fraction 50%.    ECG (2/9 @ 6:53):   bp, poor R wave progression (cannot exclude old anterior infarct), nonspecific T wave abnormality -- similar to 2/6 ECG    TELE: AF
CHIEF COMPLAINT:  CVA  AFIB     HPI: 87 yo F w/ NICM (EF 44%), moderate AR, medication-nonadherent based on hx and [employer] record, Afib (not on a/c 2/2 chronic subdural hematoma), MDS, CKD , Nursing home resident with complain that patient had seizures ,  patient received medication by EMS , her seizures resolved , Patient did have fever , 101.5 F , patient was noted to CVA on CT scan , patient has chronic atrial fibrillation Not AC  , patient does have episodes of afib with rapid rate on monitor due to episodes of agitation ,       18  Patient is comfortable , confused , her heart rate is controlled on IV lopressor , patient failed swallow evaluation,  hypertensive     Pertinent info:  [id number]: EF 44%, moderate AR, severe TR, severely dilated LA index =106.  PAST MEDICAL & SURGICAL HISTORY:  RA (rheumatoid arthritis)  Constipation  Generalized anxiety disorder  Dry eye  Chronic pain  Chronic kidney disease: stage 3 moderate  Iron deficiency anemia  GERD (gastroesophageal reflux disease)  Dementia  HTN (hypertension)  Atrial fibrillation  Heart failure  Hyperlipidemia  Osteoarthritis  Subdural hematoma      Allergies    No Known Allergies    Intolerances        MEDICATIONS  (STANDING):  aspirin 325 milliGRAM(s) Oral daily  cefTRIAXone   IVPB 1 Gram(s) IV Intermittent every 24 hours  dextrose 5% + sodium chloride 0.45%. 1000 milliLiter(s) (50 mL/Hr) IV Continuous <Continuous>  heparin  Injectable 5000 Unit(s) SubCutaneous every 12 hours  metoprolol    tartrate Injectable 2.5 milliGRAM(s) IV Push every 6 hours  potassium chloride  10 mEq/100 mL IVPB 10 milliEquivalent(s) IV Intermittent every 1 hour    MEDICATIONS  (PRN):      REVIEW OF SYSTEMS:   Unable to obtain as patient is non communicative     Vital Signs Last 24 Hrs  T(C): 36.3 (2018 04:35), Max: 37.1 (2018 17:06)  T(F): 97.3 (2018 04:35), Max: 98.7 (2018 17:06)  HR: 71 (2018 04:35) (71 - 106)  BP: 166/90 (2018 04:35) (100/74 - 179/92)  BP(mean): --  RR: 20 (2018 04:35) (20 - 24)  SpO2: 93% (2018 04:35) (92% - 96%)    I&O's Summary    2018 07:01  -  2018 07:00  --------------------------------------------------------  IN: 500 mL / OUT: 0 mL / NET: 500 mL        PHYSICAL EXAM:    Constitutional: NAD, awake and confused , restlesss  HEENT: PERR, EOMI,  No oral cyananosis.  Neck:  supple,  No JVD  Respiratory: Breath sounds are clear bilaterally, No wheezing, rales or rhonchi  Cardiovascular: S1 and S2, irregular rate   Gastrointestinal: Bowel Sounds present, soft, nontender.   Extremities: No peripheral edema. No clubbing or cyanosis.  Vascular: 2+ peripheral pulses  Neurological:  awake , right UE weakness ,   Musculoskeletal: no calf tenderness.  Skin: No rashes.      LABS: All Labs Reviewed:                        11.1   4.8   )-----------( 124      ( 2018 07:29 )             33.3     02-    144  |  107  |  12  ----------------------------<  98  2.7<LL>   |  24  |  1.02    Ca    8.4      2018 07:29                    Blood Culture:         RADIOLOGY/EK18  atrail fibrillation  86 BPM possible anteroseptal infarct     Monitor   Atrial fibrillation with controlled rate , brief episodes of rapid rate possibly due to agitation       < from: US Transthoracic Echocardiogram w/Doppler Complete (18 @ 13:47) >  Measurements:  Aortic root 3.2 cm left atrium 4.4 cm right atrium 4.0 cm  Left ventricular diastolic diameter 4.5 systolic diameter 3.4  Interventricular septum 0.83, posterior 1.83  Aortic velocity 122, mitral velocity 105.  Ejection fraction 50%.    Mitral valve is thickened with annular calcification. Moderate mitral   regurgitation noted.  Aortic root is sclerotic with no aortic stenosis however moderate aortic   regurgitation is present.  Moderately severe tricuspid regurgitation noted with a PA pressure of 53.  Mild pulmonic regurgitation noted  Left atrium is enlarged  Right atrium is enlarged  Left ventricle internal dimensions appear to be normal with mild global   hypokinesis however ejection fraction is still about 50%.  Trace pericardial effusion noted.    Summary:  Mitral annular calcification with moderate mitral regurgitation  Moderate aortic regurgitation  Enlarged right and left atrium  Left ventricular ejection fraction 50%.      < end of copied text >
Chief Complaint:        INTERVAL HPI/OVERNIGHT EVENTS:    no significant events overnight reported      MEDICATIONS  (STANDING):  digoxin  Injectable 0.125 milliGRAM(s) IV Push daily  enalaprilat Injectable 1.25 milliGRAM(s) IV Push every 6 hours  heparin  Injectable 5000 Unit(s) SubCutaneous every 12 hours  metoprolol    tartrate Injectable 5 milliGRAM(s) IV Push every 4 hours    MEDICATIONS  (PRN):  acetaminophen  Suppository 650 milliGRAM(s) Rectal every 6 hours PRN For Temp greater than 38 C (100.4 F)  hydrALAZINE Injectable 5 milliGRAM(s) IV Push every 6 hours PRN SBP>180  LORazepam   Injectable 0.5 milliGRAM(s) IntraMuscular every 12 hours PRN Agitation            Vital Signs Last 24 Hrs  T(C): 37.1 (13 Feb 2018 09:25), Max: 37.1 (13 Feb 2018 09:25)  T(F): 98.7 (13 Feb 2018 09:25), Max: 98.7 (13 Feb 2018 09:25)  HR: 96 (13 Feb 2018 09:25) (90 - 106)  BP: 140/80 (13 Feb 2018 09:25) (140/80 - 186/84)  BP(mean): --  RR: 20 (13 Feb 2018 09:25) (20 - 24)  SpO2: 90% (13 Feb 2018 09:25) (90% - 97%)    Physical exam:    abd soft, non tender.         LABS:                        13.6   4.9   )-----------( 189      ( 13 Feb 2018 07:56 )             41.0     02-13    141  |  105  |  21  ----------------------------<  88  3.6   |  23  |  1.15    Ca    9.3      13 Feb 2018 07:56    TPro  7.5  /  Alb  3.0<L>  /  TBili  0.6  /  DBili  x   /  AST  18  /  ALT  21  /  AlkPhos  70  02-13    PT/INR - ( 12 Feb 2018 07:21 )   PT: 13.0 sec;   INR: 1.19 ratio         PTT - ( 12 Feb 2018 07:21 )  PTT:36.9 sec      RADIOLOGY & ADDITIONAL TESTS:
Chief Complaint:        INTERVAL HPI/OVERNIGHT EVENTS:  no significant events overnight reported  tolerating feeds via peg      MEDICATIONS  (STANDING):  dextrose 5% + sodium chloride 0.45%. 1000 milliLiter(s) (10 mL/Hr) IV Continuous <Continuous>  digoxin     Tablet 0.125 milliGRAM(s) Oral daily  enalapril 5 milliGRAM(s) Oral two times a day  heparin  Injectable 5000 Unit(s) SubCutaneous every 12 hours  metoprolol     tartrate 25 milliGRAM(s) Oral every 8 hours    MEDICATIONS  (PRN):  acetaminophen   Tablet. 650 milliGRAM(s) Oral every 6 hours PRN Moderate Pain (4 - 6)  hydrALAZINE Injectable 5 milliGRAM(s) IV Push every 6 hours PRN SBP>180  LORazepam   Injectable 0.5 milliGRAM(s) IntraMuscular every 12 hours PRN Agitation            Vital Signs Last 24 Hrs  T(C): 37.1 (15 Feb 2018 05:00), Max: 37.1 (15 Feb 2018 05:00)  T(F): 98.7 (15 Feb 2018 05:00), Max: 98.7 (15 Feb 2018 05:00)  HR: 110 (15 Feb 2018 05:00) (69 - 110)  BP: 135/74 (15 Feb 2018 05:00) (132/76 - 201/75)  BP(mean): --  RR: 19 (15 Feb 2018 05:00) (16 - 21)  SpO2: 94% (15 Feb 2018 05:00) (94% - 99%)    Physical exam:    abd soft, peg c/d/i - binder applied  cv s1s2  chest air entry   ext no edema        LABS:                        11.4   5.8   )-----------( 179      ( 15 Feb 2018 07:41 )             33.7     02-15    143  |  105  |  22  ----------------------------<  134<H>  3.3<L>   |  27  |  0.96    Ca    8.6      15 Feb 2018 07:41    TPro  6.1  /  Alb  2.6<L>  /  TBili  0.5  /  DBili  x   /  AST  15  /  ALT  12  /  AlkPhos  64  02-15          RADIOLOGY & ADDITIONAL TESTS:
Chief Complaint:        INTERVAL HPI/OVERNIGHT EVENTS:  wide complex tachy event noted    MEDICATIONS  (STANDING):  digoxin  Injectable 0.125 milliGRAM(s) IV Push daily  heparin  Injectable 5000 Unit(s) SubCutaneous every 12 hours  metoprolol    tartrate Injectable 5 milliGRAM(s) IV Push every 4 hours    MEDICATIONS  (PRN):  acetaminophen  Suppository 650 milliGRAM(s) Rectal every 6 hours PRN For Temp greater than 38 C (100.4 F)  hydrALAZINE Injectable 5 milliGRAM(s) IV Push every 6 hours PRN SBP>180  LORazepam   Injectable 0.5 milliGRAM(s) IntraMuscular every 12 hours PRN Agitation      abd      Vital Signs Last 24 Hrs  T(C): 36.3 (12 Feb 2018 10:02), Max: 36.9 (11 Feb 2018 13:59)  T(F): 97.4 (12 Feb 2018 10:02), Max: 98.5 (11 Feb 2018 13:59)  HR: 86 (12 Feb 2018 10:02) (76 - 97)  BP: 177/101 (12 Feb 2018 10:02) (149/55 - 187/110)  BP(mean): --  RR: 18 (12 Feb 2018 10:02) (16 - 19)  SpO2: 98% (12 Feb 2018 10:02) (92% - 99%)    Physical exam:  abd soft, non tender.   cv s1s2  chest air entry          LABS:                        11.8   4.9   )-----------( 150      ( 12 Feb 2018 07:21 )             35.3     02-12    141  |  105  |  12  ----------------------------<  112<H>  4.1   |  24  |  0.95    Ca    9.0      12 Feb 2018 07:21    TPro  7.1  /  Alb  2.8<L>  /  TBili  0.6  /  DBili  x   /  AST  18  /  ALT  16  /  AlkPhos  70  02-12    PT/INR - ( 12 Feb 2018 07:21 )   PT: 13.0 sec;   INR: 1.19 ratio         PTT - ( 12 Feb 2018 07:21 )  PTT:36.9 sec      RADIOLOGY & ADDITIONAL TESTS:
Date/Time Patient Seen:  		  Referring MD:   Data Reviewed	       Patient is a 86y old  Female who presents with a chief complaint of seizure. (09 Feb 2018 14:05)  in bed  seen and examined  frail  weak  verbal        Subjective/HPI     PAST MEDICAL & SURGICAL HISTORY:  RA (rheumatoid arthritis)  Constipation  Generalized anxiety disorder  Dry eye  Chronic pain  Chronic kidney disease: stage 3 moderate  Iron deficiency anemia  GERD (gastroesophageal reflux disease)  Dementia  HTN (hypertension)  Atrial fibrillation  Heart failure  Hyperlipidemia  Osteoarthritis  Subdural hematoma  Atrial fibrillation  Age related osteoporosis  HTN (hypertension)  No significant past surgical history        Medication list         MEDICATIONS  (STANDING):  digoxin  Injectable 0.125 milliGRAM(s) IV Push daily  enalaprilat Injectable 1.25 milliGRAM(s) IV Push every 6 hours  heparin  Injectable 5000 Unit(s) SubCutaneous every 12 hours  metoprolol    tartrate Injectable 5 milliGRAM(s) IV Push every 4 hours    MEDICATIONS  (PRN):  acetaminophen  Suppository 650 milliGRAM(s) Rectal every 6 hours PRN For Temp greater than 38 C (100.4 F)  hydrALAZINE Injectable 5 milliGRAM(s) IV Push every 6 hours PRN SBP>180  LORazepam   Injectable 0.5 milliGRAM(s) IntraMuscular every 12 hours PRN Agitation         Vitals log        ICU Vital Signs Last 24 Hrs  T(C): 36.3 (13 Feb 2018 05:03), Max: 36.4 (12 Feb 2018 17:00)  T(F): 97.4 (13 Feb 2018 05:03), Max: 97.6 (12 Feb 2018 17:00)  HR: 97 (13 Feb 2018 06:01) (86 - 106)  BP: 153/99 (13 Feb 2018 06:01) (143/90 - 186/84)  BP(mean): --  ABP: --  ABP(mean): --  RR: 20 (13 Feb 2018 05:03) (18 - 24)  SpO2: 93% (13 Feb 2018 05:03) (93% - 98%)           Input and Output:  I&O's Detail      Lab Data                        11.8   4.9   )-----------( 150      ( 12 Feb 2018 07:21 )             35.3     02-12    141  |  105  |  12  ----------------------------<  112<H>  4.1   |  24  |  0.95    Ca    9.0      12 Feb 2018 07:21    TPro  7.1  /  Alb  2.8<L>  /  TBili  0.6  /  DBili  x   /  AST  18  /  ALT  16  /  AlkPhos  70  02-12            Review of Systems	      Objective     Physical Examination    head at  heart s1s2  lungs dec BS  abd soft      Pertinent Lab findings & Imaging      Molina:  NO   Adequate UO     I&O's Detail           Discussed with:     Cultures:	        Radiology
Date/Time Patient Seen:  		  Referring MD:   Data Reviewed	       Patient is a 86y old  Female who presents with a chief complaint of seizure. (09 Feb 2018 14:05)  in bed  seen and examined  vs and meds reviewed        Subjective/HPI     PAST MEDICAL & SURGICAL HISTORY:  RA (rheumatoid arthritis)  Constipation  Generalized anxiety disorder  Dry eye  Chronic pain  Chronic kidney disease: stage 3 moderate  Iron deficiency anemia  GERD (gastroesophageal reflux disease)  Dementia  HTN (hypertension)  Atrial fibrillation  Heart failure  Hyperlipidemia  Osteoarthritis  Subdural hematoma  Atrial fibrillation  Age related osteoporosis  HTN (hypertension)  No significant past surgical history        Medication list         MEDICATIONS  (STANDING):  ceFAZolin   IVPB 1000 milliGRAM(s) IV Intermittent once  dextrose 5% + sodium chloride 0.45%. 1000 milliLiter(s) (50 mL/Hr) IV Continuous <Continuous>  digoxin  Injectable 0.125 milliGRAM(s) IV Push daily  enalaprilat Injectable 1.25 milliGRAM(s) IV Push every 6 hours  heparin  Injectable 5000 Unit(s) SubCutaneous every 12 hours  metoprolol    tartrate Injectable 5 milliGRAM(s) IV Push every 4 hours    MEDICATIONS  (PRN):  acetaminophen  Suppository 650 milliGRAM(s) Rectal every 6 hours PRN For Temp greater than 38 C (100.4 F)  hydrALAZINE Injectable 5 milliGRAM(s) IV Push every 6 hours PRN SBP>180  LORazepam   Injectable 0.5 milliGRAM(s) IntraMuscular every 12 hours PRN Agitation         Vitals log        ICU Vital Signs Last 24 Hrs  T(C): 36.9 (14 Feb 2018 05:49), Max: 37.1 (13 Feb 2018 09:25)  T(F): 98.4 (14 Feb 2018 05:49), Max: 98.7 (13 Feb 2018 09:25)  HR: 95 (14 Feb 2018 05:49) (81 - 139)  BP: 153/94 (14 Feb 2018 05:49) (130/81 - 190/103)  BP(mean): --  ABP: --  ABP(mean): --  RR: 20 (14 Feb 2018 05:49) (20 - 20)  SpO2: 94% (14 Feb 2018 05:49) (90% - 97%)           Input and Output:  I&O's Detail    13 Feb 2018 07:01  -  14 Feb 2018 06:51  --------------------------------------------------------  IN:    dextrose 5% + sodium chloride 0.45%.: 550 mL  Total IN: 550 mL    OUT:  Total OUT: 0 mL    Total NET: 550 mL          Lab Data                        13.6   4.9   )-----------( 189      ( 13 Feb 2018 07:56 )             41.0     02-13    141  |  105  |  21  ----------------------------<  88  3.6   |  23  |  1.15    Ca    9.3      13 Feb 2018 07:56    TPro  7.5  /  Alb  3.0<L>  /  TBili  0.6  /  DBili  x   /  AST  18  /  ALT  21  /  AlkPhos  70  02-13      CARDIAC MARKERS ( 14 Feb 2018 02:25 )  .168 ng/mL / x     / 44 U/L / x     / 1.8 ng/mL        Review of Systems	      Objective     Physical Examination    head at  heart s1s2  lungs dec BS  abd soft      Pertinent Lab findings & Imaging      Jesse:  NO   Adequate UO     I&O's Detail    13 Feb 2018 07:01  -  14 Feb 2018 06:51  --------------------------------------------------------  IN:    dextrose 5% + sodium chloride 0.45%.: 550 mL  Total IN: 550 mL    OUT:  Total OUT: 0 mL    Total NET: 550 mL               Discussed with:     Cultures:	        Radiology
Date/Time Patient Seen:  		  Referring MD:   Data Reviewed	       Patient is a 86y old  Female who presents with a chief complaint of seizure. (09 Feb 2018 14:05)  in bed  seen and examined  vs and meds reviewed        Subjective/HPI     PAST MEDICAL & SURGICAL HISTORY:  RA (rheumatoid arthritis)  Constipation  Generalized anxiety disorder  Dry eye  Chronic pain  Chronic kidney disease: stage 3 moderate  Iron deficiency anemia  GERD (gastroesophageal reflux disease)  Dementia  HTN (hypertension)  Atrial fibrillation  Heart failure  Hyperlipidemia  Osteoarthritis  Subdural hematoma  Atrial fibrillation  Age related osteoporosis  HTN (hypertension)  No significant past surgical history        Medication list         MEDICATIONS  (STANDING):  dextrose 5% + sodium chloride 0.45%. 1000 milliLiter(s) (10 mL/Hr) IV Continuous <Continuous>  digoxin     Tablet 0.125 milliGRAM(s) Oral daily  enalapril 5 milliGRAM(s) Oral two times a day  heparin  Injectable 5000 Unit(s) SubCutaneous every 12 hours  metoprolol     tartrate 25 milliGRAM(s) Oral two times a day    MEDICATIONS  (PRN):  acetaminophen   Tablet. 650 milliGRAM(s) Oral every 6 hours PRN Moderate Pain (4 - 6)  hydrALAZINE Injectable 5 milliGRAM(s) IV Push every 6 hours PRN SBP>180  LORazepam   Injectable 0.5 milliGRAM(s) IntraMuscular every 12 hours PRN Agitation         Vitals log        ICU Vital Signs Last 24 Hrs  T(C): 37.1 (15 Feb 2018 05:00), Max: 37.1 (15 Feb 2018 05:00)  T(F): 98.7 (15 Feb 2018 05:00), Max: 98.7 (15 Feb 2018 05:00)  HR: 110 (15 Feb 2018 05:00) (69 - 126)  BP: 135/74 (15 Feb 2018 05:00) (132/76 - 201/75)  BP(mean): --  ABP: --  ABP(mean): --  RR: 19 (15 Feb 2018 05:00) (16 - 21)  SpO2: 94% (15 Feb 2018 05:00) (93% - 99%)           Input and Output:  I&O's Detail    13 Feb 2018 07:01  -  14 Feb 2018 07:00  --------------------------------------------------------  IN:    dextrose 5% + sodium chloride 0.45%.: 550 mL  Total IN: 550 mL    OUT:  Total OUT: 0 mL    Total NET: 550 mL      14 Feb 2018 07:01  -  15 Feb 2018 06:47  --------------------------------------------------------  IN:    dextrose 5% + sodium chloride 0.45%.: 400 mL    Enteral Tube Flush: 75 mL    Jevity: 90 mL    lactated ringers.: 225 mL  Total IN: 790 mL    OUT:  Total OUT: 0 mL    Total NET: 790 mL          Lab Data                        11.9   7.1   )-----------( 186      ( 14 Feb 2018 22:36 )             37.1     02-14    143  |  107  |  28<H>  ----------------------------<  138<H>  3.5   |  24  |  1.19    Ca    8.7      14 Feb 2018 07:27    TPro  6.6  /  Alb  3.0<L>  /  TBili  0.6  /  DBili  x   /  AST  16  /  ALT  18  /  AlkPhos  68  02-14      CARDIAC MARKERS ( 14 Feb 2018 02:25 )  .168 ng/mL / x     / 44 U/L / x     / 1.8 ng/mL        Review of Systems	      Objective     Physical Examination    head at  heart s1s2  lungs dec BS  abd soft      Pertinent Lab findings & Imaging      Jesse:  NO   Adequate UO     I&O's Detail    13 Feb 2018 07:01  -  14 Feb 2018 07:00  --------------------------------------------------------  IN:    dextrose 5% + sodium chloride 0.45%.: 550 mL  Total IN: 550 mL    OUT:  Total OUT: 0 mL    Total NET: 550 mL      14 Feb 2018 07:01  -  15 Feb 2018 06:47  --------------------------------------------------------  IN:    dextrose 5% + sodium chloride 0.45%.: 400 mL    Enteral Tube Flush: 75 mL    Jevity: 90 mL    lactated ringers.: 225 mL  Total IN: 790 mL    OUT:  Total OUT: 0 mL    Total NET: 790 mL               Discussed with:     Cultures:	        Radiology
MARIA DEL ROSARIO BIGGS is a 86yFemale , patient examined and chart reviewed.     INTERVAL HPI/ OVERNIGHT EVENTS:   Agitated confused.    PAST MEDICAL & SURGICAL HISTORY:  RA (rheumatoid arthritis)  Constipation  Generalized anxiety disorder  Dry eye  Chronic pain  Chronic kidney disease: stage 3 moderate  Iron deficiency anemia  GERD (gastroesophageal reflux disease)  Dementia  HTN (hypertension)  Atrial fibrillation  Heart failure  Hyperlipidemia  Osteoarthritis  Subdural hematoma  Atrial fibrillation  Age related osteoporosis  HTN (hypertension)  No significant past surgical history      For details regarding the patient's social history, family history, and other miscellaneous elements, please refer the initial infectious diseases consultation and/or the admitting history and physical examination for this admission.    ROS:  Unable to obtain due to : Dementia    Current inpatient medications :    ANTIBIOTICS/RELEVANT:  cefTRIAXone   IVPB 1 Gram(s) IV Intermittent every 24 hours      aspirin 325 milliGRAM(s) Oral daily  dextrose 5% + sodium chloride 0.45%. 1000 milliLiter(s) IV Continuous <Continuous>  heparin  Injectable 5000 Unit(s) SubCutaneous every 12 hours  metoprolol    tartrate Injectable 2.5 milliGRAM(s) IV Push every 6 hours      Objective:     @ 07:01  -   @ 07:00  --------------------------------------------------------  IN: 900 mL / OUT: 0 mL / NET: 900 mL      T(C): 36.7 (18 @ 14:00), Max: 37 (18 @ 00:05)  HR: 83 (18 @ 14:00) (59 - 99)  BP: 168/89 (18 @ 14:00) (145/85 - 184/92)  RR: 24 (18 @ 14:00) (18 - 24)  SpO2: 92% (18 @ 14:00) (92% - 97%)  Wt(kg): --      Physical Exam:  General: Agitated confused  Eyes: sclera anicteric, pupils equal and reactive to light  ENMT: buccal mucosa moist, pharynx not injected  Neck: supple, trachea midline  Lungs: clear, no wheeze/rhonchi  Cardiovascular: regular rate and rhythm, S1 S2  Abdomen: soft, nontender, no organomegaly present, bowel sounds normal  Neurological: Dementia  Skin: no increased ecchymosis/petechiae/purpura  Lymph Nodes: no palpable cervical/supraclavicular lymph nodes enlargements  Extremities: no cyanosis/clubbing/edema    LABS:                          10.4   5.9   )-----------( 111      ( 2018 07:42 )             32.4       02-07    143  |  109<H>  |  14  ----------------------------<  111<H>  3.5   |  24  |  0.92    Ca    8.2<L>      2018 07:42    TPro  6.5  /  Alb  3.1<L>  /  TBili  0.4  /  DBili  x   /  AST  17  /  ALT  24  /  AlkPhos  57  02-06      PT/INR - ( 2018 05:47 )   PT: 11.8 sec;   INR: 1.08 ratio         PTT - ( 2018 05:47 )  PTT:30.9 sec  Urinalysis Basic - ( 2018 05:56 )    Color: Yellow / Appearance: Clear / S.015 / pH: x  Gluc: x / Ketone: Negative  / Bili: Negative / Urobili: Negative mg/dL   Blood: x / Protein: 100 mg/dL / Nitrite: Positive   Leuk Esterase: Small / RBC: 0-2 /HPF / WBC 11-25   Sq Epi: x / Non Sq Epi: Few / Bacteria: Moderate    MICROBIOLOGY:    Culture - Blood (collected 2018 12:10)  Source: .Blood Blood-Peripheral  Preliminary Report (2018 13:01):    No growth to date.    Culture - Blood (collected 2018 12:10)  Source: .Blood Blood-Peripheral  Preliminary Report (2018 13:01):    No growth to date.    Culture - Urine (collected 2018 12:08)  Source: .Urine Catheterized  Preliminary Report (2018 11:14):    >100,000 CFU/ml Escherichia coli    RADIOLOGY & ADDITIONAL STUDIES:  EXAM:  CT BRAIN                          PROCEDURE DATE:  2018      INTERPRETATION:  EXAM: CT HEAD WITHOUT INTRAVENOUS CONTRAST    CLINICAL INFORMATION: First-time seizure.    TECHNIQUE: Noncontrast axial CT images were acquired through the head.   2016.    COMPARISON STUDY: None    FINDINGS:   A chronic, right frontal parietal low-attenuation subdural collection   measuring up to approximately 2 cm in thickness (5:31) is grossly stable   from 2016.  A chronic leftfrontal parietal low-attenuation subdural collection   measuring up to approximately 1.2 cm in thickness is grossly stable since   2016.  Mild mass effect on the bilateral frontal-probably convexities is stable   since 2016  A chronic right occipital lobe infarct is stable since 2016.  There is a chronic appearing infarct in the posterior inferior left   parietal lobe, new since 2016.    There is no CT evidence of acute intracranial hemorrhage, brain edema,   midline shift, central herniation, hydrocephalus or acute territorial   infarct.     There is mild generalized cerebral volume loss and mild periventricular   white matter hypoattenuation compatible chronic microvascular ischemic   disease.  There are scattered lacunar infarcts in the basal ganglia and   anterior limbs of internal capsules bilaterally.      The visualized paranasal sinuses are clear.    The mastoid air cells and middle ear cavities are grossly clear.    The calvarium, skull base, visualized facial bones and regional soft   tissues are grossly unremarkable.    IMPRESSION:   1.  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  2.  A chronic right occipital lobe infarct is stable since 2016.  A   chronic left parietal infarct is new since 2016.  3.  No CT evidence of acute intracranial hemorrhage, brain edema, brain   herniation, hydrocephalus or acute territorial infarct.      EXAM:  XR CHEST PORTABLE URGENT 1V                          PROCEDURE DATE:  2018      INTERPRETATION:  Clinical information: Fever, seizure.    Portable study, 5:48 AM.    Comparison exam dated 2016.    Cardiac monitor leadspresent. Marked cardiac enlargement. No lobar   consolidation vascular congestion or effusion. Hilar regions, mediastinal   contours intact. Aorta shows atherosclerotic changes. Advanced   degenerative changes both glenohumeral joints.    IMPRESSION: See above report    Assessment :   86 y.o. F Dementia found by NH having seizure episode sec to poss Left MCA CVA   Seen by neurology  Fever could be reactive to seizure  Rule out UTI    Plan :   Cont Rocephin for now  Fu cultures  Trend temps  Aspiration precautions    Continue with present regiment.  Appropriate use of antibiotics and adverse effects reviewed.      I have discussed the above plan of care with patient's family in detail. They expressed understanding of the  treatment plan . Risks, benefits and alternatives discussed in detail. I have asked if they have any questions or concerns and appropriately addressed them to the best of my ability .    > 35 minutes were spent in direct patient care reviewing notes, medications ,labs data/ imaging , discussion with multidisciplinary team.    Thank you for allowing me to participate in care of your patient .    Amira Baker MD  Infectious Disease  [phone]
MARIA DEL ROSARIO BIGGS is a 86yFemale , patient examined and chart reviewed.    INTERVAL HPI/ OVERNIGHT EVENTS:   Afebrile. Failed swallow study.    PAST MEDICAL & SURGICAL HISTORY:  RA (rheumatoid arthritis)  Constipation  Generalized anxiety disorder  Dry eye  Chronic pain  Chronic kidney disease: stage 3 moderate  Iron deficiency anemia  GERD (gastroesophageal reflux disease)  Dementia  HTN (hypertension)  Atrial fibrillation  Heart failure  Hyperlipidemia  Osteoarthritis  Subdural hematoma  Atrial fibrillation  Age related osteoporosis  HTN (hypertension)  No significant past surgical history      For details regarding the patient's social history, family history, and other miscellaneous elements, please refer the initial infectious diseases consultation and/or the admitting history and physical examination for this admission.    ROS:  Unable to obtain due to : Dementia confusion      Current inpatient medications :    ANTIBIOTICS/RELEVANT:  cefTRIAXone   IVPB 1 Gram(s) IV Intermittent every 24 hours      aspirin 325 milliGRAM(s) Oral daily  dextrose 5% + sodium chloride 0.45% with potassium chloride 40 mEq/L 1000 milliLiter(s) IV Continuous <Continuous>  heparin  Injectable 5000 Unit(s) SubCutaneous every 12 hours  metoprolol    tartrate Injectable 5 milliGRAM(s) IV Push every 6 hours      Objective:    02-08 @ 07:01  -  02-09 @ 07:00  --------------------------------------------------------  IN: 1350 mL / OUT: 0 mL / NET: 1350 mL      T(C): 37.4 (02-09-18 @ 09:15), Max: 37.4 (02-09-18 @ 09:15)  HR: 128 (02-09-18 @ 09:51) (78 - 128)  BP: 178/90 (02-09-18 @ 09:51) (165/92 - 187/98)  RR: 20 (02-09-18 @ 09:15) (16 - 20)  SpO2: 96% (02-09-18 @ 09:15) (95% - 97%)  Wt(kg): --      Physical Exam:  General: No acute distress Confused  Eyes: sclera anicteric, pupils equal and reactive to light  ENMT: buccal mucosa moist, pharynx not injected  Neck: supple, trachea midline  Lungs: Decreased, no wheeze/rhonchi  Cardiovascular: regular rate and rhythm, S1 S2  Abdomen: soft, nontender, no organomegaly present, bowel sounds normal  Neurological: Dementia  Skin: no increased ecchymosis/petechiae/purpura  Lymph Nodes: no palpable cervical/supraclavicular lymph nodes enlargements  Extremities: no cyanosis/clubbing/edema      LABS:                          11.3   7.0   )-----------( 143      ( 09 Feb 2018 06:50 )             34.1       02-09    142  |  108  |  12  ----------------------------<  122<H>  3.8   |  23  |  1.07    Ca    8.2<L>      09 Feb 2018 06:50    Assessment :  86 y.o. F Dementia found by NH having seizure episode sec to poss Left MCA CVA   Seen by neurology  Fever could be reactive to seizure  Poss UTI    Plan :   Rocephin x 3 more days  Prognosis poor  Aspiration precautions    D/w Dr DEANDRE Trevino    Continue with present regiment.  Appropriate use of antibiotics and adverse effects reviewed.    > 35 minutes were spent in direct patient care reviewing notes, medications ,labs data/ imaging , discussion with multidisciplinary team.    Thank you for allowing me to participate in care of your patient .    Amira Baker MD  Infectious Disease  [phone]
MARIA DEL ROSARIO BIGGS is a 86yFemale , patient examined and chart reviewed.    INTERVAL HPI/ OVERNIGHT EVENTS:   Confused. Afebrile.    PAST MEDICAL & SURGICAL HISTORY:  RA (rheumatoid arthritis)  Constipation  Generalized anxiety disorder  Dry eye  Chronic pain  Chronic kidney disease: stage 3 moderate  Iron deficiency anemia  GERD (gastroesophageal reflux disease)  Dementia  HTN (hypertension)  Atrial fibrillation  Heart failure  Hyperlipidemia  Osteoarthritis  Subdural hematoma  Atrial fibrillation  Age related osteoporosis  HTN (hypertension)  No significant past surgical history      For details regarding the patient's social history, family history, and other miscellaneous elements, please refer the initial infectious diseases consultation and/or the admitting history and physical examination for this admission.    ROS:  Unable to obtain due to : Dementia confused    NKDA    Current inpatient medications :    ANTIBIOTICS/RELEVANT:  cefTRIAXone   IVPB 1 Gram(s) IV Intermittent every 24 hours      aspirin 325 milliGRAM(s) Oral daily  dextrose 5% + sodium chloride 0.45%. 1000 milliLiter(s) IV Continuous <Continuous>  heparin  Injectable 5000 Unit(s) SubCutaneous every 12 hours  metoprolol    tartrate Injectable 2.5 milliGRAM(s) IV Push every 6 hours      Objective:    02-07 @ 07:01  -  02-08 @ 07:00  --------------------------------------------------------  IN: 500 mL / OUT: 0 mL / NET: 500 mL      T(C): 36.7 (02-08-18 @ 14:16), Max: 37.1 (02-07-18 @ 17:06)  HR: 98 (02-08-18 @ 14:16) (71 - 106)  BP: 187/98 (02-08-18 @ 14:16) (100/74 - 187/98)  RR: 19 (02-08-18 @ 14:16) (18 - 20)  SpO2: 97% (02-08-18 @ 14:16) (93% - 98%)  Wt(kg): --      Physical Exam:  General: Confused  Eyes: sclera anicteric, pupils equal and reactive to light  ENMT: buccal mucosa moist, pharynx not injected  Neck: supple, trachea midline  Lungs: Decreased, no wheeze/rhonchi  Cardiovascular: regular rate and rhythm, S1 S2  Abdomen: soft, nontender, no organomegaly present, bowel sounds normal  Neurological: Confused   Skin: no increased ecchymosis/petechiae/purpura  Lymph Nodes: no palpable cervical/supraclavicular lymph nodes enlargements  Extremities: no cyanosis/clubbing/edema      LABS:                          11.1   4.8   )-----------( 124      ( 08 Feb 2018 07:29 )             33.3       02-08    144  |  107  |  12  ----------------------------<  98  2.7<LL>   |  24  |  1.02    Ca    8.4      08 Feb 2018 07:29    MICROBIOLOGY:    Culture - Blood (collected 06 Feb 2018 12:10)  Source: .Blood Blood-Peripheral  Preliminary Report (07 Feb 2018 13:01):    No growth to date.    Culture - Blood (collected 06 Feb 2018 12:10)  Source: .Blood Blood-Peripheral  Preliminary Report (07 Feb 2018 13:01):    No growth to date.    Culture - Urine (collected 06 Feb 2018 12:08)  Source: .Urine Catheterized  Final Report (08 Feb 2018 10:00):    >100,000 CFU/ml Escherichia coli  Organism: Escherichia coli (08 Feb 2018 10:00)  Organism: Escherichia coli (08 Feb 2018 10:00)      -  Amikacin: S <=8      -  Ampicillin: S 4      -  Ampicillin/Sulbactam: S <=4/2      -  Aztreonam: S <=4      -  Cefazolin: S <=2      -  Cefepime: S <=2      -  Cefoxitin: S 8      -  Ceftazidime: S <=1      -  Ceftriaxone: S <=1      -  Ciprofloxacin: R >2      -  Ertapenem: S <=0.5      -  Gentamicin: S <=1      -  Imipenem: S <=1      -  Levofloxacin: R >4      -  Meropenem: S <=1      -  Nitrofurantoin: S <=32      -  Piperacillin/Tazobactam: S <=8      -  Tobramycin: S <=2      -  Trimethoprim/Sulfamethoxazole: S <=0.5/9.5      Method Type: [facility]    RADIOLOGY & ADDITIONAL STUDIES:  EXAM:  CT BRAIN                            PROCEDURE DATE:  02/08/2018          INTERPRETATION:  Clinical information: Right-sided weakness, aphasia   comparison exam dated February 6, 2018    Axial images obtained, coronal and sagittal images computer reformatted.    Bilateral subdural hygromas are present, the appearance is unchanged   since the prior study. An area of diminished attenuation is present in   the right occipital lobe, old infarct. Similar finding, left parietal   region unchanged since prior exam.  Area of diminished attenuation present right internal capsule,  lacuna   infarct.  Area of diminished attenuation left internal capsule, lacuna infarct.     No evidence of hemorrhage. No evidence of  midline shift. No unusual   calcifications present. Atrophic changes are identified.    Calvarium intact. No fluid levels in visualized paranasal sinuses.    IMPRESSION:    Stable appearance of the brain since the prior exam, see above report. No   evidence of hemorrhage.    Assessment :  86 y.o. F Dementia found by NH having seizure episode sec to poss Left MCA CVA   Seen by neurology  Fever could be reactive to seizure  Rule out UTI    Plan :   Dc Rocephin   Change to po Ceftin x 4 more days  Aspiration precautions    Continue with present regiment.  Appropriate use of antibiotics and adverse effects reviewed.      I have discussed the above plan of care with patient's family in detail. They expressed understanding of the  treatment plan . Risks, benefits and alternatives discussed in detail.   I have asked if they have any questions or concerns and appropriately addressed them to the best of my ability .    > 35 minutes were spent in direct patient care reviewing notes, medications ,labs data/ imaging , discussion with multidisciplinary team.    Thank you for allowing me to participate in care of your patient .    Amira Baker MD  Infectious Disease  [phone]
Neurology Follow up note    MARIA DEL ROSARIO BIGGS 86y Female    HPI:  86 y.o. F found by NH having seizure episode - no h/o seizures in the past - EMS gave versed 10mg IM in the ambulance with seizure halting about 2minutes later, had already been at least 20minutes of seizure episode by then, in ED found to be febrile, information limited, pt has AMS, spoke with daughter  - MOLST states DNR/DNI/no hospitalizations/limited interventions - at this time daughter wants everything done, but not sure about invasive studies, discussed LP, will start with labs/ct head/give 1st dose abx and rediscuss with daughter.  In ED pt was found to have Right sided weakness and she was not able to speak..  NIHSS couldn't be accessed accurately.  Pt is not a tPa candidate as her symptoms onset time is NOT known.  Daughter is at bedside.  In ED pt has elevated serum Na. Volume depletion and also signs of UTI. (06 Feb 2018 12:56)      Interval History -    Patient is seen, chart was reviewed and case was discussed with the treatment team.  Daughter is at bedside.    Vital Signs Last 24 Hrs  T(C): 36.3 (12 Feb 2018 13:35), Max: 36.9 (11 Feb 2018 21:06)  T(F): 97.3 (12 Feb 2018 13:35), Max: 98.5 (11 Feb 2018 21:06)  HR: 106 (12 Feb 2018 13:35) (76 - 106)  BP: 178/90 (12 Feb 2018 13:35) (149/55 - 187/110)  BP(mean): --  RR: 24 (12 Feb 2018 13:35) (16 - 24)  SpO2: 95% (12 Feb 2018 13:35) (92% - 99%)      REVIEW OF SYSTEMS:    CONSTITUTIONAL: No fever  EYES: No eye pain,   ENMT:  No sinus or throat pain  NECK: No pain or stiffness  RESPIRATORY: No cough, No hemoptysis; No shortness of breath  CARDIOVASCULAR: No acute chest pain, palpitations,  or leg swelling  GASTROINTESTINAL: No abdominal pain. No nausea, vomiting, or hematemesis;  No melena or hematochezia.  GENITOURINARY: No  hematuria, or incontinence  MUSCULOSKELETAL: No joint swelling; No extremity pain  SKIN: No itching, rashes, or lesions   LYMPH NODES: No enlarged glands  NEUROLOGICAL: No headaches, H/O memory loss,   PSYCHIATRIC: No depression, anxiety, mood swings, or difficulty sleeping  ENDOCRINE: No heat or cold intolerance;   HEME/LYMPH: No easy bruising, or bleeding gums  Allergy/Immunology. No medication allergy. No seasonal allergies.    PHYSICAL EXAM:  Vital Signs Last 24 Hrs  T(F): 97.6 (02-06-18 @ 06:26)  HR: 94 (02-06-18 @ 07:20)  BP: 156/72 (02-06-18 @ 07:20)  RR: 18 (02-06-18 @ 07:20)    GENERAL: NAD, well-groomed, well-developed  HEAD:  Atraumatic, Normocephalic  EYES: EOMI, PERRLA, conjunctiva and sclera clear  NECK: Supple, No JVD, thyroid non-palpable    On Neurological Examination:    Mental Status - Opens eyes. Follows commands.    Speech - Able to say few words.     Cranial Nerves - Pupils 3 mm equal and reactive to light. Pt has no obvious facial asymmetry. Tongue - is in midline.    Motor Exam - Moves left sided extremities equally. Right UE 3+/5.  Moves right LE on stimulation. Exact strength couldn't be accessed.    Sensory Exam - Pt withdraws all extremities equally on stimulation.     Gait - Couldn't be accessed.    Deep tendon Reflexes - 2 plus all over.    Neck Supple -  Yes.    MEDICATIONS    acetaminophen  Suppository 650 milliGRAM(s) Rectal every 6 hours PRN  digoxin  Injectable 0.125 milliGRAM(s) IV Push daily  enalaprilat IVPB 1.25 milliGRAM(s) IV Intermittent every 6 hours  heparin  Injectable 5000 Unit(s) SubCutaneous every 12 hours  hydrALAZINE Injectable 5 milliGRAM(s) IV Push every 6 hours PRN  LORazepam   Injectable 0.5 milliGRAM(s) IntraMuscular every 12 hours PRN  metoprolol    tartrate Injectable 5 milliGRAM(s) IV Push every 4 hours      Allergies    No Known Allergies    LABS:    CBC Full  -  ( 12 Feb 2018 07:21 )  WBC Count : 4.9 K/uL  Hemoglobin : 11.8 g/dL  Hematocrit : 35.3 %  Platelet Count - Automated : 150 K/uL  Mean Cell Volume : 92.0 fl  Mean Cell Hemoglobin : 30.6 pg  Mean Cell Hemoglobin Concentration : 33.3 gm/dL  Auto Neutrophil # : 3.4 K/uL  Auto Lymphocyte # : 0.9 K/uL  Auto Monocyte # : 0.4 K/uL  Auto Eosinophil # : 0.1 K/uL  Auto Basophil # : 0.1 K/uL  Auto Neutrophil % : 69.4 %  Auto Lymphocyte % : 19.1 %  Auto Monocyte % : 08.3 %  Auto Eosinophil % : 1.3 %  Auto Basophil % : 1.9 %      02-12    141  |  105  |  12  ----------------------------<  112<H>  4.1   |  24  |  0.95    Ca    9.0      12 Feb 2018 07:21    TPro  7.1  /  Alb  2.8<L>  /  TBili  0.6  /  DBili  x   /  AST  18  /  ALT  16  /  AlkPhos  70  02-12      < from: CT Head No Cont (02.08.18 @ 08:25) >    IMPRESSION:    Stable appearance of the brain since the prior exam, see above report. No   evidence of hemorrhage.      < end of copied text >      < from: CT Head No Cont (02.06.18 @ 06:27) >  IMPRESSION:   1.  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  2.  A chronic right occipital lobe infarct is stable since 04/13/2016.  A   chronic left parietal infarct is new since 04/13/2016.  3.  No CT evidence of acute intracranial hemorrhage, brain edema, brain   herniation, hydrocephalus or acute territorial infarct.      < end of copied text >
Neurology Follow up note    MARIA DEL ROSARIO BIGGS 86y Female    HPI:  86 y.o. F found by NH having seizure episode - no h/o seizures in the past - EMS gave versed 10mg IM in the ambulance with seizure halting about 2minutes later, had already been at least 20minutes of seizure episode by then, in ED found to be febrile, information limited, pt has AMS, spoke with daughter  - MOLST states DNR/DNI/no hospitalizations/limited interventions - at this time daughter wants everything done, but not sure about invasive studies, discussed LP, will start with labs/ct head/give 1st dose abx and rediscuss with daughter.  In ED pt was found to have Right sided weakness and she was not able to speak..  NIHSS couldn't be accessed accurately.  Pt is not a tPa candidate as her symptoms onset time is NOT known.  Daughter is at bedside.  In ED pt has elevated serum Na. Volume depletion and also signs of UTI. (06 Feb 2018 12:56)      Interval History -    Patient is seen, chart was reviewed and case was discussed with the treatment team.  More awake. Asked me "how are you doing?"  S/p PEG yesterday on tube feeding.    Vital Signs Last 24 Hrs  T(C): 36.9 (15 Feb 2018 10:30), Max: 37.1 (15 Feb 2018 05:00)  T(F): 98.4 (15 Feb 2018 10:30), Max: 98.7 (15 Feb 2018 05:00)  HR: 103 (15 Feb 2018 10:30) (69 - 110)  BP: 139/76 (15 Feb 2018 10:30) (132/76 - 201/75)  BP(mean): --  RR: 19 (15 Feb 2018 10:30) (16 - 21)  SpO2: 97% (15 Feb 2018 10:30) (94% - 99%)    REVIEW OF SYSTEMS:    CONSTITUTIONAL: No fever  EYES: No eye pain,   ENMT:  No sinus or throat pain  NECK: No pain or stiffness  RESPIRATORY: No cough, No hemoptysis; No shortness of breath  CARDIOVASCULAR: No acute chest pain, palpitations,  or leg swelling  GASTROINTESTINAL: Dysphagia. S/p PEG yesterday.  GENITOURINARY: No  hematuria, or incontinence  MUSCULOSKELETAL: No joint swelling; No extremity pain  SKIN: No itching, rashes, or lesions   LYMPH NODES: No enlarged glands  NEUROLOGICAL: No headaches, H/O memory loss,   PSYCHIATRIC: No depression, anxiety, mood swings, or difficulty sleeping  ENDOCRINE: No heat or cold intolerance;   HEME/LYMPH: No easy bruising, or bleeding gums  Allergy/Immunology. No medication allergy. No seasonal allergies.    GENERAL: NAD, well-groomed, well-developed  HEAD:  Atraumatic, Normocephalic  EYES: EOMI, PERRLA, conjunctiva and sclera clear  NECK: Supple, No JVD, thyroid non-palpable    On Neurological Examination:    Mental Status - Opens eyes. Follows simple commands.    Speech - Able to say few words.     Cranial Nerves - Pupils 3 mm equal and reactive to light. Pt has no obvious facial asymmetry. Tongue - is in midline.    Motor Exam - Moves left sided extremities equally. Right UE 3+/5.  Moves right LE on stimulation. Exact strength couldn't be accessed.    Sensory Exam - Pt withdraws all extremities equally on stimulation.     Gait - Couldn't be accessed.    Deep tendon Reflexes - 2 plus all over.    Neck Supple -  Yes.    MEDICATIONS    acetaminophen   Tablet. 650 milliGRAM(s) Oral every 6 hours PRN  dextrose 5% + sodium chloride 0.45%. 1000 milliLiter(s) IV Continuous <Continuous>  digoxin     Tablet 0.125 milliGRAM(s) Oral daily  enalapril 5 milliGRAM(s) Oral two times a day  famotidine    Tablet 20 milliGRAM(s) Oral daily  heparin  Injectable 5000 Unit(s) SubCutaneous every 12 hours  hydrALAZINE Injectable 5 milliGRAM(s) IV Push every 6 hours PRN  LORazepam   Injectable 0.5 milliGRAM(s) IntraMuscular every 12 hours PRN  metoprolol     tartrate 25 milliGRAM(s) Oral every 8 hours    Allergies    No Known Allergies    LABS:    CBC Full  -  ( 15 Feb 2018 07:41 )  WBC Count : 5.8 K/uL  Hemoglobin : 11.4 g/dL  Hematocrit : 33.7 %  Platelet Count - Automated : 179 K/uL  Mean Cell Volume : 89.1 fl  Mean Cell Hemoglobin : 30.0 pg  Mean Cell Hemoglobin Concentration : 33.7 gm/dL    02-15    143  |  105  |  22  ----------------------------<  134<H>  3.3<L>   |  27  |  0.96    Ca    8.6      15 Feb 2018 07:41    TPro  6.1  /  Alb  2.6<L>  /  TBili  0.5  /  DBili  x   /  AST  15  /  ALT  12  /  AlkPhos  64  02-15    RADIOLOGY    < from: CT Head No Cont (02.08.18 @ 08:25) >  Stable appearance of the brain since the prior exam, see above report. No   evidence of hemorrhage.    < end of copied text >    < from: CT Head No Cont (02.06.18 @ 06:27) >  IMPRESSION:   1.  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  2.  A chronic right occipital lobe infarct is stable since 04/13/2016.  A   chronic left parietal infarct is new since 04/13/2016.  3.  No CT evidence of acute intracranial hemorrhage, brain edema, brain   herniation, hydrocephalus or acute territorial infarct.      < end of copied text >
Neurology Follow up note    MARIA DEL ROSARIO BIGGS 86y Female    HPI:  86 y.o. F found by NH having seizure episode - no h/o seizures in the past - EMS gave versed 10mg IM in the ambulance with seizure halting about 2minutes later, had already been at least 20minutes of seizure episode by then, in ED found to be febrile, information limited, pt has AMS, spoke with daughter  - MOLST states DNR/DNI/no hospitalizations/limited interventions - at this time daughter wants everything done, but not sure about invasive studies, discussed LP, will start with labs/ct head/give 1st dose abx and rediscuss with daughter.  In ED pt was found to have Right sided weakness and she was not able to speak..  NIHSS couldn't be accessed accurately.  Pt is not a tPa candidate as her symptoms onset time is NOT known.  Daughter is at bedside.  In ED pt has elevated serum Na. Volume depletion and also signs of UTI. (06 Feb 2018 12:56)      Interval History -    Patient is seen, chart was reviewed and case was discussed with the treatment team.  Prior events noted. HR went into 16s  - received cardizem dose.  No seizure reported.    Vital Signs Last 24 Hrs  T(C): 36.1 (11 Feb 2018 09:20), Max: 36.9 (11 Feb 2018 01:13)  T(F): 97 (11 Feb 2018 09:20), Max: 98.5 (11 Feb 2018 01:13)  HR: 72 (11 Feb 2018 12:09) (72 - 170)  BP: 151/87 (11 Feb 2018 12:09) (115/84 - 180/96)  BP(mean): --  RR: 18 (11 Feb 2018 09:20) (18 - 20)  SpO2: 97% (11 Feb 2018 09:20) (94% - 99%)    REVIEW OF SYSTEMS:    CONSTITUTIONAL: No fever  EYES: No eye pain,   ENMT:  No sinus or throat pain  NECK: No pain or stiffness  RESPIRATORY: No cough, No hemoptysis; No shortness of breath  CARDIOVASCULAR: No acute chest pain, palpitations,  or leg swelling  GASTROINTESTINAL: No abdominal pain. No nausea, vomiting, or hematemesis;  No melena or hematochezia.  GENITOURINARY: No  hematuria, or incontinence  MUSCULOSKELETAL: No joint swelling; No extremity pain  SKIN: No itching, rashes, or lesions   LYMPH NODES: No enlarged glands  NEUROLOGICAL: No headaches, H/O memory loss,   PSYCHIATRIC: No depression, anxiety, mood swings, or difficulty sleeping  ENDOCRINE: No heat or cold intolerance;   HEME/LYMPH: No easy bruising, or bleeding gums  Allergy/Immunology. No medication allergy. No seasonal allergies.    PHYSICAL EXAM:  Vital Signs Last 24 Hrs  T(F): 97.6 (02-06-18 @ 06:26)  HR: 94 (02-06-18 @ 07:20)  BP: 156/72 (02-06-18 @ 07:20)  RR: 18 (02-06-18 @ 07:20)    GENERAL: NAD, well-groomed, well-developed  HEAD:  Atraumatic, Normocephalic  EYES: EOMI, PERRLA, conjunctiva and sclera clear  NECK: Supple, No JVD, thyroid non-palpable    On Neurological Examination:    Mental Status - Opens eyes. tries to follows commands.    Speech - Able to say few words.     Cranial Nerves - Pupils 3 mm equal and reactive to light. Pt has no obvious facial asymmetry. Tongue - is in midline.    Motor Exam - Moves left sided extremities equally. Right UE 3+/5.  Moves right LE on stimulation. Exact strength couldn't be accessed.    Sensory Exam - Pt withdraws all extremities equally on stimulation.     Gait - Couldn't be accessed.    Deep tendon Reflexes - 2 plus all over.    Neck Supple -  Yes.    MEDICATIONS    acetaminophen  Suppository 650 milliGRAM(s) Rectal every 6 hours PRN  aspirin Suppository 300 milliGRAM(s) Rectal daily  dextrose 5% + sodium chloride 0.45% with potassium chloride 40 mEq/L 1000 milliLiter(s) IV Continuous <Continuous>  diltiazem Infusion 5 mG/Hr IV Continuous <Continuous>  haloperidol    Injectable 0.5 milliGRAM(s) IntraMuscular every 8 hours PRN  heparin  Injectable 5000 Unit(s) SubCutaneous every 12 hours  LORazepam   Injectable 0.25 milliGRAM(s) IntraMuscular two times a day PRN  metoprolol    tartrate Injectable 5 milliGRAM(s) IV Push every 6 hours      Allergies    No Known Allergies    LABS:    CBC Full  -  ( 10 Feb 2018 07:27 )  WBC Count : 5.0 K/uL  Hemoglobin : 11.0 g/dL  Hematocrit : 33.1 %  Platelet Count - Automated : 136 K/uL  Mean Cell Volume : 92.1 fl  Mean Cell Hemoglobin : 30.5 pg  Mean Cell Hemoglobin Concentration : 33.1 gm/dL  02-10    142  |  110<H>  |  14  ----------------------------<  109<H>  3.7   |  23  |  1.15    Ca    8.2<L>      10 Feb 2018 07:24      < from: CT Head No Cont (02.08.18 @ 08:25) >    IMPRESSION:    Stable appearance of the brain since the prior exam, see above report. No   evidence of hemorrhage.      < end of copied text >      < from: CT Head No Cont (02.06.18 @ 06:27) >  IMPRESSION:   1.  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  2.  A chronic right occipital lobe infarct is stable since 04/13/2016.  A   chronic left parietal infarct is new since 04/13/2016.  3.  No CT evidence of acute intracranial hemorrhage, brain edema, brain   herniation, hydrocephalus or acute territorial infarct.      < end of copied text >
Neurology Follow up note    MARIA DEL ROSARIO BIGGS 86y Female    HPI:  86 y.o. F found by NH having seizure episode - no h/o seizures in the past - EMS gave versed 10mg IM in the ambulance with seizure halting about 2minutes later, had already been at least 20minutes of seizure episode by then, in ED found to be febrile, information limited, pt has AMS, spoke with daughter  - MOLST states DNR/DNI/no hospitalizations/limited interventions - at this time daughter wants everything done, but not sure about invasive studies, discussed LP, will start with labs/ct head/give 1st dose abx and rediscuss with daughter.  In ED pt was found to have Right sided weakness and she was not able to speak..  NIHSS couldn't be accessed accurately.  Pt is not a tPa candidate as her symptoms onset time is NOT known.  Daughter is at bedside.  In ED pt has elevated serum Na. Volume depletion and also signs of UTI. (06 Feb 2018 12:56)    Interval History -    Patient is seen, chart was reviewed and case was discussed with the treatment team.  Agitation reported at times.    Vital Signs Last 24 Hrs  T(C): 36.2 (10 Feb 2018 09:54), Max: 38.4 (09 Feb 2018 13:39)  T(F): 97.2 (10 Feb 2018 09:54), Max: 101.2 (09 Feb 2018 13:39)  HR: 71 (10 Feb 2018 09:54) (71 - 112)  BP: 155/99 (10 Feb 2018 09:54) (131/76 - 165/85)  BP(mean): --  RR: 16 (10 Feb 2018 09:54) (16 - 24)  SpO2: 97% (10 Feb 2018 09:54) (93% - 97%)      REVIEW OF SYSTEMS:    CONSTITUTIONAL: No fever  EYES: No eye pain,   ENMT:  No sinus or throat pain  NECK: No pain or stiffness  RESPIRATORY: No cough, No hemoptysis; No shortness of breath  CARDIOVASCULAR: No acute chest pain, palpitations,  or leg swelling  GASTROINTESTINAL: No abdominal pain. No nausea, vomiting, or hematemesis;  No melena or hematochezia.  GENITOURINARY: No  hematuria, or incontinence  MUSCULOSKELETAL: No joint swelling; No extremity pain  SKIN: No itching, rashes, or lesions   LYMPH NODES: No enlarged glands  NEUROLOGICAL: No headaches, H/O memory loss,   PSYCHIATRIC: No depression, anxiety, mood swings, or difficulty sleeping  ENDOCRINE: No heat or cold intolerance;   HEME/LYMPH: No easy bruising, or bleeding gums  Allergy/Immunology. No medication allergy. No seasonal allergies.  GENERAL: NAD, well-groomed, well-developed  HEAD:  Atraumatic, Normocephalic  EYES: EOMI, PERRLA, conjunctiva and sclera clear  NECK: Supple, No JVD, thyroid non-palpable    On Neurological Examination:    Mental Status - Awake.  Follows simple commands.    Speech - Able to speak.    Cranial Nerves - Pupils 3 mm equal and reactive to light. Pt has no obvious facial asymmetry. Tongue - is in midline.    Motor Exam - Moves left sided extremities equally. Right UE 4-/5.      Sensory Exam - Pt withdraws all extremities equally on stimulation.     Gait - Couldn't be accessed.    Deep tendon Reflexes - 2 plus all over.    Neck Supple -  Ye    MEDICATIONS    acetaminophen  Suppository 650 milliGRAM(s) Rectal every 6 hours PRN  aspirin Suppository 300 milliGRAM(s) Rectal daily  cefTRIAXone   IVPB 1 Gram(s) IV Intermittent every 24 hours  dextrose 5% + sodium chloride 0.45% with potassium chloride 40 mEq/L 1000 milliLiter(s) IV Continuous <Continuous>  haloperidol    Injectable 0.5 milliGRAM(s) IntraMuscular every 8 hours PRN  heparin  Injectable 5000 Unit(s) SubCutaneous every 12 hours  metoprolol    tartrate Injectable 5 milliGRAM(s) IV Push every 6 hours    Allergies    No Known Allergies      LABS:    CBC Full  -  ( 10 Feb 2018 07:27 )  WBC Count : 5.0 K/uL  Hemoglobin : 11.0 g/dL  Hematocrit : 33.1 %  Platelet Count - Automated : 136 K/uL  Mean Cell Volume : 92.1 fl  Mean Cell Hemoglobin : 30.5 pg  Mean Cell Hemoglobin Concentration : 33.1 gm/dL    02-10    142  |  110<H>  |  14  ----------------------------<  109<H>  3.7   |  23  |  1.15    Ca    8.2<L>      10 Feb 2018 07:24    RADIOLOGY      < from: CT Head No Cont (02.08.18 @ 08:25) >    IMPRESSION:    Stable appearance of the brain since the prior exam, see above report. No   evidence of hemorrhage.      < end of copied text >      < from: CT Head No Cont (02.06.18 @ 06:27) >  IMPRESSION:   1.  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  2.  A chronic right occipital lobe infarct is stable since 04/13/2016.  A   chronic left parietal infarct is new since 04/13/2016.  3.  No CT evidence of acute intracranial hemorrhage, brain edema, brain   herniation, hydrocephalus or acute territorial infarct.      < end of copied text >
Neurology Follow up note    MARIA DEL ROSARIO BIGGS 86y Female    HPI:  86 y.o. F found by NH having seizure episode - no h/o seizures in the past - EMS gave versed 10mg IM in the ambulance with seizure halting about 2minutes later, had already been at least 20minutes of seizure episode by then, in ED found to be febrile, information limited, pt has AMS, spoke with daughter  - MOLST states DNR/DNI/no hospitalizations/limited interventions - at this time daughter wants everything done, but not sure about invasive studies, discussed LP, will start with labs/ct head/give 1st dose abx and rediscuss with daughter.  In ED pt was found to have Right sided weakness and she was not able to speak..  NIHSS couldn't be accessed accurately.  Pt is not a tPa candidate as her symptoms onset time is NOT known.  Daughter is at bedside.  In ED pt has elevated serum Na. Volume depletion and also signs of UTI. (06 Feb 2018 12:56)    Interval History -    Patient is seen, chart was reviewed and case was discussed with the treatment team.  Pt is not in any distress.     Vital Signs Last 24 Hrs  T(C): 36.7 (08 Feb 2018 09:25), Max: 37.1 (07 Feb 2018 17:06)  T(F): 98 (08 Feb 2018 09:25), Max: 98.7 (07 Feb 2018 17:06)  HR: 78 (08 Feb 2018 09:25) (71 - 106)  BP: 129/77 (08 Feb 2018 09:25) (100/74 - 179/92)  BP(mean): --  RR: 18 (08 Feb 2018 09:25) (18 - 24)  SpO2: 98% (08 Feb 2018 09:25) (92% - 98%)      REVIEW OF SYSTEMS:    CONSTITUTIONAL: No fever  EYES: No eye pain,   ENMT:  No sinus or throat pain  NECK: No pain or stiffness  RESPIRATORY: No cough, No hemoptysis; No shortness of breath  CARDIOVASCULAR: No acute chest pain, palpitations,  or leg swelling  GASTROINTESTINAL: No abdominal pain. No nausea, vomiting, or hematemesis;  No melena or hematochezia.  GENITOURINARY: No  hematuria, or incontinence  MUSCULOSKELETAL: No joint swelling; No extremity pain  SKIN: No itching, rashes, or lesions   LYMPH NODES: No enlarged glands  NEUROLOGICAL: No headaches,H/O memory loss,   PSYCHIATRIC: No depression, anxiety, mood swings, or difficulty sleeping  ENDOCRINE: No heat or cold intolerance;   HEME/LYMPH: No easy bruising, or bleeding gums  Allergy/Immunology. No medication allergy. No seasonal allergies.    PHYSICAL EXAM:  Vital Signs Last 24 Hrs  T(F): 97.6 (02-06-18 @ 06:26)  HR: 94 (02-06-18 @ 07:20)  BP: 156/72 (02-06-18 @ 07:20)  RR: 18 (02-06-18 @ 07:20)    GENERAL: NAD, well-groomed, well-developed  HEAD:  Atraumatic, Normocephalic  EYES: EOMI, PERRLA, conjunctiva and sclera clear  NECK: Supple, No JVD, thyroid non-palpable    On Neurological Examination:    Mental Status - Awake.  Follows simple commands.    Speech - Said how are you. Able to say her first name.    Cranial Nerves - Pupils 3 mm equal and reactive to light. Pt has no obvious facial asymmetry. Tongue - is in midline.    Motor Exam - Moves left sided extremities equally. Right UE 4/5.      Sensory Exam - Pt withdraws all extremities equally on stimulation.     Gait - Couldn't be accessed.    Deep tendon Reflexes - 2 plus all over.    Neck Supple -  Yes.    MEDICATIONS    aspirin 325 milliGRAM(s) Oral daily  cefTRIAXone   IVPB 1 Gram(s) IV Intermittent every 24 hours  dextrose 5% + sodium chloride 0.45%. 1000 milliLiter(s) IV Continuous <Continuous>  heparin  Injectable 5000 Unit(s) SubCutaneous every 12 hours  metoprolol    tartrate Injectable 2.5 milliGRAM(s) IV Push every 6 hours  potassium chloride  10 mEq/100 mL IVPB 10 milliEquivalent(s) IV Intermittent every 1 hour    Allergies    No Known Allergies    LABS:    CBC Full  -  ( 08 Feb 2018 07:29 )  WBC Count : 4.8 K/uL  Hemoglobin : 11.1 g/dL  Hematocrit : 33.3 %  Platelet Count - Automated : 124 K/uL  Mean Cell Volume : 91.1 fl  Mean Cell Hemoglobin : 30.5 pg  Mean Cell Hemoglobin Concentration : 33.5 gm/dL    02-08    144  |  107  |  12  ----------------------------<  98  2.7<LL>   |  24  |  1.02    Ca    8.4      08 Feb 2018 07:29      RADIOLOGY & ADDITIONAL STUDIES:      < from: CT Head No Cont (02.08.18 @ 08:25) >    IMPRESSION:    Stable appearance of the brain since the prior exam, see above report. No   evidence of hemorrhage.      < end of copied text >      < from: CT Head No Cont (02.06.18 @ 06:27) >  IMPRESSION:   1.  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  2.  A chronic right occipital lobe infarct is stable since 04/13/2016.  A   chronic left parietal infarct is new since 04/13/2016.  3.  No CT evidence of acute intracranial hemorrhage, brain edema, brain   herniation, hydrocephalus or acute territorial infarct.      < end of copied text >
Neurology Follow up note    MARIA DEL ROSARIO BIGGS 86y Female    HPI:  86 y.o. F found by NH having seizure episode - no h/o seizures in the past - EMS gave versed 10mg IM in the ambulance with seizure halting about 2minutes later, had already been at least 20minutes of seizure episode by then, in ED found to be febrile, information limited, pt has AMS, spoke with daughter  - MOLST states DNR/DNI/no hospitalizations/limited interventions - at this time daughter wants everything done, but not sure about invasive studies, discussed LP, will start with labs/ct head/give 1st dose abx and rediscuss with daughter.  In ED pt was found to have Right sided weakness and she was not able to speak..  NIHSS couldn't be accessed accurately.  Pt is not a tPa candidate as her symptoms onset time is NOT known.  Daughter is at bedside.  In ED pt has elevated serum Na. Volume depletion and also signs of UTI. (2018 12:56)    Interval History -    Patient is seen, chart was reviewed and case was discussed with the treatment team.  Pt is not in any distress.   Daughter is at bedside.    Vital Signs Last 24 Hrs  T(C): 36.9 (2018 09:08), Max: 37 (2018 00:05)  T(F): 98.4 (2018 09:08), Max: 98.6 (2018 00:05)  HR: 78 (2018 09:08) (59 - 99)  BP: 145/85 (2018 09:08) (143/85 - 184/92)  BP(mean): --  RR: 19 (2018 09:08) (18 - 19)  SpO2: 94% (2018 09:08) (94% - 97%)      REVIEW OF SYSTEMS:    CONSTITUTIONAL: No fever  EYES: No eye pain,   ENMT:  No sinus or throat pain  NECK: No pain or stiffness  RESPIRATORY: No cough, No hemoptysis; No shortness of breath  CARDIOVASCULAR: No acute chest pain, palpitations,  or leg swelling  GASTROINTESTINAL: No abdominal pain. No nausea, vomiting, or hematemesis;  No melena or hematochezia.  GENITOURINARY: No  hematuria, or incontinence  MUSCULOSKELETAL: No joint swelling; No extremity pain  SKIN: No itching, rashes, or lesions   LYMPH NODES: No enlarged glands  NEUROLOGICAL: No headaches,H/O memory loss,   PSYCHIATRIC: No depression, anxiety, mood swings, or difficulty sleeping  ENDOCRINE: No heat or cold intolerance;   HEME/LYMPH: No easy bruising, or bleeding gums  Allergy/Immunology. No medication allergy. No seasonal allergies.    PHYSICAL EXAM:  Vital Signs Last 24 Hrs  T(F): 97.6 (18 @ 06:26)  HR: 94 (18 @ 07:20)  BP: 156/72 (18 @ 07:20)  RR: 18 (18 @ 07:20)    GENERAL: NAD, well-groomed, well-developed  HEAD:  Atraumatic, Normocephalic  EYES: EOMI, PERRLA, conjunctiva and sclera clear  NECK: Supple, No JVD, thyroid non-palpable    On Neurological Examination:    Mental Status - Awake. Non verbal currently. Follows simple commands inconsistently.    Speech - Non verbal currently.    Cranial Nerves - Pupils 3 mm equal and reactive to light. Pt has no obvious facial asymmetry. Tongue - is in midline.    Motor Exam - Moves left sided extremities equally. Right UE 0/5.      Sensory Exam - Pt withdraws all extremities equally on stimulation.     Gait - Couldn't be accessed.    Deep tendon Reflexes - 2 plus all over.    Neck Supple -  Yes.          MEDICATIONS    aspirin 325 milliGRAM(s) Oral daily  cefTRIAXone   IVPB 1 Gram(s) IV Intermittent every 24 hours  dextrose 5% + sodium chloride 0.45%. 1000 milliLiter(s) IV Continuous <Continuous>  heparin  Injectable 5000 Unit(s) SubCutaneous every 12 hours  metoprolol    tartrate Injectable 2.5 milliGRAM(s) IV Push every 6 hours    Allergies    No Known Allergies    LABS:    CBC Full  -  ( 2018 07:42 )  WBC Count : 5.9 K/uL  Hemoglobin : 10.4 g/dL  Hematocrit : 32.4 %  Platelet Count - Automated : 111 K/uL  Mean Cell Volume : 91.0 fl  Mean Cell Hemoglobin : 29.3 pg  Mean Cell Hemoglobin Concentration : 32.2 gm/dL    Urinalysis Basic - ( 2018 05:56 )    Color: Yellow / Appearance: Clear / S.015 / pH: x  Gluc: x / Ketone: Negative  / Bili: Negative / Urobili: Negative mg/dL   Blood: x / Protein: 100 mg/dL / Nitrite: Positive   Leuk Esterase: Small / RBC: 0-2 /HPF / WBC 11-25   Sq Epi: x / Non Sq Epi: Few / Bacteria: Moderate        143  |  109<H>  |  14  ----------------------------<  111<H>  3.5   |  24  |  0.92    Ca    8.2<L>      2018 07:42    TPro  6.5  /  Alb  3.1<L>  /  TBili  0.4  /  DBili  x   /  AST  17  /  ALT  24  /  AlkPhos  57        RADIOLOGY & ADDITIONAL STUDIES:    < from: CT Head No Cont (18 @ 06:27) >  IMPRESSION:   1.  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  2.  A chronic right occipital lobe infarct is stable since 2016.  A   chronic left parietal infarct is new since 2016.  3.  No CT evidence of acute intracranial hemorrhage, brain edema, brain   herniation, hydrocephalus or acute territorial infarct.      < end of copied text >
PCP:    REQUESTING PHYSICIAN:      CHIEF COMPLAINT:  Patient is a 86y old  Female who presents with a chief complaint of seizure. (06 Feb 2018 12:56)      HPI:  86 y.o. F found by NH having seizure episode - no h/o seizures in the past - EMS gave versed 10mg IM in the ambulance with seizure halting about 2minutes later, had already been at least 20minutes of seizure episode by then, in ED found to be febrile, information limited, pt has AMS, spoke with daughter  - MOLST states DNR/DNI/no hospitalizations/limited interventions - at this time daughter wants everything done, but not sure about invasive studies, discussed LP, will start with labs/ct head/give 1st dose abx and rediscuss with daughter.  In ED pt was found to have Right sided weakness and she was not able to speak..  NIHSS couldn't be accessed accurately.  Pt is not a tPa candidate as her symptoms onset time is NOT known.  Daughter is at bedside.  In ED pt has elevated serum Na. Volume depletion and also signs of UTI. (06 Feb 2018 12:56)    2/7/18 still aphasic  ?rt hemiparesis-cva      PAST MEDICAL & SURGICAL HISTORY:  RA (rheumatoid arthritis)  Constipation  Generalized anxiety disorder  Dry eye  Chronic pain  Chronic kidney disease: stage 3 moderate  Iron deficiency anemia  GERD (gastroesophageal reflux disease)  Dementia  HTN (hypertension)  Atrial fibrillation  Heart failure  Hyperlipidemia  Osteoarthritis  Subdural hematoma  Atrial fibrillation  Age related osteoporosis  HTN (hypertension)  No significant past surgical history      Allergies    No Known Allergies    Intolerances        SOCIAL HISTORY:    FAMILY HISTORY:  No pertinent family history in first degree relatives      MEDICATIONS:    MEDICATIONS  (STANDING):  aspirin 325 milliGRAM(s) Oral daily  cefTRIAXone   IVPB 1 Gram(s) IV Intermittent every 24 hours  dextrose 5% + sodium chloride 0.45%. 1000 milliLiter(s) (50 mL/Hr) IV Continuous <Continuous>  heparin  Injectable 5000 Unit(s) SubCutaneous every 12 hours  metoprolol    tartrate Injectable 2.5 milliGRAM(s) IV Push every 6 hours    MEDICATIONS  (PRN):      REVIEW OF SYSTEMS:    CONSTITUTIONAL: No weakness, fevers or chills  EYES/ENT: No visual changes;  No vertigo or throat pain   NECK: No pain or stiffness  RESPIRATORY: No cough, wheezing, hemoptysis; No shortness of breath  CARDIOVASCULAR: No chest pain or palpitations  GASTROINTESTINAL: No abdominal or epigastric pain. No nausea, vomiting, or hematemesis; No diarrhea or constipation. No melena or hematochezia.  GENITOURINARY: No dysuria, frequency or hematuria  NEUROLOGICAL: No numbness or weakness  SKIN: No itching, burning, rashes, or lesions   All other review of systems is negative unless indicated above    Vital Signs Last 24 Hrs  T(C): 36.9 (07 Feb 2018 09:08), Max: 37 (07 Feb 2018 00:05)  T(F): 98.4 (07 Feb 2018 09:08), Max: 98.6 (07 Feb 2018 00:05)  HR: 78 (07 Feb 2018 09:08) (59 - 99)  BP: 145/85 (07 Feb 2018 09:08) (143/85 - 184/92)  BP(mean): --  RR: 19 (07 Feb 2018 09:08) (18 - 19)  SpO2: 94% (07 Feb 2018 09:08) (94% - 97%)    I&O's Summary    06 Feb 2018 07:01  -  07 Feb 2018 07:00  --------------------------------------------------------  IN: 900 mL / OUT: 0 mL / NET: 900 mL        PHYSICAL EXAM:    Constitutional: NAD, awake and alert, well-developed  HEENT: PERR, EOMI,  No oral cyananosis.  Neck:  supple,  No JVD  Respiratory: Breath sounds are clear bilaterally, No wheezing, rales or rhonchi  Cardiovascular: S1 and S2, regular rate and rhythm, no Murmurs, gallops or rubs  Gastrointestinal: Bowel Sounds present, soft, nontender.   Extremities: No peripheral edema. No clubbing or cyanosis.  Vascular: 2+ peripheral pulses  Neurological: A/O x 3, no focal deficits  Musculoskeletal: no calf tenderness.  Skin: No rashes.      LABS: All Labs Reviewed:                        10.4   5.9   )-----------( 111      ( 07 Feb 2018 07:42 )             32.4                         10.0   4.8   )-----------( 138      ( 06 Feb 2018 05:53 )             30.2     07 Feb 2018 07:42    143    |  109    |  14     ----------------------------<  111    3.5     |  24     |  0.92   06 Feb 2018 05:47    147    |  111    |  28     ----------------------------<  130    4.1     |  27     |  1.20     Ca    8.2        07 Feb 2018 07:42  Ca    8.3        06 Feb 2018 05:47    TPro  6.5    /  Alb  3.1    /  TBili  0.4    /  DBili  x      /  AST  17     /  ALT  24     /  AlkPhos  57     06 Feb 2018 05:47    PT/INR - ( 06 Feb 2018 05:47 )   PT: 11.8 sec;   INR: 1.08 ratio         PTT - ( 06 Feb 2018 05:47 )  PTT:30.9 sec      Blood Culture: Organism --  Gram Stain Blood -- Gram Stain --  Specimen Source .Urine Catheterized  Culture-Blood --        2 d ECHO:  Measurements:  Aortic root 3.2 cm left atrium 4.4 cm right atrium 4.0 cm  Left ventricular diastolic diameter 4.5 systolic diameter 3.4  Interventricular septum 0.83, posterior 1.83  Aortic velocity 122, mitral velocity 105.  Ejection fraction 50%.    Mitral valve is thickened with annular calcification. Moderate mitral   regurgitation noted.  Aortic root is sclerotic with no aortic stenosis however moderate aortic   regurgitation is present.  Moderately severe tricuspid regurgitation noted with a PA pressure of 53.  Mild pulmonic regurgitation noted  Left atrium is enlarged  Right atrium is enlarged  Left ventricle internal dimensions appear to be normal with mild global   hypokinesis however ejection fraction is still about 50%.  Trace pericardial effusion noted.    Summary:  Mitral annular calcification with moderate mitral regurgitation  Moderate aortic regurgitation  Enlarged right and left atrium  Left ventricular ejection fraction 50%.    ekg   ATRIAL FIBRILLATION  RIGHTWARD AXIS  LOW VOLTAGE QRS  CANNOT RULE OUT ANTERIOR INFARCT , AGE UNDETERMINED  T WAVE ABNORMALITY, CONSIDER LATERAL ISCHEMIA OR DIGITALIS EFFECT
Patient is a 86y old  Female who presents with a chief complaint of seizure. (06 Feb 2018 12:56)      INTERVAL HPI/OVERNIGHT EVENTS:  Pt is seen and examined.  mental status is improving. Pt is following verbal command.      Pain Location & Control:     MEDICATIONS  (STANDING):  aspirin 325 milliGRAM(s) Oral daily  cefTRIAXone   IVPB 1 Gram(s) IV Intermittent every 24 hours  dextrose 5% + sodium chloride 0.45% with potassium chloride 40 mEq/L 1000 milliLiter(s) (50 mL/Hr) IV Continuous <Continuous>  heparin  Injectable 5000 Unit(s) SubCutaneous every 12 hours  metoprolol    tartrate Injectable 5 milliGRAM(s) IV Push every 6 hours    MEDICATIONS  (PRN):      Allergies    No Known Allergies    Intolerances            Vital Signs Last 24 Hrs  T(C): 37.4 (09 Feb 2018 09:15), Max: 37.4 (09 Feb 2018 09:15)  T(F): 99.4 (09 Feb 2018 09:15), Max: 99.4 (09 Feb 2018 09:15)  HR: 128 (09 Feb 2018 09:51) (78 - 128)  BP: 178/90 (09 Feb 2018 09:51) (165/92 - 187/98)  BP(mean): --  RR: 20 (09 Feb 2018 09:15) (16 - 20)  SpO2: 96% (09 Feb 2018 09:15) (95% - 97%)        LABS:                        11.3   7.0   )-----------( 143      ( 09 Feb 2018 06:50 )             34.1     09 Feb 2018 06:50    142    |  108    |  12     ----------------------------<  122    3.8     |  23     |  1.07     Ca    8.2        09 Feb 2018 06:50            CARDIAC MARKERS ( 09 Feb 2018 06:59 )  .027 ng/mL / x     / x     / x     / x          Cultures  Culture Results:   No growth to date. (02-06 @ 12:10)  Culture Results:   No growth to date. (02-06 @ 12:10)  Culture Results:   >100,000 CFU/ml Escherichia coli (02-06 @ 12:08)        Culture - Blood (collected 02-06-18 @ 12:10)  Source: .Blood Blood-Peripheral  Preliminary Report (02-07-18 @ 13:01):    No growth to date.    Culture - Blood (collected 02-06-18 @ 12:10)  Source: .Blood Blood-Peripheral  Preliminary Report (02-07-18 @ 13:01):    No growth to date.    Culture - Urine (collected 02-06-18 @ 12:08)  Source: .Urine Catheterized  Final Report (02-08-18 @ 10:00):    >100,000 CFU/ml Escherichia coli  Organism: Escherichia coli (02-08-18 @ 10:00)  Organism: Escherichia coli (02-08-18 @ 10:00)      -  Amikacin: S <=8      -  Ampicillin: S 4      -  Ampicillin/Sulbactam: S <=4/2      -  Aztreonam: S <=4      -  Cefazolin: S <=2      -  Cefepime: S <=2      -  Cefoxitin: S 8      -  Ceftazidime: S <=1      -  Ceftriaxone: S <=1      -  Ciprofloxacin: R >2      -  Ertapenem: S <=0.5      -  Gentamicin: S <=1      -  Imipenem: S <=1      -  Levofloxacin: R >4      -  Meropenem: S <=1      -  Nitrofurantoin: S <=32      -  Piperacillin/Tazobactam: S <=8      -  Tobramycin: S <=2      -  Trimethoprim/Sulfamethoxazole: S <=0.5/9.5      Method Type: JEAN        RADIOLOGY & ADDITIONAL TESTS:    Imaging Personally Reviewed:  [ ] YES  [ ] NO    Consultant(s) Notes Reviewed:  [ ] YES  [ ] NO    Care Discussed with Consultants/Other Providers [x ] YES  [ ] NO
Patient is a 86y old  Female who presents with a chief complaint of seizure. (06 Feb 2018 12:56)      INTERVAL HPI/OVERNIGHT EVENTS:  Pt is seen and examined.  unable to obtain any history as pt is AMS, lethargic.    Pain Location & Control:     MEDICATIONS  (STANDING):  aspirin 325 milliGRAM(s) Oral daily  cefTRIAXone   IVPB 1 Gram(s) IV Intermittent every 24 hours  dextrose 5% + sodium chloride 0.45%. 1000 milliLiter(s) (50 mL/Hr) IV Continuous <Continuous>  heparin  Injectable 5000 Unit(s) SubCutaneous every 12 hours  metoprolol    tartrate Injectable 2.5 milliGRAM(s) IV Push every 6 hours  potassium chloride  10 mEq/100 mL IVPB 10 milliEquivalent(s) IV Intermittent every 1 hour    MEDICATIONS  (PRN):      Allergies    No Known Allergies    Intolerances            Vital Signs Last 24 Hrs  T(C): 36.7 (08 Feb 2018 09:25), Max: 37.1 (07 Feb 2018 17:06)  T(F): 98 (08 Feb 2018 09:25), Max: 98.7 (07 Feb 2018 17:06)  HR: 78 (08 Feb 2018 09:25) (71 - 106)  BP: 129/77 (08 Feb 2018 09:25) (100/74 - 179/92)  BP(mean): --  RR: 18 (08 Feb 2018 09:25) (18 - 24)  SpO2: 98% (08 Feb 2018 09:25) (92% - 98%)        LABS:                        11.1   4.8   )-----------( 124      ( 08 Feb 2018 07:29 )             33.3     08 Feb 2018 07:29    144    |  107    |  12     ----------------------------<  98     2.7     |  24     |  1.02     Ca    8.4        08 Feb 2018 07:29              Cultures  Culture Results:   No growth to date. (02-06 @ 12:10)  Culture Results:   No growth to date. (02-06 @ 12:10)  Culture Results:   >100,000 CFU/ml Escherichia coli (02-06 @ 12:08)        Culture - Blood (collected 02-06-18 @ 12:10)  Source: .Blood Blood-Peripheral  Preliminary Report (02-07-18 @ 13:01):    No growth to date.    Culture - Blood (collected 02-06-18 @ 12:10)  Source: .Blood Blood-Peripheral  Preliminary Report (02-07-18 @ 13:01):    No growth to date.    Culture - Urine (collected 02-06-18 @ 12:08)  Source: .Urine Catheterized  Final Report (02-08-18 @ 10:00):    >100,000 CFU/ml Escherichia coli  Organism: Escherichia coli (02-08-18 @ 10:00)  Organism: Escherichia coli (02-08-18 @ 10:00)      -  Amikacin: S <=8      -  Ampicillin: S 4      -  Ampicillin/Sulbactam: S <=4/2      -  Aztreonam: S <=4      -  Cefazolin: S <=2      -  Cefepime: S <=2      -  Cefoxitin: S 8      -  Ceftazidime: S <=1      -  Ceftriaxone: S <=1      -  Ciprofloxacin: R >2      -  Ertapenem: S <=0.5      -  Gentamicin: S <=1      -  Imipenem: S <=1      -  Levofloxacin: R >4      -  Meropenem: S <=1      -  Nitrofurantoin: S <=32      -  Piperacillin/Tazobactam: S <=8      -  Tobramycin: S <=2      -  Trimethoprim/Sulfamethoxazole: S <=0.5/9.5      Method Type: JEAN        RADIOLOGY & ADDITIONAL TESTS:    Imaging Personally Reviewed:  [ ] YES  [ ] NO    Consultant(s) Notes Reviewed:  [ ] YES  [ ] NO    Care Discussed with Consultants/Other Providers [ ] YES  [ ] NO
Patient is a 86y old  Female who presents with a chief complaint of seizure. (09 Feb 2018 14:05)      INTERVAL HPI/OVERNIGHT EVENTS:  Pt is seen and examined.  History is not obtained due to dementia, AMS.    Pain Location & Control:     MEDICATIONS  (STANDING):  dextrose 5% + sodium chloride 0.45%. 1000 milliLiter(s) (50 mL/Hr) IV Continuous <Continuous>  digoxin  Injectable 0.125 milliGRAM(s) IV Push daily  enalaprilat Injectable 1.25 milliGRAM(s) IV Push every 6 hours  heparin  Injectable 5000 Unit(s) SubCutaneous every 12 hours  metoprolol    tartrate Injectable 5 milliGRAM(s) IV Push every 4 hours    MEDICATIONS  (PRN):  acetaminophen  Suppository 650 milliGRAM(s) Rectal every 6 hours PRN For Temp greater than 38 C (100.4 F)  hydrALAZINE Injectable 5 milliGRAM(s) IV Push every 6 hours PRN SBP>180  LORazepam   Injectable 0.5 milliGRAM(s) IntraMuscular every 12 hours PRN Agitation      Allergies    No Known Allergies    Intolerances            Vital Signs Last 24 Hrs  T(C): 37.1 (13 Feb 2018 09:25), Max: 37.1 (13 Feb 2018 09:25)  T(F): 98.7 (13 Feb 2018 09:25), Max: 98.7 (13 Feb 2018 09:25)  HR: 96 (13 Feb 2018 09:25) (90 - 106)  BP: 140/80 (13 Feb 2018 09:25) (140/80 - 186/84)  BP(mean): --  RR: 20 (13 Feb 2018 09:25) (20 - 24)  SpO2: 90% (13 Feb 2018 09:25) (90% - 97%)        LABS:                        13.6   4.9   )-----------( 189      ( 13 Feb 2018 07:56 )             41.0     13 Feb 2018 07:56    141    |  105    |  21     ----------------------------<  88     3.6     |  23     |  1.15     Ca    9.3        13 Feb 2018 07:56    TPro  7.5    /  Alb  3.0    /  TBili  0.6    /  DBili  x      /  AST  18     /  ALT  21     /  AlkPhos  70     13 Feb 2018 07:56    PT/INR - ( 12 Feb 2018 07:21 )   PT: 13.0 sec;   INR: 1.19 ratio         PTT - ( 12 Feb 2018 07:21 )  PTT:36.9 sec    CAPILLARY BLOOD GLUCOSE            Cultures  Culture Results:   No growth to date. (02-09 @ 23:08)  Culture Results:   No growth to date. (02-09 @ 23:08)  Culture Results:   No growth at 5 days. (02-06 @ 12:10)  Culture Results:   No growth at 5 days. (02-06 @ 12:10)        Culture - Blood (collected 02-09-18 @ 23:08)  Source: .Blood Blood  Preliminary Report (02-11-18 @ 01:02):    No growth to date.    Culture - Blood (collected 02-09-18 @ 23:08)  Source: .Blood Blood  Preliminary Report (02-11-18 @ 01:02):    No growth to date.    Culture - Blood (collected 02-06-18 @ 12:10)  Source: .Blood Blood-Peripheral  Final Report (02-11-18 @ 13:00):    No growth at 5 days.    Culture - Blood (collected 02-06-18 @ 12:10)  Source: .Blood Blood-Peripheral  Final Report (02-11-18 @ 13:00):    No growth at 5 days.        RADIOLOGY & ADDITIONAL TESTS:    Imaging Personally Reviewed:  [ ] YES  [ ] NO    Consultant(s) Notes Reviewed:  [ ] YES  [ ] NO    Care Discussed with Consultants/Other Providers [x ] YES  [ ] NO
Patient is a 86y old  Female who presents with a chief complaint of seizure. (09 Feb 2018 14:05)      INTERVAL History of Present Illness/OVERNIGHT EVENTS: A.fib with RVR - started on cardizem drip earlier.  Subsequently, HR down to 40s briefly after given Metoprolol iv push for HR 120s despite being on cardizem drip at 10mg/hr.  no symptoms reported, but pt unreliable due to advanced age and strokes.    MEDICATIONS  (STANDING):  aspirin Suppository 300 milliGRAM(s) Rectal daily  dextrose 5% + sodium chloride 0.45% with potassium chloride 40 mEq/L 1000 milliLiter(s) (50 mL/Hr) IV Continuous <Continuous>  diltiazem Infusion 5 mG/Hr (5 mL/Hr) IV Continuous <Continuous>  heparin  Injectable 5000 Unit(s) SubCutaneous every 12 hours  metoprolol    tartrate Injectable 5 milliGRAM(s) IV Push every 6 hours    MEDICATIONS  (PRN):  acetaminophen  Suppository 650 milliGRAM(s) Rectal every 6 hours PRN For Temp greater than 38 C (100.4 F)  haloperidol    Injectable 0.5 milliGRAM(s) IntraMuscular every 8 hours PRN Agitation  LORazepam   Injectable 0.25 milliGRAM(s) IntraMuscular two times a day PRN Agitation      Allergies    No Known Allergies    Intolerances        REVIEW OF SYSTEMS:  Negative unless otherwise specified above.    Vital Signs Last 24 Hrs  T(C): 36.1 (11 Feb 2018 09:20), Max: 36.9 (11 Feb 2018 01:13)  T(F): 97 (11 Feb 2018 09:20), Max: 98.5 (11 Feb 2018 01:13)  HR: 72 (11 Feb 2018 12:09) (72 - 170)  BP: 151/87 (11 Feb 2018 12:09) (115/84 - 180/96)  BP(mean): --  RR: 18 (11 Feb 2018 09:20) (18 - 20)  SpO2: 97% (11 Feb 2018 09:20) (94% - 99%)        PHYSICAL EXAM:  GENERAL: No apparent distress  HEAD:  Atraumatic, Normocephalic  EYES: conjunctiva and sclera clear  ENMT: Moist mucous membranes  NECK: Supple  CHEST/LUNG: Clear to auscultation bilaterally  HEART: Regular rate and rhythm  ABDOMEN: Soft, Nontender, Nondistended; Bowel sounds present  EXTREMITIES:  No clubbing, cyanosis or edema  SKIN: No rashes or lesions  NERVOUS SYSTEM:  Bilateral Lower extremity mobile, sensation to light touch intact      LABS:      Ca    8.2        10 Feb 2018 07:24          CAPILLARY BLOOD GLUCOSE          RADIOLOGY & ADDITIONAL TESTS:    Images reviewed personally    Consultant Notes Reviewed and Care Discussed with relevant Consultants/Other Providers.
Patient is a 86y old  Female who presents with a chief complaint of seizure. (09 Feb 2018 14:05)      INTERVAL History of Present Illness/OVERNIGHT EVENTS: dementia. old strokes.  DNR DNI  supportive care  overnight agitation    MEDICATIONS  (STANDING):  aspirin Suppository 300 milliGRAM(s) Rectal daily  cefTRIAXone   IVPB 1 Gram(s) IV Intermittent every 24 hours  dextrose 5% + sodium chloride 0.45% with potassium chloride 40 mEq/L 1000 milliLiter(s) (50 mL/Hr) IV Continuous <Continuous>  heparin  Injectable 5000 Unit(s) SubCutaneous every 12 hours  metoprolol    tartrate Injectable 5 milliGRAM(s) IV Push every 6 hours    MEDICATIONS  (PRN):  acetaminophen  Suppository 650 milliGRAM(s) Rectal every 6 hours PRN For Temp greater than 38 C (100.4 F)      Allergies    No Known Allergies    Intolerances        REVIEW OF SYSTEMS:  Negative unless otherwise specified above.    Vital Signs Last 24 Hrs  T(C): 36.3 (10 Feb 2018 05:40), Max: 38.4 (09 Feb 2018 13:39)  T(F): 97.4 (10 Feb 2018 05:40), Max: 101.2 (09 Feb 2018 13:39)  HR: 85 (10 Feb 2018 05:40) (71 - 130)  BP: 153/99 (10 Feb 2018 05:40) (131/76 - 178/90)  BP(mean): --  RR: 18 (10 Feb 2018 05:40) (16 - 24)  SpO2: 96% (10 Feb 2018 05:40) (93% - 97%)        PHYSICAL EXAM:  GENERAL: No apparent distress, emotionally upset this morning thinking about her mother's death  HEAD:  Atraumatic, Normocephalic  EYES: conjunctiva and sclera clear  ENMT: Moist mucous membranes  NECK: Supple  CHEST/LUNG: Clear to auscultation bilaterally  HEART: irregular rate and rhythm  ABDOMEN: Soft, Nontender, Nondistended; Bowel sounds present  EXTREMITIES:  No clubbing, cyanosis or edema  SKIN: No rashes or lesions  NERVOUS SYSTEM:  Alert & Oriented X1; Bilateral Lower extremity mobile, sensation to light touch intact      LABS:                        11.0   5.0   )-----------( 136      ( 10 Feb 2018 07:27 )             33.1     10 Feb 2018 07:24    142    |  110    |  14     ----------------------------<  109    3.7     |  23     |  1.15     Ca    8.2        10 Feb 2018 07:24          CAPILLARY BLOOD GLUCOSE      POCT Blood Glucose.: 112 mg/dL (09 Feb 2018 21:02)      RADIOLOGY & ADDITIONAL TESTS:    Images reviewed personally - cardiomegaly    Consultant Notes Reviewed and Care Discussed with relevant Consultants/Other Providers.
Patient is a 86y old  Female who presents with a chief complaint of seizure. (09 Feb 2018 14:05)      INTERVAL History of Present Illness/OVERNIGHT EVENTS: no new issues.  failed repeat swallow eval.  d/w speech therapist and daughter in person.    MEDICATIONS  (STANDING):  digoxin  Injectable 0.125 milliGRAM(s) IV Push daily  enalaprilat Injectable 1.25 milliGRAM(s) IV Push every 6 hours  heparin  Injectable 5000 Unit(s) SubCutaneous every 12 hours  metoprolol    tartrate Injectable 5 milliGRAM(s) IV Push every 4 hours    MEDICATIONS  (PRN):  acetaminophen  Suppository 650 milliGRAM(s) Rectal every 6 hours PRN For Temp greater than 38 C (100.4 F)  hydrALAZINE Injectable 5 milliGRAM(s) IV Push every 6 hours PRN SBP>180  LORazepam   Injectable 0.5 milliGRAM(s) IntraMuscular every 12 hours PRN Agitation      Allergies    No Known Allergies    Intolerances        REVIEW OF SYSTEMS:  Negative unless otherwise specified above.    Vital Signs Last 24 Hrs  T(C): 36.3 (12 Feb 2018 13:35), Max: 36.9 (11 Feb 2018 21:06)  T(F): 97.3 (12 Feb 2018 13:35), Max: 98.5 (11 Feb 2018 21:06)  HR: 106 (12 Feb 2018 13:35) (79 - 106)  BP: 178/90 (12 Feb 2018 13:35) (151/82 - 187/110)  BP(mean): --  RR: 24 (12 Feb 2018 13:35) (16 - 24)  SpO2: 95% (12 Feb 2018 13:35) (95% - 99%)        PHYSICAL EXAM:  GENERAL: No apparent distress  HEAD:  Atraumatic, Normocephalic  EYES: conjunctiva and sclera clear  ENMT: Moist mucous membranes  NECK: Supple  CHEST/LUNG: bibasilar rales this AM  HEART: irregular rate and rhythm  ABDOMEN: Soft, Nontender, Nondistended  EXTREMITIES:  No clubbing, cyanosis or edema  SKIN: No rashes or lesions  NERVOUS SYSTEM:  Awake; Bilateral Lower extremity mobile, sensation to light touch intact      LABS:                        11.8   4.9   )-----------( 150      ( 12 Feb 2018 07:21 )             35.3     12 Feb 2018 07:21    141    |  105    |  12     ----------------------------<  112    4.1     |  24     |  0.95     Ca    9.0        12 Feb 2018 07:21    TPro  7.1    /  Alb  2.8    /  TBili  0.6    /  DBili  x      /  AST  18     /  ALT  16     /  AlkPhos  70     12 Feb 2018 07:21    PT/INR - ( 12 Feb 2018 07:21 )   PT: 13.0 sec;   INR: 1.19 ratio         PTT - ( 12 Feb 2018 07:21 )  PTT:36.9 sec    CAPILLARY BLOOD GLUCOSE          RADIOLOGY & ADDITIONAL TESTS:    Images reviewed personally - CXR shows cardiomegaly and some basilar changes    Consultant Notes Reviewed and Care Discussed with relevant Consultants/Other Providers.
Patient is a 86y old  Female who presents with a chief complaint of seizure. (09 Feb 2018 14:05)      INTERVAL History of Present Illness/OVERNIGHT EVENTS: possible stroke causing deterioration in functional status.  await PEG for multiple failed swallow evaluations.  high risk patient for low risk procedure.    MEDICATIONS  (STANDING):  dextrose 5% + sodium chloride 0.45%. 1000 milliLiter(s) (50 mL/Hr) IV Continuous <Continuous>  digoxin  Injectable 0.125 milliGRAM(s) IV Push daily  enalaprilat Injectable 1.25 milliGRAM(s) IV Push every 6 hours  heparin  Injectable 5000 Unit(s) SubCutaneous every 12 hours  metoprolol    tartrate Injectable 5 milliGRAM(s) IV Push every 4 hours    MEDICATIONS  (PRN):  acetaminophen  Suppository 650 milliGRAM(s) Rectal every 6 hours PRN For Temp greater than 38 C (100.4 F)  hydrALAZINE Injectable 5 milliGRAM(s) IV Push every 6 hours PRN SBP>180  LORazepam   Injectable 0.5 milliGRAM(s) IntraMuscular every 12 hours PRN Agitation      Allergies    No Known Allergies    Intolerances        REVIEW OF SYSTEMS:  Negative unless otherwise specified above.    Vital Signs Last 24 Hrs  T(C): 36.9 (14 Feb 2018 09:32), Max: 36.9 (13 Feb 2018 13:11)  T(F): 98.4 (14 Feb 2018 09:32), Max: 98.5 (13 Feb 2018 13:11)  HR: 100 (14 Feb 2018 09:32) (81 - 139)  BP: 151/84 (14 Feb 2018 09:32) (130/81 - 190/103)  BP(mean): --  RR: 20 (14 Feb 2018 09:32) (20 - 20)  SpO2: 93% (14 Feb 2018 09:32) (93% - 97%)    Height (cm): 144.78 (02-14 @ 10:32)  Weight (kg): 40.8 (02-14 @ 09:18)  BMI (kg/m2): 19.5 (02-14 @ 10:32)  BSA (m2): 1.28 (02-14 @ 10:32)    PHYSICAL EXAM:  GENERAL: No apparent distress, sleeping earlier  HEAD:  Atraumatic, Normocephalic  EYES: conjunctiva and sclera clear  ENMT: Moist mucous membranes  NECK: Supple  CHEST/LUNG: bilateral breath sounds  HEART: irregular rate and rhythm  ABDOMEN: Soft, Nontender, Nondistended  EXTREMITIES:  No clubbing, cyanosis or edema  SKIN: No rashes or lesions  NERVOUS SYSTEM:  Bilateral Lower extremity mobile      LABS:                        12.4   6.7   )-----------( 186      ( 14 Feb 2018 07:56 )             37.4     14 Feb 2018 07:27    143    |  107    |  28     ----------------------------<  138    3.5     |  24     |  1.19     Ca    8.7        14 Feb 2018 07:27    TPro  6.6    /  Alb  3.0    /  TBili  0.6    /  DBili  x      /  AST  16     /  ALT  18     /  AlkPhos  68     14 Feb 2018 07:27        CAPILLARY BLOOD GLUCOSE          RADIOLOGY & ADDITIONAL TESTS:    Images reviewed personally    Consultant Notes Reviewed and Care Discussed with relevant Consultants/Other Providers.
Patient is a 86y old  Female who presents with a chief complaint of seizure. (09 Feb 2018 14:05)      INTERVAL History of Present Illness/OVERNIGHT EVENTS: tolerating PEG feeds    MEDICATIONS  (STANDING):  dextrose 5% + sodium chloride 0.45%. 1000 milliLiter(s) (10 mL/Hr) IV Continuous <Continuous>  digoxin     Tablet 0.125 milliGRAM(s) Oral daily  enalapril 5 milliGRAM(s) Oral two times a day  famotidine    Tablet 20 milliGRAM(s) Oral daily  heparin  Injectable 5000 Unit(s) SubCutaneous every 12 hours  metoprolol     tartrate 25 milliGRAM(s) Oral every 8 hours    MEDICATIONS  (PRN):  acetaminophen   Tablet. 650 milliGRAM(s) Oral every 6 hours PRN Moderate Pain (4 - 6)  hydrALAZINE Injectable 5 milliGRAM(s) IV Push every 6 hours PRN SBP>180  LORazepam   Injectable 0.5 milliGRAM(s) IntraMuscular every 12 hours PRN Agitation      Allergies    No Known Allergies    Intolerances        REVIEW OF SYSTEMS:  Negative unless otherwise specified above.    Vital Signs Last 24 Hrs  T(C): 36.9 (15 Feb 2018 10:30), Max: 37.1 (15 Feb 2018 05:00)  T(F): 98.4 (15 Feb 2018 10:30), Max: 98.7 (15 Feb 2018 05:00)  HR: 103 (15 Feb 2018 10:30) (69 - 110)  BP: 139/76 (15 Feb 2018 10:30) (132/76 - 201/75)  BP(mean): --  RR: 19 (15 Feb 2018 10:30) (16 - 21)  SpO2: 97% (15 Feb 2018 10:30) (94% - 99%)        PHYSICAL EXAM:  GENERAL: No apparent distress  HEAD:  Atraumatic, Normocephalic  EYES: conjunctiva and sclera clear  ENMT: Moist mucous membranes  NECK: Supple  CHEST/LUNG: Clear to auscultation bilaterally  HEART: irregular rate and rhythm  ABDOMEN: Soft, Nontender, Nondistended; +PEG  EXTREMITIES:  No clubbing, cyanosis or edema  SKIN: No rashes or lesions  NERVOUS SYSTEM:  Alert & Oriented X1; Bilateral Lower extremity mobile, sensation to light touch intact      LABS:                        11.4   5.8   )-----------( 179      ( 15 Feb 2018 07:41 )             33.7     15 Feb 2018 07:41    143    |  105    |  22     ----------------------------<  134    3.3     |  27     |  0.96     Ca    8.6        15 Feb 2018 07:41    TPro  6.1    /  Alb  2.6    /  TBili  0.5    /  DBili  x      /  AST  15     /  ALT  12     /  AlkPhos  64     15 Feb 2018 07:41        CAPILLARY BLOOD GLUCOSE          RADIOLOGY & ADDITIONAL TESTS:    Images reviewed personally CXR clear    Consultant Notes Reviewed and Care Discussed with relevant Consultants/Other Providers.
Patient is a 86y old  Female who presents with a chief complaint of seizure. (2018 12:56)      INTERVAL HPI/OVERNIGHT EVENTS:  Pt is seen and examined  pt is confused.  pt is moving her four extrimities.  +global ahagia.  ?spasm of right upper extremity    Pain Location & Control:     MEDICATIONS  (STANDING):  aspirin 325 milliGRAM(s) Oral daily  cefTRIAXone   IVPB 1 Gram(s) IV Intermittent every 24 hours  dextrose 5% + sodium chloride 0.45%. 1000 milliLiter(s) (50 mL/Hr) IV Continuous <Continuous>  heparin  Injectable 5000 Unit(s) SubCutaneous every 12 hours  metoprolol    tartrate Injectable 2.5 milliGRAM(s) IV Push every 6 hours    MEDICATIONS  (PRN):      Allergies    No Known Allergies    Intolerances            Vital Signs Last 24 Hrs  T(C): 36.9 (2018 09:08), Max: 37 (2018 00:05)  T(F): 98.4 (2018 09:08), Max: 98.6 (2018 00:05)  HR: 78 (2018 09:08) (59 - 109)  BP: 145/85 (2018 09:08) (143/85 - 184/92)  BP(mean): --  RR: 19 (2018 09:08) (18 - 20)  SpO2: 94% (2018 09:08) (94% - 99%)        LABS:                        10.4   5.9   )-----------( 111      ( 2018 07:42 )             32.4     2018 07:42    143    |  109    |  14     ----------------------------<  111    3.5     |  24     |  0.92     Ca    8.2        2018 07:42      PT/INR - ( 2018 05:47 )   PT: 11.8 sec;   INR: 1.08 ratio         PTT - ( 2018 05:47 )  PTT:30.9 sec  Urinalysis Basic - ( 2018 05:56 )    Color: Yellow / Appearance: Clear / S.015 / pH: x  Gluc: x / Ketone: Negative  / Bili: Negative / Urobili: Negative mg/dL   Blood: x / Protein: 100 mg/dL / Nitrite: Positive   Leuk Esterase: Small / RBC: 0-2 /HPF / WBC 11-25   Sq Epi: x / Non Sq Epi: Few / Bacteria: Moderate      RADIOLOGY & ADDITIONAL TESTS:    Imaging Personally Reviewed:  [ ] YES  [ ] NO    Consultant(s) Notes Reviewed:  [ ] YES  [ ] NO    Care Discussed with Consultants/Other Providers [x ] YES  [ ] NO
REASON FOR VISIT: AF    HPI:   86 year old woman with atrial fibrillation (not chronically anticoagulated due to subdural hematoma), non-ischemic cardiomyopathy, moderate aortic insufficiency, medication-nonadherence, MDS, CKD, admitted 2/6/18 with seizures; brain imaging reveals areas of CVA.    2/13/18: Mildly confused / agitated; denies: dyspnea, pain.    MEDICATIONS  (STANDING):  digoxin  Injectable 0.125 milliGRAM(s) IV Push daily  enalaprilat Injectable 1.25 milliGRAM(s) IV Push every 6 hours  heparin  Injectable 5000 Unit(s) SubCutaneous every 12 hours  metoprolol    tartrate Injectable 5 milliGRAM(s) IV Push every 4 hours    MEDICATIONS  (PRN):  acetaminophen  Suppository 650 milliGRAM(s) Rectal every 6 hours PRN For Temp greater than 38 C (100.4 F)  hydrALAZINE Injectable 5 milliGRAM(s) IV Push every 6 hours PRN SBP>180  LORazepam   Injectable 0.5 milliGRAM(s) IntraMuscular every 12 hours PRN Agitation    Vital Signs Last 24 Hrs  T(C): 37.1 (13 Feb 2018 09:25), Max: 37.1 (13 Feb 2018 09:25)  T(F): 98.7 (13 Feb 2018 09:25), Max: 98.7 (13 Feb 2018 09:25)  HR: 96 (13 Feb 2018 09:25) (86 - 106)  BP: 140/80 (13 Feb 2018 09:25) (140/80 - 186/84)  RR: 20 (13 Feb 2018 09:25) (18 - 24)  SpO2: 90% (13 Feb 2018 09:25) (90% - 98%)    PHYSICAL EXAM:  Constitutional: Chronically-ill appearing, awake, confused, supine in bed (no distress)  Respiratory: Breath sounds are clear bilaterally  Cardiovascular: S1 and S2, irregular rate, mildly tachycardic  Gastrointestinal: Bowel Sounds present, soft, nontender.   Extremities: No pedal edema.   Musculoskeletal: no calf tenderness.  Skin: No rashes.    LABS:                 13.6   4.9   )-----------( 189      ( 13 Feb 2018 07:56 )             41.0     141  |  105  |  21  ----------------------------<  88  3.6   |  23  |  1.15    Ca    9.3      13 Feb 2018 07:56  TPro  7.5  /  Alb  3.0<L>  /  TBili  0.6  /  DBili  x   /  AST  18  /  ALT  21  /  AlkPhos  70  02-13    US Transthoracic Echocardiogram w/Doppler Complete (02.06.18 @ 13:47)   Mitral annular calcification with moderate mitral regurgitation  Moderate aortic regurgitation  Enlarged right and left atrium  Left ventricular ejection fraction 50%.    ECG (2/9 @ 6:53):   bp, poor R wave progression (cannot exclude old anterior infarct), nonspecific T wave abnormality -- similar to 2/6 ECG    TELE: AF
REASON FOR VISIT: AF    HPI:   86 year old woman with atrial fibrillation (not chronically anticoagulated due to subdural hematoma), non-ischemic cardiomyopathy, moderate aortic insufficiency, medication-nonadherence, MDS, CKD, admitted 2/6/18 with seizures; brain imaging reveals areas of CVA.    2/14/18:  Awaiting PEG; drowsy; confused.    MEDICATIONS  (STANDING):  ceFAZolin   IVPB 1000 milliGRAM(s) IV Intermittent once  dextrose 5% + sodium chloride 0.45%. 1000 milliLiter(s) (50 mL/Hr) IV Continuous <Continuous>  digoxin  Injectable 0.125 milliGRAM(s) IV Push daily  enalaprilat Injectable 1.25 milliGRAM(s) IV Push every 6 hours  heparin  Injectable 5000 Unit(s) SubCutaneous every 12 hours  metoprolol    tartrate Injectable 5 milliGRAM(s) IV Push every 4 hours    MEDICATIONS  (PRN):  acetaminophen  Suppository 650 milliGRAM(s) Rectal every 6 hours PRN For Temp greater than 38 C (100.4 F)  hydrALAZINE Injectable 5 milliGRAM(s) IV Push every 6 hours PRN SBP>180  LORazepam   Injectable 0.5 milliGRAM(s) IntraMuscular every 12 hours PRN Agitation    Vital Signs Last 24 Hrs  T(C): 36.9 (14 Feb 2018 05:49), Max: 37.1 (13 Feb 2018 09:25)  T(F): 98.4 (14 Feb 2018 05:49), Max: 98.7 (13 Feb 2018 09:25)  HR: 95 (14 Feb 2018 05:49) (81 - 139)  BP: 153/94 (14 Feb 2018 05:49) (130/81 - 190/103)  RR: 20 (14 Feb 2018 05:49) (20 - 20)  SpO2: 94% (14 Feb 2018 05:49) (90% - 97%)    PHYSICAL EXAM:  Constitutional: Chronically-ill appearing, asleep, arousable but somnolent  Respiratory: Breath sounds are clear bilaterally  Cardiovascular: S1 and S2, irregular rate, normal rate  Gastrointestinal: Bowel Sounds present, soft, nontender.   Extremities: No pedal edema.   Skin: No rashes.    LABS:      CARDIAC MARKERS ( 14 Feb 2018 02:25 ) .168 ng/mL / x     / 44 U/L / x     / 1.8 ng/mL                     12.4   6.7   )-----------( 186      ( 14 Feb 2018 07:56 )             37.4     143  |  107  |  28<H>  ----------------------------<  138<H>  3.5   |  24  |  1.19    US Transthoracic Echocardiogram w/Doppler Complete (02.06.18 @ 13:47)   Mitral annular calcification with moderate mitral regurgitation  Moderate aortic regurgitation  Enlarged right and left atrium  Left ventricular ejection fraction 50%.    ECG (2/9 @ 6:53):   bp, poor R wave progression (cannot exclude old anterior infarct), nonspecific T wave abnormality -- similar to 2/6 ECG    TELE: AF
REASON FOR VISIT: AF    HPI:   86 year old woman with atrial fibrillation (not chronically anticoagulated due to subdural hematoma), non-ischemic cardiomyopathy, moderate aortic insufficiency, medication-nonadherence, MDS, CKD, admitted 2/6/18 with seizures; brain imaging reveals areas of CVA.    2/9/18:  Confused mildly agitated.    2/11/18: still confused.  No complaints.  issues with controlling her her HR as she is not able to take meds by mouth.    MEDICATIONS  (STANDING):  aspirin Suppository 300 milliGRAM(s) Rectal daily  dextrose 5% + sodium chloride 0.45% with potassium chloride 40 mEq/L 1000 milliLiter(s) (50 mL/Hr) IV Continuous <Continuous>  heparin  Injectable 5000 Unit(s) SubCutaneous every 12 hours  metoprolol    tartrate Injectable 5 milliGRAM(s) IV Push every 6 hours    MEDICATIONS  (PRN):  acetaminophen  Suppository 650 milliGRAM(s) Rectal every 6 hours PRN For Temp greater than 38 C (100.4 F)  haloperidol    Injectable 0.5 milliGRAM(s) IntraMuscular every 8 hours PRN Agitation  LORazepam   Injectable 0.25 milliGRAM(s) IntraMuscular two times a day PRN Agitation      Vital Signs Last 24 Hrs  T(C): 36.1 (11 Feb 2018 09:20), Max: 36.9 (11 Feb 2018 01:13)  T(F): 97 (11 Feb 2018 09:20), Max: 98.5 (11 Feb 2018 01:13)  HR: 72 (11 Feb 2018 12:09) (72 - 170)  BP: 151/87 (11 Feb 2018 12:09) (115/84 - 180/96)  BP(mean): --  RR: 18 (11 Feb 2018 09:20) (18 - 20)  SpO2: 97% (11 Feb 2018 09:20) (94% - 99%)    I&O's Detail    10 Feb 2018 07:01  -  11 Feb 2018 07:00  --------------------------------------------------------  IN:    dextrose 5% + sodium chloride 0.45% with potassium chloride 40 mEq/L: 1150 mL  Total IN: 1150 mL    OUT:  Total OUT: 0 mL    Total NET: 1150 mL      11 Feb 2018 07:01  -  11 Feb 2018 13:49  --------------------------------------------------------  IN:    diltiazem Infusion: 20 mL  Total IN: 20 mL    OUT:  Total OUT: 0 mL    Total NET: 20 mL          Daily     Daily   PHYSICAL EXAM:    Constitutional: Chronically-ill appearing, awake, confused  Respiratory: Breath sounds are clear bilaterally, No wheezing, rales or rhonchi  Cardiovascular: S1 and S2, irregular rate, tachycardic  Gastrointestinal: Bowel Sounds present, soft, nontender.   Extremities: No pedal edema.   Musculoskeletal: no calf tenderness.  Skin: No rashes.    LABS:                         11.0   5.0   )-----------( 136      ( 10 Feb 2018 07:27 )             33.1     02-10    142  |  110<H>  |  14  ----------------------------<  109<H>  3.7   |  23  |  1.15    Ca    8.2<L>      10 Feb 2018 07:24        US Transthoracic Echocardiogram w/Doppler Complete (02.06.18 @ 13:47)   Mitral annular calcification with moderate mitral regurgitation  Moderate aortic regurgitation  Enlarged right and left atrium  Left ventricular ejection fraction 50%.    ECG (2/9 @ 6:53):   bp, poor R wave progression (cannot exclude old anterior infarct), nonspecific T wave abnormality -- similar to 2/6 ECG
REASON FOR VISIT: AF    HPI:   86 year old woman with atrial fibrillation (not chronically anticoagulated due to subdural hematoma), non-ischemic cardiomyopathy, moderate aortic insufficiency, medication-nonadherence, MDS, CKD, admitted 2/6/18 with seizures; brain imaging reveals areas of CVA.    2/9/18:  Confused mildly agitated.    2/11/18: still confused.  No complaints.  issues with controlling her her HR as she is not able to take meds by mouth.  2/12/18 CONFUSED,HR BETTER CONTROLLED    MEDICATIONS  (STANDING):  aspirin Suppository 300 milliGRAM(s) Rectal daily  dextrose 5% + sodium chloride 0.45% with potassium chloride 40 mEq/L 1000 milliLiter(s) (50 mL/Hr) IV Continuous <Continuous>  heparin  Injectable 5000 Unit(s) SubCutaneous every 12 hours  metoprolol    tartrate Injectable 5 milliGRAM(s) IV Push every 6 hours    MEDICATIONS  (PRN):  acetaminophen  Suppository 650 milliGRAM(s) Rectal every 6 hours PRN For Temp greater than 38 C (100.4 F)  haloperidol    Injectable 0.5 milliGRAM(s) IntraMuscular every 8 hours PRN Agitation  LORazepam   Injectable 0.25 milliGRAM(s) IntraMuscular two times a day PRN Agitation      Vital Signs Last 24 HrsICU Vital Signs Last 24 Hrs  T(C): 36.2 (12 Feb 2018 05:36), Max: 36.9 (11 Feb 2018 13:59)  T(F): 97.2 (12 Feb 2018 05:36), Max: 98.5 (11 Feb 2018 13:59)  HR: 97 (12 Feb 2018 05:36) (72 - 113)  BP: 151/82 (12 Feb 2018 06:03) (149/55 - 187/110)  BP(mean): --  ABP: --    I&O's Detail    10 Feb 2018 07:01  -  11 Feb 2018 07:00  --------------------------------------------------------  IN:    dextrose 5% + sodium chloride 0.45% with potassium chloride 40 mEq/L: 1150 mL  Total IN: 1150 mL    OUT:  Total OUT: 0 mL    Total NET: 1150 mL      11 Feb 2018 07:01  -  11 Feb 2018 13:49  --------------------------------------------------------  IN:    diltiazem Infusion: 20 mL  Total IN: 20 mL    OUT:  Total OUT: 0 mL    Total NET: 20 mL          Daily     Daily   PHYSICAL EXAM:    Constitutional: Chronically-ill appearing, awake, confused  Respiratory: Breath sounds are clear bilaterally, No wheezing, rales or rhonchi  Cardiovascular: S1 and S2, irregular rate, tachycardic  Gastrointestinal: Bowel Sounds present, soft, nontender.   Extremities: No pedal edema.   Musculoskeletal: no calf tenderness.  Skin: No rashes.    LABS:                         11.0   5.0   )-----------( 136      ( 10 Feb 2018 07:27 )             33.1     02-10    142  |  110<H>  |  14  ----------------------------<  109<H>  3.7   |  23  |  1.15    Ca    8.2<L>      10 Feb 2018 07:24        US Transthoracic Echocardiogram w/Doppler Complete (02.06.18 @ 13:47)   Mitral annular calcification with moderate mitral regurgitation  Moderate aortic regurgitation  Enlarged right and left atrium  Left ventricular ejection fraction 50%.    ECG (2/9 @ 6:53):   bp, poor R wave progression (cannot exclude old anterior infarct), nonspecific T wave abnormality -- similar to 2/6 ECG  TELE:
REASON FOR VISIT: AF    HPI:   86 year old woman with atrial fibrillation (not chronically anticoagulated due to subdural hematoma), non-ischemic cardiomyopathy, moderate aortic insufficiency, medication-nonadherence, MDS, CKD, admitted 2/6/18 with seizures; brain imaging reveals areas of CVA.    2/9/18:  Confused mildly agitated.    MEDICATIONS  (STANDING):  aspirin 325 milliGRAM(s) Oral daily  cefTRIAXone   IVPB 1 Gram(s) IV Intermittent every 24 hours  dextrose 5% + sodium chloride 0.45% with potassium chloride 40 mEq/L 1000 milliLiter(s) (50 mL/Hr) IV Continuous <Continuous>  heparin  Injectable 5000 Unit(s) SubCutaneous every 12 hours  metoprolol    tartrate Injectable 5 milliGRAM(s) IV Push once  metoprolol    tartrate Injectable 5 milliGRAM(s) IV Push every 6 hours    Vital Signs Last 24 Hrs  T(C): 37.4 (09 Feb 2018 09:15), Max: 37.4 (09 Feb 2018 09:15)  T(F): 99.4 (09 Feb 2018 09:15), Max: 99.4 (09 Feb 2018 09:15)  HR: 128 (09 Feb 2018 09:51) (78 - 128)  BP: 178/90 (09 Feb 2018 09:51) (165/92 - 187/98)  RR: 20 (09 Feb 2018 09:15) (16 - 20)  SpO2: 96% (09 Feb 2018 09:15) (95% - 97%)    PHYSICAL EXAM:    Constitutional: Chronically-ill appearing, awake, confused  Respiratory: Breath sounds are clear bilaterally, No wheezing, rales or rhonchi  Cardiovascular: S1 and S2, irregular rate, tachycardic  Gastrointestinal: Bowel Sounds present, soft, nontender.   Extremities: No pedal edema.   Musculoskeletal: no calf tenderness.  Skin: No rashes.    LABS:   CARDIAC MARKERS ( 09 Feb 2018 06:59 ) .027 ng/mL / x     / x     / x     / x                         11.3   7.0   )-----------( 143      ( 09 Feb 2018 06:50 )             34.1     142  |  108  |  12  ----------------------------<  122<H>  3.8   |  23  |  1.07    Tele: AF with RVR    US Transthoracic Echocardiogram w/Doppler Complete (02.06.18 @ 13:47)   Mitral annular calcification with moderate mitral regurgitation  Moderate aortic regurgitation  Enlarged right and left atrium  Left ventricular ejection fraction 50%.    ECG (2/9 @ 6:53):   bp, poor R wave progression (cannot exclude old anterior infarct), nonspecific T wave abnormality -- similar to 2/6 ECG
s/p Peg   no complications   successfully placed 20f push gastrostomy tube    -begin tube feeding in 8 hours  -ok to use peg for medication delivery now  -abdominal binder to protect peg  -check CBC at 10pm
Neurology Follow up note    MARIA DEL ROSARIO BIGGS 86y Female    HPI:  86 y.o. F found by NH having seizure episode - no h/o seizures in the past - EMS gave versed 10mg IM in the ambulance with seizure halting about 2minutes later, had already been at least 20minutes of seizure episode by then, in ED found to be febrile, information limited, pt has AMS, spoke with daughter  - MOLST states DNR/DNI/no hospitalizations/limited interventions - at this time daughter wants everything done, but not sure about invasive studies, discussed LP, will start with labs/ct head/give 1st dose abx and rediscuss with daughter.  In ED pt was found to have Right sided weakness and she was not able to speak..  NIHSS couldn't be accessed accurately.  Pt is not a tPa candidate as her symptoms onset time is NOT known.  Daughter is at bedside.  In ED pt has elevated serum Na. Volume depletion and also signs of UTI. (06 Feb 2018 12:56)    Interval History -    Patient is seen, chart was reviewed and case was discussed with the treatment team.      Vital Signs Last 24 Hrs  T(C): 36.9 (14 Feb 2018 09:32), Max: 36.9 (13 Feb 2018 13:11)  T(F): 98.4 (14 Feb 2018 09:32), Max: 98.5 (13 Feb 2018 13:11)  HR: 100 (14 Feb 2018 09:32) (81 - 139)  BP: 151/84 (14 Feb 2018 09:32) (130/81 - 190/103)  BP(mean): --  RR: 20 (14 Feb 2018 09:32) (20 - 20)  SpO2: 93% (14 Feb 2018 09:32) (93% - 97%)    Height (cm): 144.78 (02-14 @ 09:18)  Weight (kg): 40.8 (02-14 @ 09:18)  BMI (kg/m2): 19.5 (02-14 @ 09:18)      REVIEW OF SYSTEMS:    CONSTITUTIONAL: No fever  EYES: No eye pain,   ENMT:  No sinus or throat pain  NECK: No pain or stiffness  RESPIRATORY: No cough, No hemoptysis; No shortness of breath  CARDIOVASCULAR: No acute chest pain, palpitations,  or leg swelling  GASTROINTESTINAL: Dysphagia  GENITOURINARY: No  hematuria, or incontinence  MUSCULOSKELETAL: No joint swelling; No extremity pain  SKIN: No itching, rashes, or lesions   LYMPH NODES: No enlarged glands  NEUROLOGICAL: No headaches, H/O memory loss,   PSYCHIATRIC: No depression, anxiety, mood swings, or difficulty sleeping  ENDOCRINE: No heat or cold intolerance;   HEME/LYMPH: No easy bruising, or bleeding gums  Allergy/Immunology. No medication allergy. No seasonal allergies.    GENERAL: NAD, well-groomed, well-developed  HEAD:  Atraumatic, Normocephalic  EYES: EOMI, PERRLA, conjunctiva and sclera clear  NECK: Supple, No JVD, thyroid non-palpable    On Neurological Examination:    Mental Status - Opens eyes. tries to follows commands.    Speech - Able to say few words.     Cranial Nerves - Pupils 3 mm equal and reactive to light. Pt has no obvious facial asymmetry. Tongue - is in midline.    Motor Exam - Moves left sided extremities equally. Right UE 3+/5.  Moves right LE on stimulation. Exact strength couldn't be accessed.    Sensory Exam - Pt withdraws all extremities equally on stimulation.     Gait - Couldn't be accessed.    Deep tendon Reflexes - 2 plus all over.    Neck Supple -  Yes.    MEDICATIONS    acetaminophen  Suppository 650 milliGRAM(s) Rectal every 6 hours PRN  ceFAZolin   IVPB 1000 milliGRAM(s) IV Intermittent once  dextrose 5% + sodium chloride 0.45%. 1000 milliLiter(s) IV Continuous <Continuous>  digoxin  Injectable 0.125 milliGRAM(s) IV Push daily  enalaprilat Injectable 1.25 milliGRAM(s) IV Push every 6 hours  heparin  Injectable 5000 Unit(s) SubCutaneous every 12 hours  hydrALAZINE Injectable 5 milliGRAM(s) IV Push every 6 hours PRN  LORazepam   Injectable 0.5 milliGRAM(s) IntraMuscular every 12 hours PRN  metoprolol    tartrate Injectable 5 milliGRAM(s) IV Push every 4 hours    Allergies    No Known Allergies    LABS:    CBC Full  -  ( 14 Feb 2018 07:56 )  WBC Count : 6.7 K/uL  Hemoglobin : 12.4 g/dL  Hematocrit : 37.4 %  Platelet Count - Automated : 186 K/uL  Mean Cell Volume : 91.6 fl  Mean Cell Hemoglobin : 30.2 pg  Mean Cell Hemoglobin Concentration : 33.0 gm/dL  Auto Neutrophil # : 5.2 K/uL  Auto Lymphocyte # : 0.9 K/uL  Auto Monocyte # : 0.5 K/uL  Auto Eosinophil # : 0.0 K/uL  Auto Basophil # : 0.1 K/uL  Auto Neutrophil % : 77.4 %  Auto Lymphocyte % : 13.7 %  Auto Monocyte % : 07.8 %  Auto Eosinophil % : 0.1 %  Auto Basophil % : 0.9 %    02-14    143  |  107  |  28<H>  ----------------------------<  138<H>  3.5   |  24  |  1.19    Ca    8.7      14 Feb 2018 07:27    TPro  6.6  /  Alb  3.0<L>  /  TBili  0.6  /  DBili  x   /  AST  16  /  ALT  18  /  AlkPhos  68  02-14

## 2018-02-15 NOTE — PROGRESS NOTE ADULT - ASSESSMENT
s/p Peg-    no complications   successfully placed 20f push gastrostomy tube 2/13    -enteral feedings to goal via peg  -ok to use peg for medication delivery  -abdominal binder to protect peg  -acid suppression   - d/c planning

## 2018-02-15 NOTE — PROGRESS NOTE ADULT - PROVIDER SPECIALTY LIST ADULT
Cardiology
Gastroenterology
Hospitalist
Infectious Disease
Neurology
Pulmonology
Cardiology
Neurology
Neurology

## 2018-02-15 NOTE — PROGRESS NOTE ADULT - PROBLEM SELECTOR PLAN 1
PEG placed  will dec IVF  on TF  HOB elev  remains risk for aspiration  oral hygiene  skin care  chest pt
dysphagia  aspiration  frailty and mild dementia  for poss PEG  pt is NPO  asp prec, HOB elev, oral hygiene, skin care, keep sat > 88 pct  no objections for PEG procedure from Pulmonary  support with ADL
frail and weak  dysphagia  HF  atelectasis  planned for possible PEG, however, daughter has not committed yet  cont supportive management  pt got lasix yesterday  overall prognosis poor  high risk of aspiration which will not be mitigated by PEG
Pt is able to move her all extremities.  +global aphagia.  CT Head - No acute pathology. Old Infarcts.  CVA in Left MCA area clinically.  Admit to monitored bed.  NPO   [place]  Pt is DNR/DNI.  Limited CVA w/up will be done with agreement of daughter at bedside.  Would do repeat CT head in 48 hrs.  Carotid doppler/2D Echo will NOT be done.  Lipid panel/HbA1c.  Dysphagia eval.  PT Eval/Speech and swallowing eval.  PT Eval.  Pt's daughter doesn't want any artificial nutrition at present. might cosider for comfort care.
Pt is able to move her all extremities.  +global aphagia.  CT Head - No acute pathology. Old Infarcts.  CVA in Left MCA area clinically.  Admit to monitored bed.  NPO   [place]  Pt is DNR/DNI.  Limited CVA w/up will be done with agreement of daughter at bedside.  Would do repeat CT head in 48 hrs.  Carotid doppler/2D Echo will NOT be done.  Lipid panel/HbA1c.  Dysphagia eval.  will get reval/Speech and swallowing as pt is more awake.   PT Eval.  family might consider for PEG if pt fails swallow study.
Pt is able to move her all extremities.  +global aphagia.  CT Head - No acute pathology. Old Infarcts.  CVA in Left MCA area clinically.  Admit to monitored bed.  NPO   [place]  Pt is DNR/DNI.  Limited CVA w/up will be done with agreement of daughter at bedside.  Would do repeat CT head in 48 hrs.  Carotid doppler/2D Echo will NOT be done.  Lipid panel/HbA1c.  Dysphagia eval.  PT Eval/Speech and swallowing eval.  PT Eval.

## 2018-03-30 ENCOUNTER — INPATIENT (INPATIENT)
Facility: HOSPITAL | Age: 83
LOS: 3 days | Discharge: NURSING, REHAB OR LONG-TERM CARE | DRG: 65 | End: 2018-04-03
Attending: STUDENT IN AN ORGANIZED HEALTH CARE EDUCATION/TRAINING PROGRAM | Admitting: FAMILY MEDICINE
Payer: MEDICARE

## 2018-03-30 VITALS
SYSTOLIC BLOOD PRESSURE: 133 MMHG | WEIGHT: 85.1 LBS | HEART RATE: 73 BPM | TEMPERATURE: 97 F | RESPIRATION RATE: 18 BRPM | OXYGEN SATURATION: 100 % | DIASTOLIC BLOOD PRESSURE: 67 MMHG

## 2018-03-30 DIAGNOSIS — I48.91 UNSPECIFIED ATRIAL FIBRILLATION: ICD-10-CM

## 2018-03-30 DIAGNOSIS — J96.22 ACUTE AND CHRONIC RESPIRATORY FAILURE WITH HYPERCAPNIA: ICD-10-CM

## 2018-03-30 DIAGNOSIS — E88.09 OTHER DISORDERS OF PLASMA-PROTEIN METABOLISM, NOT ELSEWHERE CLASSIFIED: ICD-10-CM

## 2018-03-30 DIAGNOSIS — D45 POLYCYTHEMIA VERA: ICD-10-CM

## 2018-03-30 DIAGNOSIS — I50.9 HEART FAILURE, UNSPECIFIED: ICD-10-CM

## 2018-03-30 DIAGNOSIS — K59.00 CONSTIPATION, UNSPECIFIED: ICD-10-CM

## 2018-03-30 DIAGNOSIS — Z29.9 ENCOUNTER FOR PROPHYLACTIC MEASURES, UNSPECIFIED: ICD-10-CM

## 2018-03-30 DIAGNOSIS — I10 ESSENTIAL (PRIMARY) HYPERTENSION: ICD-10-CM

## 2018-03-30 DIAGNOSIS — R56.9 UNSPECIFIED CONVULSIONS: ICD-10-CM

## 2018-03-30 DIAGNOSIS — Z93.1 GASTROSTOMY STATUS: Chronic | ICD-10-CM

## 2018-03-30 DIAGNOSIS — F03.90 UNSPECIFIED DEMENTIA WITHOUT BEHAVIORAL DISTURBANCE: ICD-10-CM

## 2018-03-30 LAB
ALBUMIN SERPL ELPH-MCNC: 2.7 G/DL — LOW (ref 3.3–5)
ALP SERPL-CCNC: 71 U/L — SIGNIFICANT CHANGE UP (ref 40–120)
ALT FLD-CCNC: 18 U/L — SIGNIFICANT CHANGE UP (ref 12–78)
ANION GAP SERPL CALC-SCNC: 8 MMOL/L — SIGNIFICANT CHANGE UP (ref 5–17)
APPEARANCE UR: CLEAR — SIGNIFICANT CHANGE UP
APTT BLD: 30.7 SEC — SIGNIFICANT CHANGE UP (ref 27.5–37.4)
AST SERPL-CCNC: 18 U/L — SIGNIFICANT CHANGE UP (ref 15–37)
BASOPHILS # BLD AUTO: 0.1 K/UL — SIGNIFICANT CHANGE UP (ref 0–0.2)
BASOPHILS NFR BLD AUTO: 1.9 % — SIGNIFICANT CHANGE UP (ref 0–2)
BILIRUB SERPL-MCNC: 0.4 MG/DL — SIGNIFICANT CHANGE UP (ref 0.2–1.2)
BILIRUB UR-MCNC: NEGATIVE — SIGNIFICANT CHANGE UP
BUN SERPL-MCNC: 22 MG/DL — SIGNIFICANT CHANGE UP (ref 7–23)
CALCIUM SERPL-MCNC: 8 MG/DL — LOW (ref 8.5–10.1)
CHLORIDE SERPL-SCNC: 111 MMOL/L — HIGH (ref 96–108)
CK MB BLD-MCNC: 2.9 % — SIGNIFICANT CHANGE UP (ref 0–3.5)
CK MB CFR SERPL CALC: 1.7 NG/ML — SIGNIFICANT CHANGE UP (ref 0–3.6)
CK SERPL-CCNC: 59 U/L — SIGNIFICANT CHANGE UP (ref 26–192)
CO2 SERPL-SCNC: 27 MMOL/L — SIGNIFICANT CHANGE UP (ref 22–31)
COLOR SPEC: YELLOW — SIGNIFICANT CHANGE UP
CREAT SERPL-MCNC: 0.84 MG/DL — SIGNIFICANT CHANGE UP (ref 0.5–1.3)
DIFF PNL FLD: NEGATIVE — SIGNIFICANT CHANGE UP
DIGOXIN SERPL-MCNC: 1.5 NG/ML — SIGNIFICANT CHANGE UP (ref 0.8–2)
EOSINOPHIL # BLD AUTO: 0.1 K/UL — SIGNIFICANT CHANGE UP (ref 0–0.5)
EOSINOPHIL NFR BLD AUTO: 2.4 % — SIGNIFICANT CHANGE UP (ref 0–6)
EPI CELLS # UR: SIGNIFICANT CHANGE UP
GLUCOSE SERPL-MCNC: 88 MG/DL — SIGNIFICANT CHANGE UP (ref 70–99)
GLUCOSE UR QL: NEGATIVE — SIGNIFICANT CHANGE UP
HCT VFR BLD CALC: 29.8 % — LOW (ref 34.5–45)
HGB BLD-MCNC: 9.7 G/DL — LOW (ref 11.5–15.5)
INR BLD: 1.01 RATIO — SIGNIFICANT CHANGE UP (ref 0.88–1.16)
KETONES UR-MCNC: NEGATIVE — SIGNIFICANT CHANGE UP
LACTATE SERPL-SCNC: 0.6 MMOL/L — LOW (ref 0.7–2)
LEUKOCYTE ESTERASE UR-ACNC: NEGATIVE — SIGNIFICANT CHANGE UP
LYMPHOCYTES # BLD AUTO: 0.9 K/UL — LOW (ref 1–3.3)
LYMPHOCYTES # BLD AUTO: 20.4 % — SIGNIFICANT CHANGE UP (ref 13–44)
MCHC RBC-ENTMCNC: 30.4 PG — SIGNIFICANT CHANGE UP (ref 27–34)
MCHC RBC-ENTMCNC: 32.5 GM/DL — SIGNIFICANT CHANGE UP (ref 32–36)
MCV RBC AUTO: 93.5 FL — SIGNIFICANT CHANGE UP (ref 80–100)
MONOCYTES # BLD AUTO: 0.3 K/UL — SIGNIFICANT CHANGE UP (ref 0–0.9)
MONOCYTES NFR BLD AUTO: 7.3 % — SIGNIFICANT CHANGE UP (ref 1–9)
NEUTROPHILS # BLD AUTO: 2.9 K/UL — SIGNIFICANT CHANGE UP (ref 1.8–7.4)
NEUTROPHILS NFR BLD AUTO: 68 % — SIGNIFICANT CHANGE UP (ref 43–77)
NITRITE UR-MCNC: NEGATIVE — SIGNIFICANT CHANGE UP
PH UR: 8 — SIGNIFICANT CHANGE UP (ref 5–8)
PLATELET # BLD AUTO: 202 K/UL — SIGNIFICANT CHANGE UP (ref 150–400)
POTASSIUM SERPL-MCNC: 4 MMOL/L — SIGNIFICANT CHANGE UP (ref 3.5–5.3)
POTASSIUM SERPL-SCNC: 4 MMOL/L — SIGNIFICANT CHANGE UP (ref 3.5–5.3)
PROT SERPL-MCNC: 5.8 G/DL — LOW (ref 6–8.3)
PROT UR-MCNC: 25 MG/DL
PROTHROM AB SERPL-ACNC: 11 SEC — SIGNIFICANT CHANGE UP (ref 9.8–12.7)
RBC # BLD: 3.18 M/UL — LOW (ref 3.8–5.2)
RBC # FLD: 15.1 % — HIGH (ref 10.3–14.5)
SODIUM SERPL-SCNC: 146 MMOL/L — HIGH (ref 135–145)
SP GR SPEC: 1.01 — SIGNIFICANT CHANGE UP (ref 1.01–1.02)
TROPONIN I SERPL-MCNC: 0.03 NG/ML — SIGNIFICANT CHANGE UP (ref 0.01–0.04)
UROBILINOGEN FLD QL: NEGATIVE — SIGNIFICANT CHANGE UP
WBC # BLD: 4.2 K/UL — SIGNIFICANT CHANGE UP (ref 3.8–10.5)
WBC # FLD AUTO: 4.2 K/UL — SIGNIFICANT CHANGE UP (ref 3.8–10.5)
WBC UR QL: SIGNIFICANT CHANGE UP

## 2018-03-30 PROCEDURE — 93010 ELECTROCARDIOGRAM REPORT: CPT

## 2018-03-30 PROCEDURE — 99285 EMERGENCY DEPT VISIT HI MDM: CPT

## 2018-03-30 PROCEDURE — 70450 CT HEAD/BRAIN W/O DYE: CPT | Mod: 26

## 2018-03-30 PROCEDURE — 71045 X-RAY EXAM CHEST 1 VIEW: CPT | Mod: 26

## 2018-03-30 PROCEDURE — 99223 1ST HOSP IP/OBS HIGH 75: CPT | Mod: AI,GC

## 2018-03-30 RX ORDER — SODIUM CHLORIDE 9 MG/ML
1000 INJECTION INTRAMUSCULAR; INTRAVENOUS; SUBCUTANEOUS
Qty: 0 | Refills: 0 | Status: COMPLETED | OUTPATIENT
Start: 2018-03-30 | End: 2018-03-30

## 2018-03-30 RX ORDER — ACETAMINOPHEN 500 MG
650 TABLET ORAL EVERY 6 HOURS
Qty: 0 | Refills: 0 | Status: DISCONTINUED | OUTPATIENT
Start: 2018-03-30 | End: 2018-04-03

## 2018-03-30 RX ORDER — DIGOXIN 250 MCG
0.12 TABLET ORAL DAILY
Qty: 0 | Refills: 0 | Status: DISCONTINUED | OUTPATIENT
Start: 2018-03-30 | End: 2018-04-03

## 2018-03-30 RX ORDER — RISPERIDONE 4 MG/1
0.5 TABLET ORAL
Qty: 0 | Refills: 0 | COMMUNITY

## 2018-03-30 RX ORDER — VALPROIC ACID (AS SODIUM SALT) 250 MG/5ML
500 SOLUTION, ORAL ORAL
Qty: 0 | Refills: 0 | Status: DISCONTINUED | OUTPATIENT
Start: 2018-03-30 | End: 2018-04-03

## 2018-03-30 RX ORDER — SENNA PLUS 8.6 MG/1
2 TABLET ORAL AT BEDTIME
Qty: 0 | Refills: 0 | Status: DISCONTINUED | OUTPATIENT
Start: 2018-03-30 | End: 2018-04-03

## 2018-03-30 RX ORDER — FAMOTIDINE 10 MG/ML
20 INJECTION INTRAVENOUS DAILY
Qty: 0 | Refills: 0 | Status: DISCONTINUED | OUTPATIENT
Start: 2018-03-30 | End: 2018-03-30

## 2018-03-30 RX ORDER — FUROSEMIDE 40 MG
20 TABLET ORAL
Qty: 0 | Refills: 0 | COMMUNITY

## 2018-03-30 RX ORDER — METOPROLOL TARTRATE 50 MG
25 TABLET ORAL THREE TIMES A DAY
Qty: 0 | Refills: 0 | Status: DISCONTINUED | OUTPATIENT
Start: 2018-03-30 | End: 2018-04-03

## 2018-03-30 RX ORDER — ASPIRIN/CALCIUM CARB/MAGNESIUM 324 MG
325 TABLET ORAL DAILY
Qty: 0 | Refills: 0 | Status: DISCONTINUED | OUTPATIENT
Start: 2018-03-30 | End: 2018-03-30

## 2018-03-30 RX ORDER — RISPERIDONE 4 MG/1
0.5 TABLET ORAL
Qty: 0 | Refills: 0 | Status: DISCONTINUED | OUTPATIENT
Start: 2018-03-30 | End: 2018-04-03

## 2018-03-30 RX ORDER — ENOXAPARIN SODIUM 100 MG/ML
30 INJECTION SUBCUTANEOUS EVERY 24 HOURS
Qty: 0 | Refills: 0 | Status: DISCONTINUED | OUTPATIENT
Start: 2018-03-30 | End: 2018-03-30

## 2018-03-30 RX ORDER — ACETAMINOPHEN 500 MG
20 TABLET ORAL
Qty: 0 | Refills: 0 | COMMUNITY

## 2018-03-30 RX ORDER — LISINOPRIL 2.5 MG/1
5 TABLET ORAL DAILY
Qty: 0 | Refills: 0 | Status: DISCONTINUED | OUTPATIENT
Start: 2018-03-30 | End: 2018-04-03

## 2018-03-30 RX ORDER — FUROSEMIDE 40 MG
20 TABLET ORAL
Qty: 0 | Refills: 0 | Status: DISCONTINUED | OUTPATIENT
Start: 2018-03-30 | End: 2018-04-03

## 2018-03-30 RX ORDER — PANTOPRAZOLE SODIUM 20 MG/1
40 TABLET, DELAYED RELEASE ORAL
Qty: 0 | Refills: 0 | Status: DISCONTINUED | OUTPATIENT
Start: 2018-03-30 | End: 2018-04-03

## 2018-03-30 RX ORDER — LATANOPROST 0.05 MG/ML
1 SOLUTION/ DROPS OPHTHALMIC; TOPICAL
Qty: 0 | Refills: 0 | COMMUNITY

## 2018-03-30 RX ORDER — FUROSEMIDE 40 MG
1 TABLET ORAL
Qty: 0 | Refills: 0 | COMMUNITY

## 2018-03-30 RX ORDER — VALPROIC ACID (AS SODIUM SALT) 250 MG/5ML
1000 SOLUTION, ORAL ORAL ONCE
Qty: 0 | Refills: 0 | Status: COMPLETED | OUTPATIENT
Start: 2018-03-30 | End: 2018-03-30

## 2018-03-30 RX ADMIN — Medication 20 MILLIGRAM(S): at 17:53

## 2018-03-30 RX ADMIN — Medication 500 MILLIGRAM(S): at 17:54

## 2018-03-30 RX ADMIN — PANTOPRAZOLE SODIUM 40 MILLIGRAM(S): 20 TABLET, DELAYED RELEASE ORAL at 17:53

## 2018-03-30 RX ADMIN — Medication 25 MILLIGRAM(S): at 15:13

## 2018-03-30 RX ADMIN — RISPERIDONE 0.5 MILLIGRAM(S): 4 TABLET ORAL at 17:54

## 2018-03-30 RX ADMIN — Medication 30 MILLIGRAM(S): at 12:19

## 2018-03-30 RX ADMIN — SODIUM CHLORIDE 1000 MILLILITER(S): 9 INJECTION INTRAMUSCULAR; INTRAVENOUS; SUBCUTANEOUS at 08:45

## 2018-03-30 RX ADMIN — SODIUM CHLORIDE 1000 MILLILITER(S): 9 INJECTION INTRAMUSCULAR; INTRAVENOUS; SUBCUTANEOUS at 10:00

## 2018-03-30 RX ADMIN — Medication 500 MILLIGRAM(S): at 13:41

## 2018-03-30 RX ADMIN — Medication 25 MILLIGRAM(S): at 22:04

## 2018-03-30 RX ADMIN — SENNA PLUS 2 TABLET(S): 8.6 TABLET ORAL at 22:04

## 2018-03-30 NOTE — H&P ADULT - NSHPPHYSICALEXAM_GEN_ALL_CORE
Physical Exam:  General: thin, awake but does not follow commands or respond in any way to verbal stimulation  HEENT: NCAT, EOMI bl, moist mucous membranes   Neck: Supple, nontender, no mass  Neurology: nonverbal, nonfocal, extremities are flexed but joints are mobile  Respiratory: CTA B/L, No W/R/R  CV: IRRR, +S1/S2, no murmurs, rubs or gallops  Abdominal: Soft, NT, ND +BSx4; PEG site visualized, no erythema/exudate  Extremities: No C/C/E, + peripheral pulses  MSK: no joint erythema or warmth, no joint swelling   Skin: warm, dry, normal color, no decubitus ulcers; scattered ecchymoses on upper left arm, right hip, right buttock.

## 2018-03-30 NOTE — H&P ADULT - PROBLEM SELECTOR PLAN 2
-chronic, rate controlled  -patient is not on anticoagulation at SNF; discussion with SNF staff indicates that pt is prone to climbing out of bed but is unable to walk. This is likely the reason for lack of AC at SNF but may be discussed further at time of discharge.  -monitor on tele  -continue home metoprolol, digoxin

## 2018-03-30 NOTE — H&P ADULT - ASSESSMENT
86y F from [facility] with PMH Afib (digoxin, metoprolol, not AC candidate), ?CKD, dementia (nonverbal, PEG tube placed 2/2018), constipation, HTN, CHF (EF 50% 2/2018) subdural hematoma, HLD, presents to [place] ED with 2 seizure episodes this morning of unclear etiology, admitted for EEG, seizure management. 86y F from [facility] with PMH Afib (digoxin, metoprolol, not AC candidate), ?CKD, dementia (nonverbal, PEG tube placed 2/2018), constipation, HTN, CHF (EF 50% 2/2018) subdural hematoma, HLD, presents to [place] ED with 2 seizure episodes this morning of unclear etiology, admitted for EEG, seizure evaluation/management.

## 2018-03-30 NOTE — ED PROVIDER NOTE - ENMT, MLM
Airway patent, Nasal mucosa clear. Mouth with normal mucosa. Throat has no vesicles, no oropharyngeal exudates and uvula is midline. no signs of head trauma.

## 2018-03-30 NOTE — ED PROVIDER NOTE - CONSTITUTIONAL, MLM
normal... Well appearing, well nourished, awake, looking around, non-verbal and in no apparent distress.

## 2018-03-30 NOTE — ED PROVIDER NOTE - PROGRESS NOTE DETAILS
Pt seen by Dr Sommer. He dw pts family as well. He will start meds, will await family to decide on disposition Kwasi pts family, pt still not fully back to baseline, agree with admit.  Kwasi Crane, accepts admit. Kwasi Sommer

## 2018-03-30 NOTE — ED PROVIDER NOTE - OBJECTIVE STATEMENT
87 yo min verbal F p/w had 2 episodes of sz at [facility] this am, reported short , lasting couple min. EMS was called and they witnessed 3rd sz lasting < 2 min. They subsequently gave Versed 5mg. No further sz activity. Pt now at reported nl baseline, min / usually non-verbal. No evid of pain. No known trauma. NO recent illness. no other hx avail.

## 2018-03-30 NOTE — H&P ADULT - PROBLEM SELECTOR PLAN 3
-chronic  -LVEF 50% 2/2018 with valve abnormalities and wall hypokinesis  -continue home metoprolol, dig, lisinopril, lasix  -no signs/symptoms of volume overload at this time

## 2018-03-30 NOTE — H&P ADULT - NSHPSOCIALHISTORY_GEN_ALL_CORE
Patient lives at [place].  She is largely bedbound but spends parts of day in wheelchair.    Tobacco: daughter states pt is never smoker  Occupation: retired   Alcohol: daughter denies  Drugs: daughter denies

## 2018-03-30 NOTE — H&P ADULT - HISTORY OF PRESENT ILLNESS
86y F from [facility] with PMH Afib (digoxin, metoprolol, not on AC), ?CKD, dementia (nonverbal, PEG tube placed 2/2018), constipation, HTN, CHF (EF 50% 2/2018), subdural hematoma, HLD, presents to [place] ED with 2 seizure episodes this morning.  History is limited as patient is non-verbal and daughter was not present for seizures.  Daughter was told seizures lasted "a couple" of minutes, and that a third episode occurred during transport to hospital, lasting <2min and treated with Versed 5mg.  No further seizure activity has been witnessed and per daughter patient is currently at baseline.  No signs of pain.  No known trauma or recent illness.  Unable to obtain [facility] 2/2 pt mental status.  Of note, patient was discharged from [place] 2/2018 after having a single seizure episode after which she was unable to speak/eat.  PEG tube placed at that time.  She was not placed on any epileptic medications at discharge.    ED Course:   Vitals on presentation: T97.4F (skin), HR 73, /67, RR 18, SpO2 100% on RA.  Labs: WBC 4.2, Hg 9.7, Na 146, Cl 111, Total protein 5.8, albumin 2.7.  Tani (-) x1.  UA (-).  Dig level WNL. EKG: Afib @ 57bpm.  CT head: no acute intracranial pathology.  Chronic infarcts in right occipital lobe, left posterior parietal lobe similar to prior.  Multifocal chronic lacunar infarcts in b/l basal ganglia and thalamus similar to prior.  No acute infarct/hemorrhage. B/l subdural hygromas/chronic subdural hematomas over the convexities similar to prior.  CXR: marked cardiomegaly.  No active pleural-parenchymal process.  Pt was given 1l NS bolus x1, valproate 1g IV x1.  Blood/urine cultures collected.  Seen by Dr. Sommer in ED.

## 2018-03-30 NOTE — H&P ADULT - PROBLEM SELECTOR PLAN 7
-pt is currently at baseline  -continue home risperidone   -per nursing home, pt has hx of agitation and trying to climb out of bed.  will need additional supervisions if these behaviors recur -pt is currently at baseline  -continue home risperidone   -per nursing home, pt has hx of agitation and trying to climb out of bed.  will need additional supervisions if these behaviors recur  -q2h repositioning

## 2018-03-30 NOTE — H&P ADULT - PROBLEM SELECTOR PLAN 1
-admit to telemetry  -unclear etiology; pt has hx of solitary seizure ~6wks ago not treated with long-term seizure meds.  CT at admission suggests multiple chronic cerebral infarcts, which may be the etiology of the current episode.   -CBC/BMP grossly nl at admission, dig level wnl  -s/p valproic acid syrup in ED  -continue valproic acid per neuro (Dr. Sommer) recs  -EEG ordered per neuro; f/u results  -seizure, fall, aspiration precautions  -am labs; f/u blood, urine cxs  -pt is DNR/DNI; MOLST is in chart

## 2018-03-30 NOTE — H&P ADULT - ATTENDING COMMENTS
Pt seen and examined. Will get EEG eval and MRI once EEG is done  Pt hgb is <10 and I held aspirin and lovenox and started protonix 40mg iv bid . will check cbc in am. stool occult ordered

## 2018-03-30 NOTE — ED ADULT NURSE NOTE - OBJECTIVE STATEMENT
Nursing home patient to ED s/p witnessed seizure x 2 this morning, pt has history of febrile seizures, rectal temp on arrival = 98.9, pt given 5mg versed in the field, pt has dementia and facility reports pt is verbal but has dementia and does not ambulate, bruises noted on left upper arm and right lower leg, peg tube noted on arrival will continue to monitor

## 2018-03-30 NOTE — H&P ADULT - PROBLEM SELECTOR PLAN 8
DVT PPx: lovenox sq daily (renal dosing)  IMPROVE VTE Individual Risk Assessment          RISK       Points    [  ] Previous VTE 3  [  ] Thrombophilia  2  [  ] Lower limb paralysis  2        (unable to hold up >15 seconds)    [  ] Current Cancer    2         (within 6 months)  [  x] Immobilization > 24 hrs 1  [  ] ICU/CCU stay > 24 hours   1  [  x] Age > 60  1  IMPROVE VTE Score: 2

## 2018-03-31 DIAGNOSIS — D64.9 ANEMIA, UNSPECIFIED: ICD-10-CM

## 2018-03-31 LAB
ALBUMIN SERPL ELPH-MCNC: 2.9 G/DL — LOW (ref 3.3–5)
ALP SERPL-CCNC: 75 U/L — SIGNIFICANT CHANGE UP (ref 40–120)
ALT FLD-CCNC: 16 U/L — SIGNIFICANT CHANGE UP (ref 12–78)
ANION GAP SERPL CALC-SCNC: 7 MMOL/L — SIGNIFICANT CHANGE UP (ref 5–17)
AST SERPL-CCNC: 18 U/L — SIGNIFICANT CHANGE UP (ref 15–37)
BASOPHILS # BLD AUTO: 0.1 K/UL — SIGNIFICANT CHANGE UP (ref 0–0.2)
BASOPHILS NFR BLD AUTO: 1.4 % — SIGNIFICANT CHANGE UP (ref 0–2)
BILIRUB SERPL-MCNC: 0.5 MG/DL — SIGNIFICANT CHANGE UP (ref 0.2–1.2)
BUN SERPL-MCNC: 19 MG/DL — SIGNIFICANT CHANGE UP (ref 7–23)
CALCIUM SERPL-MCNC: 8.3 MG/DL — LOW (ref 8.5–10.1)
CHLORIDE SERPL-SCNC: 104 MMOL/L — SIGNIFICANT CHANGE UP (ref 96–108)
CO2 SERPL-SCNC: 30 MMOL/L — SIGNIFICANT CHANGE UP (ref 22–31)
CREAT SERPL-MCNC: 0.83 MG/DL — SIGNIFICANT CHANGE UP (ref 0.5–1.3)
CULTURE RESULTS: NO GROWTH — SIGNIFICANT CHANGE UP
EOSINOPHIL # BLD AUTO: 0.1 K/UL — SIGNIFICANT CHANGE UP (ref 0–0.5)
EOSINOPHIL NFR BLD AUTO: 1.5 % — SIGNIFICANT CHANGE UP (ref 0–6)
GLUCOSE SERPL-MCNC: 108 MG/DL — HIGH (ref 70–99)
HCT VFR BLD CALC: 29.6 % — LOW (ref 34.5–45)
HGB BLD-MCNC: 9.8 G/DL — LOW (ref 11.5–15.5)
LYMPHOCYTES # BLD AUTO: 1.3 K/UL — SIGNIFICANT CHANGE UP (ref 1–3.3)
LYMPHOCYTES # BLD AUTO: 20.1 % — SIGNIFICANT CHANGE UP (ref 13–44)
MCHC RBC-ENTMCNC: 30.8 PG — SIGNIFICANT CHANGE UP (ref 27–34)
MCHC RBC-ENTMCNC: 33.2 GM/DL — SIGNIFICANT CHANGE UP (ref 32–36)
MCV RBC AUTO: 92.9 FL — SIGNIFICANT CHANGE UP (ref 80–100)
MONOCYTES # BLD AUTO: 0.5 K/UL — SIGNIFICANT CHANGE UP (ref 0–0.9)
MONOCYTES NFR BLD AUTO: 8.1 % — SIGNIFICANT CHANGE UP (ref 1–9)
NEUTROPHILS # BLD AUTO: 4.6 K/UL — SIGNIFICANT CHANGE UP (ref 1.8–7.4)
NEUTROPHILS NFR BLD AUTO: 68.9 % — SIGNIFICANT CHANGE UP (ref 43–77)
PLATELET # BLD AUTO: 201 K/UL — SIGNIFICANT CHANGE UP (ref 150–400)
POTASSIUM SERPL-MCNC: 3.5 MMOL/L — SIGNIFICANT CHANGE UP (ref 3.5–5.3)
POTASSIUM SERPL-SCNC: 3.5 MMOL/L — SIGNIFICANT CHANGE UP (ref 3.5–5.3)
PROT SERPL-MCNC: 6.4 G/DL — SIGNIFICANT CHANGE UP (ref 6–8.3)
RBC # BLD: 3.19 M/UL — LOW (ref 3.8–5.2)
RBC # FLD: 14.4 % — SIGNIFICANT CHANGE UP (ref 10.3–14.5)
SODIUM SERPL-SCNC: 141 MMOL/L — SIGNIFICANT CHANGE UP (ref 135–145)
SPECIMEN SOURCE: SIGNIFICANT CHANGE UP
WBC # BLD: 6.6 K/UL — SIGNIFICANT CHANGE UP (ref 3.8–10.5)
WBC # FLD AUTO: 6.6 K/UL — SIGNIFICANT CHANGE UP (ref 3.8–10.5)

## 2018-03-31 PROCEDURE — 99233 SBSQ HOSP IP/OBS HIGH 50: CPT

## 2018-03-31 PROCEDURE — 99223 1ST HOSP IP/OBS HIGH 75: CPT

## 2018-03-31 PROCEDURE — 70551 MRI BRAIN STEM W/O DYE: CPT | Mod: 26

## 2018-03-31 RX ORDER — CLOPIDOGREL BISULFATE 75 MG/1
75 TABLET, FILM COATED ORAL DAILY
Qty: 0 | Refills: 0 | Status: DISCONTINUED | OUTPATIENT
Start: 2018-03-31 | End: 2018-04-03

## 2018-03-31 RX ORDER — ASPIRIN/CALCIUM CARB/MAGNESIUM 324 MG
325 TABLET ORAL ONCE
Qty: 0 | Refills: 0 | Status: COMPLETED | OUTPATIENT
Start: 2018-03-31 | End: 2018-03-31

## 2018-03-31 RX ADMIN — Medication 325 MILLIGRAM(S): at 10:55

## 2018-03-31 RX ADMIN — Medication 25 MILLIGRAM(S): at 06:06

## 2018-03-31 RX ADMIN — LISINOPRIL 5 MILLIGRAM(S): 2.5 TABLET ORAL at 06:06

## 2018-03-31 RX ADMIN — CLOPIDOGREL BISULFATE 75 MILLIGRAM(S): 75 TABLET, FILM COATED ORAL at 16:34

## 2018-03-31 RX ADMIN — Medication 500 MILLIGRAM(S): at 17:32

## 2018-03-31 RX ADMIN — PANTOPRAZOLE SODIUM 40 MILLIGRAM(S): 20 TABLET, DELAYED RELEASE ORAL at 06:06

## 2018-03-31 RX ADMIN — Medication 1 TABLET(S): at 11:07

## 2018-03-31 RX ADMIN — Medication 25 MILLIGRAM(S): at 16:34

## 2018-03-31 RX ADMIN — Medication 500 MILLIGRAM(S): at 06:06

## 2018-03-31 RX ADMIN — RISPERIDONE 0.5 MILLIGRAM(S): 4 TABLET ORAL at 06:06

## 2018-03-31 RX ADMIN — RISPERIDONE 0.5 MILLIGRAM(S): 4 TABLET ORAL at 17:32

## 2018-03-31 RX ADMIN — PANTOPRAZOLE SODIUM 40 MILLIGRAM(S): 20 TABLET, DELAYED RELEASE ORAL at 17:32

## 2018-03-31 RX ADMIN — Medication 0.12 MILLIGRAM(S): at 06:06

## 2018-03-31 NOTE — CHART NOTE - NSCHARTNOTEFT_GEN_A_CORE
Spoke w/ Neuro. MR shows acute L sided CVA, will start plavix and d/c asa. Check a1c, check lipid panel, PT consult. Spoke w/ Neuro. MR shows acute L sided CVA, will start plavix and d/c asa. Check a1c, check lipid panel, PT consult, carotid doppler. consulted cardio, Savella as well.   reviewed recent echo. add neuro checks. aspiration precautions. nursing protocols for acute cva ordered as well.

## 2018-03-31 NOTE — CHART NOTE - NSCHARTNOTEFT_GEN_A_CORE
RR was called for Pt with acute change in level of consciousness.   Pt was previously noted to be able to speak to family verbally. Pt was minimally responding to physical and verbal stimuli.   Pt did not have any seizure like activity.      ROS:  Unable to attain.    RR Vitals: 150/80 HR 96 RR 16 T 97.6 Sat 97 % on 2L NC, Blood Sugar 99    Vital Signs Last 24 Hrs  T(C): 37.1 (31 Mar 2018 12:43), Max: 37.7 (31 Mar 2018 05:16)  T(F): 98.7 (31 Mar 2018 12:43), Max: 99.8 (31 Mar 2018 05:16)  HR: 60 (31 Mar 2018 12:43) (60 - 91)  BP: 123/79 (31 Mar 2018 12:43) (123/79 - 178/84)  BP(mean): --  RR: 18 (31 Mar 2018 12:43) (17 - 20)  SpO2: 100% (31 Mar 2018 12:43) (96% - 100%)    PHYSICAL EXAM:  General: Thin frail elderly, NAD  HEENT: PERRL, dry mucous membranes   Neurology: Awake, Alert, non-verbal, nonfocal (unable to assess full neurological status due to limited pt response)   Respiratory: CTA B/L, No wheezes, rales, rhonchi  CV: Irregular, +S1/S2, no murmurs  Abdominal: Soft, NT, ND +BS  Extremities: No clubbing, cyanosis, edema; + peripheral pulses               9.8    6.6   )-----------( 201      ( 31 Mar 2018 05:11 )             29.6     03-31    141  |  104  |  19  ----------------------------<  108<H>  3.5   |  30  |  0.83    Ca    8.3<L>      31 Mar 2018 05:11    TPro  6.4  /  Alb  2.9<L>  /  TBili  0.5  /  DBili  x   /  AST  18  /  ALT  16  /  AlkPhos  75  03-31    PT/INR - ( 30 Mar 2018 08:54 )   PT: 11.0 sec;   INR: 1.01 ratio         PTT - ( 30 Mar 2018 08:54 )  PTT:30.7 sec  Urinalysis Basic - ( 30 Mar 2018 08:54 )    Color: Yellow / Appearance: Clear / S.010 / pH: x  Gluc: x / Ketone: Negative  / Bili: Negative / Urobili: Negative   Blood: x / Protein: 25 mg/dL / Nitrite: Negative   Leuk Esterase: Negative / RBC: x / WBC 0-2   Sq Epi: x / Non Sq Epi: Occasional / Bacteria: x      A/P:  86y F from [facility] with PMH Afib (digoxin, metoprolol, not AC candidate), ?CKD, dementia (nonverbal, PEG tube placed 2018), constipation, HTN, CHF (EF 50% 2018) subdural hematoma, HLD, presents to [place] ED with 2 seizure episodes this morning of unclear etiology, admitted for EEG, seizure evaluation/management   -Pt now has worsening level of consciousness likely at new baseline in setting of acute L sided CVA on MR  -Neuro recently started Depakene 500 BID, ativan prn, for seizures   -continue to monitor neurological status,   -seizure, fall, aspiration precautions  -continue Plavix,   -F/U A1c, cardotid dopplers, lipid panel, PT recs  -Dr. Sommer notified, agrees with plan  -Dr. Beeyr attending at bedside  -RR team and ICU attending presented at bed side,  -RN to notify if any chnages

## 2018-03-31 NOTE — CHART NOTE - NSCHARTNOTEFT_GEN_A_CORE
RR Follow up:  Pt seen and exam at bedside. Pt neurological status has unchanged. Pt is minimally responsive to physical verbal, and painful stimuli.     ROS:  Unable to attain    Vital Signs Last 24 Hrs  T(C): 37.1 (31 Mar 2018 12:43), Max: 37.7 (31 Mar 2018 05:16)  T(F): 98.7 (31 Mar 2018 12:43), Max: 99.8 (31 Mar 2018 05:16)  HR: 60 (31 Mar 2018 12:43) (60 - 91)  BP: 123/79 (31 Mar 2018 12:43) (123/79 - 178/84)  BP(mean): --  RR: 18 (31 Mar 2018 12:43) (17 - 20)  SpO2: 100% (31 Mar 2018 12:43) (96% - 100%)    PHYSICAL EXAM:  General: Thin frail elderly, NAD  HEENT: PERRL, dry mucous membranes   Neurology: Awake, Alert, non-verbal, nonfocal (unable to assess full neurological status due to limited pt response)   Respiratory: CTA B/L, No wheezes, rales, rhonchi  CV: Irregular, +S1/S2, no murmurs  Abdominal: Soft, NT, ND +BS  Extremities: No clubbing, cyanosis, edema; + peripheral pulses                        9.8    6.6   )-----------( 201      ( 31 Mar 2018 05:11 )             29.6     03-31    141  |  104  |  19  ----------------------------<  108<H>  3.5   |  30  |  0.83    Ca    8.3<L>      31 Mar 2018 05:11    TPro  6.4  /  Alb  2.9<L>  /  TBili  0.5  /  DBili  x   /  AST  18  /  ALT  16  /  AlkPhos  75  03-31    PT/INR - ( 30 Mar 2018 08:54 )   PT: 11.0 sec;   INR: 1.01 ratio         PTT - ( 30 Mar 2018 08:54 )  PTT:30.7 sec  Urinalysis Basic - ( 30 Mar 2018 08:54 )    Color: Yellow / Appearance: Clear / S.010 / pH: x  Gluc: x / Ketone: Negative  / Bili: Negative / Urobili: Negative   Blood: x / Protein: 25 mg/dL / Nitrite: Negative   Leuk Esterase: Negative / RBC: x / WBC 0-2   Sq Epi: x / Non Sq Epi: Occasional / Bacteria: x    A/P:  86y F from [facility] with PMH Afib (digoxin, metoprolol, not AC candidate), ?CKD, dementia (nonverbal, PEG tube placed 2018), constipation, HTN, CHF (EF 50% 2018) subdural hematoma, HLD, presents to [place] ED with 2 seizure episodes this morning of unclear etiology, admitted with seizures, AMS, new CVA  -Pt now has worsening level of consciousness likely at new baseline in setting of acute L sided CVA on MR  -Neuro recently started Depakene 500 BID, ativan prn, for seizures   -continue to monitor any changes neurological status,   -seizure, fall, aspiration precautions  -continue Plavix, f/u labs  -RN to notify if any changes

## 2018-03-31 NOTE — PROGRESS NOTE ADULT - SUBJECTIVE AND OBJECTIVE BOX
Patient is a 86y old  Female who presents with a chief complaint of seizure (30 Mar 2018 12:46)      HPI:  86y F from [facility] with PMH Afib (digoxin, metoprolol, not on AC), ?CKD, dementia (nonverbal, PEG tube placed 2018), constipation, HTN, CHF (EF 50% 2018), subdural hematoma, HLD, presents to [place] ED with 2 seizure episodes this morning.  History is limited as patient is non-verbal and daughter was not present for seizures.  Daughter was told seizures lasted "a couple" of minutes, and that a third episode occurred during transport to hospital, lasting <2min and treated with Versed 5mg.  No further seizure activity has been witnessed and per daughter patient is currently at baseline.  No signs of pain.  No known trauma or recent illness.  Unable to obtain [facility]  pt mental status.  Of note, patient was discharged from [place] 2018 after having a single seizure episode after which she was unable to speak/eat.  PEG tube placed at that time.  She was not placed on any epileptic medications at discharge.    ED Course:   Vitals on presentation: T97.4F (skin), HR 73, /67, RR 18, SpO2 100% on RA.  Labs: WBC 4.2, Hg 9.7, Na 146, Cl 111, Total protein 5.8, albumin 2.7.  Tani (-) x1.  UA (-).  Dig level WNL. EKG: Afib @ 57bpm.  CT head: no acute intracranial pathology.  Chronic infarcts in right occipital lobe, left posterior parietal lobe similar to prior.  Multifocal chronic lacunar infarcts in b/l basal ganglia and thalamus similar to prior.  No acute infarct/hemorrhage. B/l subdural hygromas/chronic subdural hematomas over the convexities similar to prior.  CXR: marked cardiomegaly.  No active pleural-parenchymal process.  Pt was given 1l NS bolus x1, valproate 1g IV x1.  Blood/urine cultures collected.  Seen by Dr. Sommer in ED. (30 Mar 2018 12:46)      INTERVAL HPI/OVERNIGHT EVENTS: Pt seen and evaluated at the bedside. No acute overnight events occurred. No further seizure activity. Pt is essentially nonverbal this morning, she is intermittently expressing some discomfort but she is not following commands and she is not making eye contact w/ me.     Pt's daughter is at the bedside and she speaks fluent [place]. She has been trying to speak w/ the pt's daughter and she is not noticing any responses to verbal stimuli. Telephone  would not be necessary at this time as pt is not communicating w/ family members.     TELEMETRY: no events. sinus vazquez in 50's      T(C): 37 (18 @ 08:02), Max: 37.7 (18 @ 05:16)  HR: 66 (18 @ 08:02) (62 - 91)  BP: 137/79 (18 @ 08:02) (137/79 - 178/84)  RR: 18 (18 @ 08:02) (17 - 20)  SpO2: 97% (18 @ 08:02) (96% - 100%)  Wt(kg): --  I&O's Summary    30 Mar 2018 07:01  -  31 Mar 2018 07:00  --------------------------------------------------------  IN: 500 mL / OUT: 0 mL / NET: 500 mL        REVIEW OF SYSTEMS:  unable to obtain ROS    PHYSICAL EXAM:  GENERAL: patient appears well, no acute distress, appropriate  EYES: sclera clear, no exudates, EOMI, PERRL  ENMT: oropharynx clear without erythema, no exudates, moist mucous membranes  NECK: supple, soft, no thyromegaly noted  LUNGS: good air entry bilaterally, clear to auscultation, symmetric breath sounds, no wheezing or rhonchi appreciated  HEART: soft S1/S2, irregular rhythm, bradycardic, no murmurs noted, no lower extremity edema  GASTROINTESTINAL: abdomen is soft, nontender, nondistended, normoactive bowel sounds, no palpable masses  INTEGUMENT: good skin turgor, no lesions noted  MUSCULOSKELETAL: no clubbing or cyanosis, no obvious deformity  NEUROLOGIC: appears sedated, unable to assess motor strength as she is not following commands, responsive to noxious stimuli  PSYCHIATRIC: unable to assess  HEME/LYMPH: no palpable supraclavicular nodules, no obvious ecchymosis or petechiae     MEDICATIONS  (STANDING):  calcium carbonate 1250 mG + Vitamin D (OsCal 500 + D) 1 Tablet(s) Oral daily  digoxin     Tablet 0.125 milliGRAM(s) Oral daily  furosemide Solution 20 milliGRAM(s) Oral <User Schedule>  lisinopril 5 milliGRAM(s) Oral daily  metoprolol tartrate 25 milliGRAM(s) Oral three times a day  pantoprazole  Injectable 40 milliGRAM(s) IV Push two times a day  risperiDONE   Tablet 0.5 milliGRAM(s) Oral two times a day  senna 2 Tablet(s) Oral at bedtime  valproic  acid Syrup 500 milliGRAM(s) Oral two times a day    MEDICATIONS  (PRN):  acetaminophen   Tablet. 650 milliGRAM(s) Oral every 6 hours PRN Moderate Pain (4 - 6)      LABS:                        9.8    6.6   )-----------( 201      ( 31 Mar 2018 05:11 )             29.6     03-31    141  |  104  |  19  ----------------------------<  108<H>  3.5   |  30  |  0.83    Ca    8.3<L>      31 Mar 2018 05:11    TPro  6.4  /  Alb  2.9<L>  /  TBili  0.5  /  DBili  x   /  AST  18  /  ALT  16  /  AlkPhos  75  03-31    PT/INR - ( 30 Mar 2018 08:54 )   PT: 11.0 sec;   INR: 1.01 ratio         PTT - ( 30 Mar 2018 08:54 )  PTT:30.7 sec  Urinalysis Basic - ( 30 Mar 2018 08:54 )    Color: Yellow / Appearance: Clear / S.010 / pH: x  Gluc: x / Ketone: Negative  / Bili: Negative / Urobili: Negative   Blood: x / Protein: 25 mg/dL / Nitrite: Negative   Leuk Esterase: Negative / RBC: x / WBC 0-2   Sq Epi: x / Non Sq Epi: Occasional / Bacteria: x

## 2018-03-31 NOTE — CONSULT NOTE ADULT - SUBJECTIVE AND OBJECTIVE BOX
[facility] - Radha Gallagher, Gil, Paulina, Christine, Lynda Trevino  Office Number: [phone]    Initial Consult Note    CHIEF COMPLAINT: Patient is a 86y old  Female who presents with a chief complaint of seizure (30 Mar 2018 12:46)      HPI:  86y F from [facility] with PMH Afib (digoxin, metoprolol, not on AC), ?CKD, dementia (nonverbal, PEG tube placed 2/2018), constipation, HTN, CHF (EF 50% 2/2018), subdural hematoma, HLD, presents to [place] ED with 2 seizure episodes this morning.  History is limited as patient is non-verbal and daughter was not present for seizures.  Daughter was told seizures lasted "a couple" of minutes, and that a third episode occurred during transport to hospital, lasting <2min and treated with Versed 5mg.  No further seizure activity has been witnessed and per daughter patient is currently at baseline.  No signs of pain.  No known trauma or recent illness.  Unable to obtain [facility] 2/2 pt mental status.  Of note, patient was discharged from [place] 2/2018 after having a single seizure episode after which she was unable to speak/eat.  PEG tube placed at that time.  She was not placed on any epileptic medications at discharge.    ED Course:   Vitals on presentation: T97.4F (skin), HR 73, /67, RR 18, SpO2 100% on RA.  Labs: WBC 4.2, Hg 9.7, Na 146, Cl 111, Total protein 5.8, albumin 2.7.  Tani (-) x1.  UA (-).  Dig level WNL. EKG: Afib @ 57bpm.  CT head: no acute intracranial pathology.  Chronic infarcts in right occipital lobe, left posterior parietal lobe similar to prior.  Multifocal chronic lacunar infarcts in b/l basal ganglia and thalamus similar to prior.  No acute infarct/hemorrhage. B/l subdural hygromas/chronic subdural hematomas over the convexities similar to prior.  CXR: marked cardiomegaly.  No active pleural-parenchymal process.  Pt was given 1l NS bolus x1, valproate 1g IV x1.  Blood/urine cultures collected.  Seen by Dr. Sommer in ED. (30 Mar 2018 12:46)    She was last seen at [facility] in 2/2018. Briefly, she is an 86 year old woman with atrial fibrillation (not chronically anticoagulated due to subdural hematoma), non-ischemic cardiomyopathy, moderate aortic insufficiency, medication-nonadherence, MDS, CKD, admitted 2/6/18 to [place] with seizures; brain imaging reveals areas of CVA.  Mild LV dysfunction on prior echo. Unable to obtain history.    PAST MEDICAL & SURGICAL HISTORY:  RA (rheumatoid arthritis)  Constipation  Generalized anxiety disorder  Dry eye  Chronic pain  Chronic kidney disease: stage 3 moderate  Iron deficiency anemia  GERD (gastroesophageal reflux disease)  Dementia  HTN (hypertension)  Atrial fibrillation  Heart failure  Hyperlipidemia  Osteoarthritis  Subdural hematoma  Atrial fibrillation  Age related osteoporosis  HTN (hypertension)  S/P percutaneous endoscopic gastrostomy (PEG) tube placement      SOCIAL HISTORY:  Unable to assess    FAMILY HISTORY:  Unable to obtain    MEDICATIONS  (STANDING):  calcium carbonate 1250 mG + Vitamin D (OsCal 500 + D) 1 Tablet(s) Oral daily  clopidogrel Tablet 75 milliGRAM(s) Oral daily  digoxin     Tablet 0.125 milliGRAM(s) Oral daily  furosemide Solution 20 milliGRAM(s) Oral <User Schedule>  lisinopril 5 milliGRAM(s) Oral daily  metoprolol tartrate 25 milliGRAM(s) Oral three times a day  pantoprazole  Injectable 40 milliGRAM(s) IV Push two times a day  risperiDONE   Tablet 0.5 milliGRAM(s) Oral two times a day  senna 2 Tablet(s) Oral at bedtime  valproic  acid Syrup 500 milliGRAM(s) Oral two times a day    MEDICATIONS  (PRN):  acetaminophen   Tablet. 650 milliGRAM(s) Oral every 6 hours PRN Moderate Pain (4 - 6)      Allergies    No Known Allergies    Intolerances        REVIEW OF SYSTEMS:    Unable to obtain accurate ROS    VITAL SIGNS:   Vital Signs Last 24 Hrs  T(C): 37.1 (31 Mar 2018 12:43), Max: 37.7 (31 Mar 2018 05:16)  T(F): 98.7 (31 Mar 2018 12:43), Max: 99.8 (31 Mar 2018 05:16)  HR: 60 (31 Mar 2018 12:43) (60 - 91)  BP: 123/79 (31 Mar 2018 12:43) (123/79 - 178/84)  BP(mean): --  RR: 18 (31 Mar 2018 12:43) (17 - 20)  SpO2: 100% (31 Mar 2018 12:43) (96% - 100%)    I&O's Summary    30 Mar 2018 07:01  -  31 Mar 2018 07:00  --------------------------------------------------------  IN: 500 mL / OUT: 0 mL / NET: 500 mL        On Exam:    Constitutional: NAD, unarousable to sternal rub  Lungs:  Non-labored, breath sounds are clear bilaterally, No wheezing, rales or rhonchi  Cardiovascular: irregular.  S1 and S2 positive.  No murmurs, rubs, gallops or clicks  Gastrointestinal: Bowel Sounds present, soft, nontender, PEG in place  Lymph: No peripheral edema. No cervical lymphadenopathy.  Neurological: Unable to assess  Skin: No rashes or ulcers   Psych: Unable to assess    LABS: All Labs Reviewed:                        9.8    6.6   )-----------( 201      ( 31 Mar 2018 05:11 )             29.6                         9.7    4.2   )-----------( 202      ( 30 Mar 2018 08:54 )             29.8     31 Mar 2018 05:11    141    |  104    |  19     ----------------------------<  108    3.5     |  30     |  0.83   30 Mar 2018 08:54    146    |  111    |  22     ----------------------------<  88     4.0     |  27     |  0.84     Ca    8.3        31 Mar 2018 05:11  Ca    8.0        30 Mar 2018 08:54    TPro  6.4    /  Alb  2.9    /  TBili  0.5    /  DBili  x      /  AST  18     /  ALT  16     /  AlkPhos  75     31 Mar 2018 05:11  TPro  5.8    /  Alb  2.7    /  TBili  0.4    /  DBili  x      /  AST  18     /  ALT  18     /  AlkPhos  71     30 Mar 2018 08:54    PT/INR - ( 30 Mar 2018 08:54 )   PT: 11.0 sec;   INR: 1.01 ratio         PTT - ( 30 Mar 2018 08:54 )  PTT:30.7 sec  CARDIAC MARKERS ( 30 Mar 2018 08:54 )  .033 ng/mL / x     / 59 U/L / x     / 1.7 ng/mL      Blood Culture: Organism --  Gram Stain Blood -- Gram Stain --  Specimen Source .Blood Blood-Peripheral  Culture-Blood --    Organism --  Gram Stain Blood -- Gram Stain --  Specimen Source .Urine Catheterized  Culture-Blood --            RADIOLOGY:    EKG: AF with slow response at 57 bpm, septal q waves and lvh

## 2018-03-31 NOTE — PROGRESS NOTE ADULT - SUBJECTIVE AND OBJECTIVE BOX
Neurology follow up note    MARIA DEL ROSARIO [id number]      Interval History:    Patient resting in bed     MEDICATIONS    acetaminophen   Tablet. 650 milliGRAM(s) Oral every 6 hours PRN  aspirin 325 milliGRAM(s) Oral once  calcium carbonate 1250 mG + Vitamin D (OsCal 500 + D) 1 Tablet(s) Oral daily  digoxin     Tablet 0.125 milliGRAM(s) Oral daily  furosemide Solution 20 milliGRAM(s) Oral <User Schedule>  lisinopril 5 milliGRAM(s) Oral daily  metoprolol tartrate 25 milliGRAM(s) Oral three times a day  pantoprazole  Injectable 40 milliGRAM(s) IV Push two times a day  risperiDONE   Tablet 0.5 milliGRAM(s) Oral two times a day  senna 2 Tablet(s) Oral at bedtime  valproic  acid Syrup 500 milliGRAM(s) Oral two times a day      Allergies    No Known Allergies    Intolerances            Vital Signs Last 24 Hrs  T(C): 37 (31 Mar 2018 08:02), Max: 37.7 (31 Mar 2018 05:16)  T(F): 98.6 (31 Mar 2018 08:02), Max: 99.8 (31 Mar 2018 05:16)  HR: 66 (31 Mar 2018 08:02) (62 - 91)  BP: 137/79 (31 Mar 2018 08:02) (137/79 - 178/84)  BP(mean): --  RR: 18 (31 Mar 2018 08:02) (17 - 20)  SpO2: 97% (31 Mar 2018 08:02) (96% - 100%)      REVIEW OF SYSTEMS: Non Verbal  Limited or unable to obtain secondary to patient's poor mental status.      On Neurological Examination: awake in bed.      Pupils bilaterally were 2 mm, slightly reactive.  The patient appears to have signs of cataract in her eyes.      Speech moan and cries     The patient does not follow any commands.     Motor:  I attempted to elevate bilateral upper extremities, the patient would actively resist, pull both arms down, would say overall was 3/5. but right arm did appear weaker today  Bilateral lower extremities with plantar stimulation, positive flexation and withdrawal to hip and knee.      GENERAL Exam: Nontoxic , No Acute Distress   	  HEENT:  normocephalic, atraumatic  		  LUNGS:   Decreased bilaterally  	  HEART: Normal S1S2   No murmur RRR        	  GI/ ABDOMEN:  Soft  Non tender    EXTREMITIES:   No Edema  No Clubbing  No Cyanosis No Edema    MUSCULOSKELETAL: decreased Range of Motion all 4 extremities   	   SKIN: Normal  No Ecchymosis               LABS:  CBC Full  -  ( 31 Mar 2018 05:11 )  WBC Count : 6.6 K/uL  Hemoglobin : 9.8 g/dL  Hematocrit : 29.6 %  Platelet Count - Automated : 201 K/uL  Mean Cell Volume : 92.9 fl  Mean Cell Hemoglobin : 30.8 pg  Mean Cell Hemoglobin Concentration : 33.2 gm/dL  Auto Neutrophil # : 4.6 K/uL  Auto Lymphocyte # : 1.3 K/uL  Auto Monocyte # : 0.5 K/uL  Auto Eosinophil # : 0.1 K/uL  Auto Basophil # : 0.1 K/uL  Auto Neutrophil % : 68.9 %  Auto Lymphocyte % : 20.1 %  Auto Monocyte % : 8.1 %  Auto Eosinophil % : 1.5 %  Auto Basophil % : 1.4 %    Urinalysis Basic - ( 30 Mar 2018 08:54 )    Color: Yellow / Appearance: Clear / S.010 / pH: x  Gluc: x / Ketone: Negative  / Bili: Negative / Urobili: Negative   Blood: x / Protein: 25 mg/dL / Nitrite: Negative   Leuk Esterase: Negative / RBC: x / WBC 0-2   Sq Epi: x / Non Sq Epi: Occasional / Bacteria: x          141  |  104  |  19  ----------------------------<  108<H>  3.5   |  30  |  0.83    Ca    8.3<L>      31 Mar 2018 05:11    TPro  6.4  /  Alb  2.9<L>  /  TBili  0.5  /  DBili  x   /  AST  18  /  ALT  16  /  AlkPhos  75  03-31    Hemoglobin A1C:     LIVER FUNCTIONS - ( 31 Mar 2018 05:11 )  Alb: 2.9 g/dL / Pro: 6.4 g/dL / ALK PHOS: 75 U/L / ALT: 16 U/L / AST: 18 U/L / GGT: x           Vitamin B12   PT/INR - ( 30 Mar 2018 08:54 )   PT: 11.0 sec;   INR: 1.01 ratio         PTT - ( 30 Mar 2018 08:54 )  PTT:30.7 sec      RADIOLOGY    ANALYSIS AND PLAN:  An 86-year-old with a history of cerebrovascular accident, dementia, and seizure.  1.	For status epilepticus, the patient's risk factors for seizures could be a history of old strokes and also dementia.  2.	We will give the patient Depakene started on 500 twice a day.  3.	I would recommend Ativan 1 mg IV push q.6h. p.r.n. for any type of breakthrough seizure.  4.	Seizure precautions.  5.	For history of dementia secondary to the patient's age and appeared to be advanced, supportive therapy.  6.	For history of cerebrovascular accident, it appeared that the patient may have a history of AFib, but also has a history of subdural hematoma.  The patient is currently on aspirin, will continue that. She understands risk vs benefits.  Today I did note right sided weakness does have risk factors for CVA will check MRI   7.	Spoke with daughter- [phone]. cell [phone] you for the courtesy of this consultation.      Greater than 45 minutes spent in direct patient care reviewing  the notes, lab data/ imaging , discussion with multidisciplinary team.

## 2018-03-31 NOTE — CONSULT NOTE ADULT - ASSESSMENT
86 year old woman with atrial fibrillation (not chronically anticoagulated due to subdural hematoma), non-ischemic cardiomyopathy, moderate aortic insufficiency, CKD, a/w seizures, AMS and new CVA.    - She remains in AF with good rate control. Continue with digoxin and metoprolol at current doses.  - Digoxin level is wnl  - No sign of acute ischemia. EKG is unremarkable, except for AF.  - Last echo in 2018 with mild LV dysfunction (EF around 50%) with moderate MR and AI.  - can continue with lasix solution although no sign of significant volume overload  - Continue with Plavix given CVA history  - Despite her very high CVA risk in the setting of AF, she is a poor candidate for a/c given her subdural hematoma in the past.  - Neuro follow up  - Watch creatinine and electrolytes  - Will follow with you

## 2018-03-31 NOTE — PROGRESS NOTE ADULT - ASSESSMENT
86y F from [facility] with PMH Afib (digoxin, metoprolol, not AC candidate), ?CKD, dementia (nonverbal, PEG tube placed 2/2018), constipation, HTN, CHF (EF 50% 2/2018) subdural hematoma, HLD, presents to [place] ED with 2 seizure episodes this morning of unclear etiology, admitted for EEG, seizure evaluation/management.

## 2018-04-01 DIAGNOSIS — I63.9 CEREBRAL INFARCTION, UNSPECIFIED: ICD-10-CM

## 2018-04-01 LAB
ANION GAP SERPL CALC-SCNC: 9 MMOL/L — SIGNIFICANT CHANGE UP (ref 5–17)
BUN SERPL-MCNC: 27 MG/DL — HIGH (ref 7–23)
CALCIUM SERPL-MCNC: 7.7 MG/DL — LOW (ref 8.5–10.1)
CHLORIDE SERPL-SCNC: 103 MMOL/L — SIGNIFICANT CHANGE UP (ref 96–108)
CHOLEST SERPL-MCNC: 148 MG/DL — SIGNIFICANT CHANGE UP (ref 10–199)
CO2 SERPL-SCNC: 28 MMOL/L — SIGNIFICANT CHANGE UP (ref 22–31)
CREAT SERPL-MCNC: 0.9 MG/DL — SIGNIFICANT CHANGE UP (ref 0.5–1.3)
FERRITIN SERPL-MCNC: 129 NG/ML — SIGNIFICANT CHANGE UP (ref 15–150)
GLUCOSE SERPL-MCNC: 136 MG/DL — HIGH (ref 70–99)
HBA1C BLD-MCNC: 6 % — HIGH (ref 4–5.6)
HCT VFR BLD CALC: 30.5 % — LOW (ref 34.5–45)
HDLC SERPL-MCNC: 38 MG/DL — LOW (ref 40–125)
HGB BLD-MCNC: 9.7 G/DL — LOW (ref 11.5–15.5)
IRON SATN MFR SERPL: 12 % — LOW (ref 14–50)
IRON SATN MFR SERPL: 23 UG/DL — LOW (ref 30–160)
LIPID PNL WITH DIRECT LDL SERPL: 74 MG/DL — SIGNIFICANT CHANGE UP
MCHC RBC-ENTMCNC: 29.3 PG — SIGNIFICANT CHANGE UP (ref 27–34)
MCHC RBC-ENTMCNC: 31.7 GM/DL — LOW (ref 32–36)
MCV RBC AUTO: 92.3 FL — SIGNIFICANT CHANGE UP (ref 80–100)
PLATELET # BLD AUTO: 175 K/UL — SIGNIFICANT CHANGE UP (ref 150–400)
POTASSIUM SERPL-MCNC: 3.5 MMOL/L — SIGNIFICANT CHANGE UP (ref 3.5–5.3)
POTASSIUM SERPL-SCNC: 3.5 MMOL/L — SIGNIFICANT CHANGE UP (ref 3.5–5.3)
RBC # BLD: 3.31 M/UL — LOW (ref 3.8–5.2)
RBC # FLD: 14.4 % — SIGNIFICANT CHANGE UP (ref 10.3–14.5)
SODIUM SERPL-SCNC: 140 MMOL/L — SIGNIFICANT CHANGE UP (ref 135–145)
TIBC SERPL-MCNC: 189 UG/DL — LOW (ref 220–430)
TOTAL CHOLESTEROL/HDL RATIO MEASUREMENT: 3.9 RATIO — SIGNIFICANT CHANGE UP (ref 3.3–7.1)
TRIGL SERPL-MCNC: 179 MG/DL — HIGH (ref 10–149)
UIBC SERPL-MCNC: 166 UG/DL — SIGNIFICANT CHANGE UP (ref 110–370)
VALPROATE SERPL-MCNC: 100 UG/ML — SIGNIFICANT CHANGE UP (ref 50–100)
WBC # BLD: 5.9 K/UL — SIGNIFICANT CHANGE UP (ref 3.8–10.5)
WBC # FLD AUTO: 5.9 K/UL — SIGNIFICANT CHANGE UP (ref 3.8–10.5)

## 2018-04-01 PROCEDURE — 99232 SBSQ HOSP IP/OBS MODERATE 35: CPT

## 2018-04-01 PROCEDURE — 99233 SBSQ HOSP IP/OBS HIGH 50: CPT

## 2018-04-01 PROCEDURE — 93880 EXTRACRANIAL BILAT STUDY: CPT | Mod: 26

## 2018-04-01 RX ORDER — ATORVASTATIN CALCIUM 80 MG/1
40 TABLET, FILM COATED ORAL AT BEDTIME
Qty: 0 | Refills: 0 | Status: DISCONTINUED | OUTPATIENT
Start: 2018-04-01 | End: 2018-04-03

## 2018-04-01 RX ADMIN — PANTOPRAZOLE SODIUM 40 MILLIGRAM(S): 20 TABLET, DELAYED RELEASE ORAL at 17:52

## 2018-04-01 RX ADMIN — CLOPIDOGREL BISULFATE 75 MILLIGRAM(S): 75 TABLET, FILM COATED ORAL at 12:08

## 2018-04-01 RX ADMIN — Medication 0.12 MILLIGRAM(S): at 06:09

## 2018-04-01 RX ADMIN — Medication 25 MILLIGRAM(S): at 06:09

## 2018-04-01 RX ADMIN — LISINOPRIL 5 MILLIGRAM(S): 2.5 TABLET ORAL at 06:09

## 2018-04-01 RX ADMIN — RISPERIDONE 0.5 MILLIGRAM(S): 4 TABLET ORAL at 06:09

## 2018-04-01 RX ADMIN — Medication 500 MILLIGRAM(S): at 17:53

## 2018-04-01 RX ADMIN — Medication 25 MILLIGRAM(S): at 15:35

## 2018-04-01 RX ADMIN — Medication 1 TABLET(S): at 12:08

## 2018-04-01 RX ADMIN — RISPERIDONE 0.5 MILLIGRAM(S): 4 TABLET ORAL at 17:52

## 2018-04-01 RX ADMIN — ATORVASTATIN CALCIUM 40 MILLIGRAM(S): 80 TABLET, FILM COATED ORAL at 22:51

## 2018-04-01 RX ADMIN — SENNA PLUS 2 TABLET(S): 8.6 TABLET ORAL at 22:51

## 2018-04-01 RX ADMIN — PANTOPRAZOLE SODIUM 40 MILLIGRAM(S): 20 TABLET, DELAYED RELEASE ORAL at 06:10

## 2018-04-01 RX ADMIN — Medication 500 MILLIGRAM(S): at 06:10

## 2018-04-01 NOTE — PROGRESS NOTE ADULT - ASSESSMENT
86y F from [facility] with PMH Afib (digoxin, metoprolol, not AC candidate), ?CKD, dementia (nonverbal, PEG tube placed 2/2018), constipation, HTN, CHF (EF 50% 2/2018) subdural hematoma, HLD, presents to [place] ED with 2 seizure episodes this morning of unclear etiology, admitted for EEG, seizure evaluation/management. Found to have acute L parietal ischemic CVA

## 2018-04-01 NOTE — PROGRESS NOTE ADULT - SUBJECTIVE AND OBJECTIVE BOX
Follow up: AF    HPI:  86y F from [facility] with PMH Afib (digoxin, metoprolol, not on AC), ?CKD, dementia (nonverbal, PEG tube placed 2/2018), constipation, HTN, CHF (EF 50% 2/2018), subdural hematoma, HLD, presents to [place] ED with 2 seizure episodes this morning.  History is limited as patient is non-verbal and daughter was not present for seizures.  Daughter was told seizures lasted "a couple" of minutes, and that a third episode occurred during transport to hospital, lasting <2min and treated with Versed 5mg.  No further seizure activity has been witnessed and per daughter patient is currently at baseline.  No signs of pain.  No known trauma or recent illness.  Unable to obtain ROS 2/2 pt mental status.  Of note, patient was discharged from [place] 2/2018 after having a single seizure episode after which she was unable to speak/eat.  PEG tube placed at that time.  She was not placed on any epileptic medications at discharge.    No new events overnight      PAST MEDICAL & SURGICAL HISTORY:  RA (rheumatoid arthritis)  Constipation  Generalized anxiety disorder  Dry eye  Chronic pain  Chronic kidney disease: stage 3 moderate  Iron deficiency anemia  GERD (gastroesophageal reflux disease)  Dementia  HTN (hypertension)  Atrial fibrillation  Heart failure  Hyperlipidemia  Osteoarthritis  Subdural hematoma  Atrial fibrillation  Age related osteoporosis  HTN (hypertension)  S/P percutaneous endoscopic gastrostomy (PEG) tube placement      MEDICATIONS  (STANDING):  atorvastatin 40 milliGRAM(s) Oral at bedtime  calcium carbonate 1250 mG + Vitamin D (OsCal 500 + D) 1 Tablet(s) Oral daily  clopidogrel Tablet 75 milliGRAM(s) Oral daily  digoxin     Tablet 0.125 milliGRAM(s) Oral daily  furosemide Solution 20 milliGRAM(s) Oral <User Schedule>  lisinopril 5 milliGRAM(s) Oral daily  metoprolol tartrate 25 milliGRAM(s) Oral three times a day  pantoprazole  Injectable 40 milliGRAM(s) IV Push two times a day  risperiDONE   Tablet 0.5 milliGRAM(s) Oral two times a day  senna 2 Tablet(s) Oral at bedtime  valproic  acid Syrup 500 milliGRAM(s) Oral two times a day      Vital Signs Last 24 Hrs  T(C): 36.8 (01 Apr 2018 12:07), Max: 37 (01 Apr 2018 00:11)  T(F): 98.3 (01 Apr 2018 12:07), Max: 98.6 (01 Apr 2018 00:11)  HR: 75 (01 Apr 2018 12:07) (53 - 82)  BP: 117/76 (01 Apr 2018 12:07) (104/66 - 149/77)  BP(mean): --  RR: 18 (01 Apr 2018 12:07) (17 - 22)  SpO2: 100% (01 Apr 2018 12:07) (97% - 100%)    I&O's Summary    31 Mar 2018 07:01  -  01 Apr 2018 07:00  --------------------------------------------------------  IN: 660 mL / OUT: 0 mL / NET: 660 mL        PHYSICAL EXAM:    Constitutional: NAD,  Eyes:  Pupils round, no lesions  ENMT: no exudate or erythema  Pulmonary: Non-labored, breath sounds are clear bilaterally, No wheezing, rales or rhonchi  Cardiovascular: PMI not palpable irreg normal S1 and S2, no murmurs, rubs, gallops or clicks  Gastrointestinal: Bowel Sounds present, soft, nontender.   Lymph: No cervical lymphadenopathy.  Skin: No rashes.  No cyanosis.  Psych: cannot assess  Ext: No lower ext edema                                9.7    5.9   )-----------( 175      ( 01 Apr 2018 07:40 )             30.5     04-01    140  |  103  |  27<H>  ----------------------------<  136<H>  3.5   |  28  |  0.90    Ca    7.7<L>      01 Apr 2018 07:40    TPro  6.4  /  Alb  2.9<L>  /  TBili  0.5  /  DBili  x   /  AST  18  /  ALT  16  /  AlkPhos  75  03-31    < from: 12 Lead ECG (03.30.18 @ 08:20) >    Ventricular Rate 57 BPM    Atrial Rate 70 BPM    QRS Duration 128 ms    Q-T Interval 378 ms    QTC Calculation(Bezet) 367 ms    R Axis 12 degrees    T Axis -3 degrees    Diagnosis Line Atrial fibrillation with slow ventricular response  Left ventricular hypertrophy  Cannot rule out Septal infarct , age undetermined  Abnormal ECG    Confirmed by Fabian Guallpa ([id number]) on 3/31/2018 11:30:39 AM    < end of copied text >    < from: Xray Chest 1 View AP/PA (03.30.18 @ 08:56) >    EXAM:  XR CHEST AP OR PA 1V                            PROCEDURE DATE:  03/30/2018          INTERPRETATION:  Seizure.    AP chest. Prior dated 2/15/2018.    Heart is markedly enlarged even discounting magnification factor of AP   film. Intimal calcination aortic arch. No consolidation or effusion. Very   severe degenerative change bilateral shoulders including high riding   humeral heads suggesting chronic rotator cuff pathology.    Impression: Marked cardiomegaly. No active pleural-parenchymalprocess.   Stable exam.                ESTEBAN MAN M.D., ATTENDING RADIOLOGIST  This document has been electronically signed. Mar 30 2018  8:59AM                < end of copied text >  < from: US Transthoracic Echocardiogram w/Doppler Complete (02.06.18 @ 13:47) >    EXAM:  US TTE W DOPPLER COMPLETE                                  PROCEDURE DATE:  02/06/2018          INTERPRETATION:  Indication: To assess LV function and valvular function.    Measurements:  Aortic root 3.2 cm left atrium 4.4 cm right atrium 4.0 cm  Left ventricular diastolic diameter 4.5 systolic diameter 3.4  Interventricular septum 0.83, posterior 1.83  Aortic velocity 122, mitral velocity 105.  Ejection fraction 50%.    Mitral valve is thickened with annular calcification. Moderate mitral   regurgitation noted.  Aortic root is sclerotic with no aortic stenosis however moderate aortic   regurgitation is present.  Moderately severe tricuspid regurgitation noted with a PA pressure of 53.  Mild pulmonic regurgitation noted  Left atrium is enlarged  Right atrium is enlarged  Left ventricle internal dimensions appear to be normal with mild global   hypokinesis however ejection fraction is still about 50%.  Trace pericardial effusion noted.    Summary:  Mitral annular calcification with moderate mitral regurgitation  Moderate aortic regurgitation  Enlarged right and left atrium  Left ventricular ejection fraction 50%.                  JACQUI CONTI MD  This document has been electronically signed. Feb 7 2018 11:40AM                < end of copied text >

## 2018-04-01 NOTE — PROGRESS NOTE ADULT - SUBJECTIVE AND OBJECTIVE BOX
Patient is a 86y old  Female who presents with a chief complaint of seizure (30 Mar 2018 12:46)      HPI:  86y F from [facility] with PMH Afib (digoxin, metoprolol, not on AC), ?CKD, dementia (nonverbal, PEG tube placed 2/2018), constipation, HTN, CHF (EF 50% 2/2018), subdural hematoma, HLD, presents to [place] ED with 2 seizure episodes this morning.  History is limited as patient is non-verbal and daughter was not present for seizures.  Daughter was told seizures lasted "a couple" of minutes, and that a third episode occurred during transport to hospital, lasting <2min and treated with Versed 5mg.  No further seizure activity has been witnessed and per daughter patient is currently at baseline.  No signs of pain.  No known trauma or recent illness.  Unable to obtain ROS 2/2 pt mental status.  Of note, patient was discharged from [place] 2/2018 after having a single seizure episode after which she was unable to speak/eat.  PEG tube placed at that time.  She was not placed on any epileptic medications at discharge.    ED Course:   Vitals on presentation: T97.4F (skin), HR 73, /67, RR 18, SpO2 100% on RA.  Labs: WBC 4.2, Hg 9.7, Na 146, Cl 111, Total protein 5.8, albumin 2.7.  Tani (-) x1.  UA (-).  Dig level WNL. EKG: Afib @ 57bpm.  CT head: no acute intracranial pathology.  Chronic infarcts in right occipital lobe, left posterior parietal lobe similar to prior.  Multifocal chronic lacunar infarcts in b/l basal ganglia and thalamus similar to prior.  No acute infarct/hemorrhage. B/l subdural hygromas/chronic subdural hematomas over the convexities similar to prior.  CXR: marked cardiomegaly.  No active pleural-parenchymal process.  Pt was given 1l NS bolus x1, valproate 1g IV x1.  Blood/urine cultures collected.  Seen by Dr. Sommer in ED. (30 Mar 2018 12:46)      INTERVAL HPI/OVERNIGHT EVENTS: Pt seen and evaluated at the bedside. RRT was called yesterday late afternoon for altered mental status but this was not an acute change. Pt remained hemodynamically stable and this is likely due to new L sided parietal CVA. Pt is nonverbal this morning, minimally responsive to verbal or physical stimuli.     TELEMETRY: afib @ 79bpm and has been rate controlled, no events    MR Brain (3/31/18): small LEFT parietal cortical acute infarction with restricted diffusion. No evidence of associated hemorrhage.      T(C): 36.5 (04-01-18 @ 07:55), Max: 37.1 (03-31-18 @ 12:43)  HR: 74 (04-01-18 @ 07:55) (53 - 82)  BP: 133/77 (04-01-18 @ 07:55) (104/66 - 149/77)  RR: 19 (04-01-18 @ 07:55) (16 - 22)  SpO2: 100% (04-01-18 @ 07:55) (97% - 100%)  Wt(kg): --  I&O's Summary    31 Mar 2018 07:01  -  01 Apr 2018 07:00  --------------------------------------------------------  IN: 660 mL / OUT: 0 mL / NET: 660 mL    REVIEW OF SYSTEMS:  unable to obtain ROS due to nonverbal status    PHYSICAL EXAM:  GENERAL: patient appears comfortable, no acute distress  EYES: sclera clear, no exudates, EOMI, PERRL  ENMT: oropharynx clear without erythema, no exudates, dry mucous membranes  NECK: supple, soft, no thyromegaly noted  LUNGS: good air entry bilaterally, clear to auscultation, symmetric breath sounds, no wheezing or rhonchi appreciated  HEART: soft S1/S2, irregular rhythm, bradycardic, no murmurs noted, no lower extremity edema  GASTROINTESTINAL: abdomen is soft, nontender, nondistended, normoactive bowel sounds, no palpable masses  INTEGUMENT: good skin turgor, no lesions noted  MUSCULOSKELETAL: no clubbing or cyanosis, no obvious deformity  NEUROLOGIC: appears sedated, unable to assess motor strength as she is not following commands however she appears to be moving both legs and LUE. RUE appears weak but unable to properly assess muscle strenght, responsive to noxious stimuli  PSYCHIATRIC: unable to assess  HEME/LYMPH: no palpable supraclavicular nodules, no obvious ecchymosis or petechiae       MEDICATIONS  (STANDING):  calcium carbonate 1250 mG + Vitamin D (OsCal 500 + D) 1 Tablet(s) Oral daily  clopidogrel Tablet 75 milliGRAM(s) Oral daily  digoxin     Tablet 0.125 milliGRAM(s) Oral daily  furosemide Solution 20 milliGRAM(s) Oral <User Schedule>  lisinopril 5 milliGRAM(s) Oral daily  metoprolol tartrate 25 milliGRAM(s) Oral three times a day  pantoprazole  Injectable 40 milliGRAM(s) IV Push two times a day  risperiDONE   Tablet 0.5 milliGRAM(s) Oral two times a day  senna 2 Tablet(s) Oral at bedtime  valproic  acid Syrup 500 milliGRAM(s) Oral two times a day    MEDICATIONS  (PRN):  acetaminophen   Tablet. 650 milliGRAM(s) Oral every 6 hours PRN Moderate Pain (4 - 6)      LABS:                        9.7    5.9   )-----------( 175      ( 01 Apr 2018 07:40 )             30.5     04-01    140  |  103  |  27<H>  ----------------------------<  136<H>  3.5   |  28  |  0.90    Ca    7.7<L>      01 Apr 2018 07:40    TPro  6.4  /  Alb  2.9<L>  /  TBili  0.5  /  DBili  x   /  AST  18  /  ALT  16  /  AlkPhos  75  03-31        CAPILLARY BLOOD GLUCOSE      POCT Blood Glucose.: 99 mg/dL (31 Mar 2018 13:45)      03-30 @ 11:30   No growth to date.  --  --  03-30 @ 11:17   No growth  --  --

## 2018-04-01 NOTE — PROGRESS NOTE ADULT - SUBJECTIVE AND OBJECTIVE BOX
Neurology follow up note    MARIA DEL ROSARIO [id number]      Interval History:    Patient seen with daughter resting in bed     MEDICATIONS    acetaminophen   Tablet. 650 milliGRAM(s) Oral every 6 hours PRN  atorvastatin 40 milliGRAM(s) Oral at bedtime  calcium carbonate 1250 mG + Vitamin D (OsCal 500 + D) 1 Tablet(s) Oral daily  clopidogrel Tablet 75 milliGRAM(s) Oral daily  digoxin     Tablet 0.125 milliGRAM(s) Oral daily  furosemide Solution 20 milliGRAM(s) Oral <User Schedule>  lisinopril 5 milliGRAM(s) Oral daily  metoprolol tartrate 25 milliGRAM(s) Oral three times a day  pantoprazole  Injectable 40 milliGRAM(s) IV Push two times a day  risperiDONE   Tablet 0.5 milliGRAM(s) Oral two times a day  senna 2 Tablet(s) Oral at bedtime  valproic  acid Syrup 500 milliGRAM(s) Oral two times a day      Allergies    No Known Allergies    Intolerances            Vital Signs Last 24 Hrs  T(C): 36.8 (01 Apr 2018 12:07), Max: 37.1 (31 Mar 2018 12:43)  T(F): 98.3 (01 Apr 2018 12:07), Max: 98.7 (31 Mar 2018 12:43)  HR: 75 (01 Apr 2018 12:07) (53 - 82)  BP: 117/76 (01 Apr 2018 12:07) (104/66 - 149/77)  BP(mean): --  RR: 18 (01 Apr 2018 12:07) (16 - 22)  SpO2: 100% (01 Apr 2018 12:07) (97% - 100%)    REVIEW OF SYSTEMS: Non Verbal  Limited or unable to obtain secondary to patient's poor mental status.      On Neurological Examination:     Pupils bilaterally were 2 mm, slightly reactive.  The patient appears to have signs of cataract in her eyes.      Speech non verbral     The patient does not follow any commands.     Motor:  I attempted to elevate bilateral upper extremities, the patient would actively resist, pull both arms down, would say overall was 3/5 left . but right arm did appear weaker 2/5 flexation at elbows only   Bilateral lower extremities with plantar stimulation, positive flexation and withdrawal to hip and knee left 3/5 right 1/5.      GENERAL Exam: Nontoxic , No Acute Distress   	  HEENT:  normocephalic, atraumatic  		  LUNGS:   Decreased bilaterally  	  HEART: Normal S1S2   No murmur RRR        	  GI/ ABDOMEN:  Soft  Non tender    EXTREMITIES:   No Edema  No Clubbing  No Cyanosis No Edema    MUSCULOSKELETAL: decreased Range of Motion all 4 extremities   	   SKIN: Normal  No Ecchymosis               LABS:  CBC Full  -  ( 01 Apr 2018 07:40 )  WBC Count : 5.9 K/uL  Hemoglobin : 9.7 g/dL  Hematocrit : 30.5 %  Platelet Count - Automated : 175 K/uL  Mean Cell Volume : 92.3 fl  Mean Cell Hemoglobin : 29.3 pg  Mean Cell Hemoglobin Concentration : 31.7 gm/dL  Auto Neutrophil # : x  Auto Lymphocyte # : x  Auto Monocyte # : x  Auto Eosinophil # : x  Auto Basophil # : x  Auto Neutrophil % : x  Auto Lymphocyte % : x  Auto Monocyte % : x  Auto Eosinophil % : x  Auto Basophil % : x      04-01    140  |  103  |  27<H>  ----------------------------<  136<H>  3.5   |  28  |  0.90    Ca    7.7<L>      01 Apr 2018 07:40    TPro  6.4  /  Alb  2.9<L>  /  TBili  0.5  /  DBili  x   /  AST  18  /  ALT  16  /  AlkPhos  75  03-31    Hemoglobin A1C: Hemoglobin A1C, Whole Blood: 6.0 % (04-01 @ 09:55)    Lipid Panel 04-01 @ 09:55  Total Cholesterol, Serum 148  LDL 74  Triglycerides 179    LIVER FUNCTIONS - ( 31 Mar 2018 05:11 )  Alb: 2.9 g/dL / Pro: 6.4 g/dL / ALK PHOS: 75 U/L / ALT: 16 U/L / AST: 18 U/L / GGT: x           Vitamin B12         RADIOLOGY    ANALYSIS AND PLAN:  An 86-year-old with a history of cerebrovascular accident, dementia, and seizure.  1.	For status epilepticus, the patient's risk factors for seizures could be a secondary to stroke and also dementia.  2.	We will give the patient Depakene started on 500 twice a day will repeat levels may need to adjust dose  EEG pending   3.	I would recommend Ativan 1 mg IV push q.6h. p.r.n. for any type of breakthrough seizure.  4.	Seizure precautions.  5.	CD noted   6.	For history of dementia secondary to the patient's age and appeared to be advanced, supportive therapy.  7.	For cerebrovascular accident, it appeared that the patient may have a history of AFib, but also has a history of subdural hematoma.  The patient is currently on plavix/ statin ..  8.	 spoke to daughter in detail, she does understand high risk of repeated CVA. She understands risk vs benefits.    9.	Spoke with daughter at bedside 4/1/18- [phone]. cell     Thank you for the courtesy of this consultation.      Greater than 45 minutes spent in direct patient care reviewing  the notes, lab data/ imaging , discussion with multidisciplinary team.

## 2018-04-02 LAB
ANION GAP SERPL CALC-SCNC: 9 MMOL/L — SIGNIFICANT CHANGE UP (ref 5–17)
BUN SERPL-MCNC: 36 MG/DL — HIGH (ref 7–23)
CALCIUM SERPL-MCNC: 8 MG/DL — LOW (ref 8.5–10.1)
CHLORIDE SERPL-SCNC: 103 MMOL/L — SIGNIFICANT CHANGE UP (ref 96–108)
CO2 SERPL-SCNC: 27 MMOL/L — SIGNIFICANT CHANGE UP (ref 22–31)
CREAT SERPL-MCNC: 1 MG/DL — SIGNIFICANT CHANGE UP (ref 0.5–1.3)
GLUCOSE SERPL-MCNC: 117 MG/DL — HIGH (ref 70–99)
HCT VFR BLD CALC: 31.1 % — LOW (ref 34.5–45)
HGB BLD-MCNC: 10.1 G/DL — LOW (ref 11.5–15.5)
MCHC RBC-ENTMCNC: 29.7 PG — SIGNIFICANT CHANGE UP (ref 27–34)
MCHC RBC-ENTMCNC: 32.5 GM/DL — SIGNIFICANT CHANGE UP (ref 32–36)
MCV RBC AUTO: 91.5 FL — SIGNIFICANT CHANGE UP (ref 80–100)
PLATELET # BLD AUTO: 175 K/UL — SIGNIFICANT CHANGE UP (ref 150–400)
POTASSIUM SERPL-MCNC: 3.7 MMOL/L — SIGNIFICANT CHANGE UP (ref 3.5–5.3)
POTASSIUM SERPL-SCNC: 3.7 MMOL/L — SIGNIFICANT CHANGE UP (ref 3.5–5.3)
RBC # BLD: 3.4 M/UL — LOW (ref 3.8–5.2)
RBC # FLD: 14 % — SIGNIFICANT CHANGE UP (ref 10.3–14.5)
SODIUM SERPL-SCNC: 139 MMOL/L — SIGNIFICANT CHANGE UP (ref 135–145)
VALPROATE SERPL-MCNC: 64 UG/ML — SIGNIFICANT CHANGE UP (ref 50–100)
WBC # BLD: 7.6 K/UL — SIGNIFICANT CHANGE UP (ref 3.8–10.5)
WBC # FLD AUTO: 7.6 K/UL — SIGNIFICANT CHANGE UP (ref 3.8–10.5)

## 2018-04-02 PROCEDURE — 99232 SBSQ HOSP IP/OBS MODERATE 35: CPT

## 2018-04-02 PROCEDURE — 99233 SBSQ HOSP IP/OBS HIGH 50: CPT

## 2018-04-02 RX ORDER — LACOSAMIDE 50 MG/1
50 TABLET ORAL
Qty: 0 | Refills: 0 | Status: DISCONTINUED | OUTPATIENT
Start: 2018-04-02 | End: 2018-04-03

## 2018-04-02 RX ORDER — LACOSAMIDE 50 MG/1
200 TABLET ORAL ONCE
Qty: 0 | Refills: 0 | Status: DISCONTINUED | OUTPATIENT
Start: 2018-04-02 | End: 2018-04-02

## 2018-04-02 RX ADMIN — PANTOPRAZOLE SODIUM 40 MILLIGRAM(S): 20 TABLET, DELAYED RELEASE ORAL at 05:48

## 2018-04-02 RX ADMIN — LACOSAMIDE 200 MILLIGRAM(S): 50 TABLET ORAL at 14:37

## 2018-04-02 RX ADMIN — Medication 0.12 MILLIGRAM(S): at 05:49

## 2018-04-02 RX ADMIN — Medication 20 MILLIGRAM(S): at 18:34

## 2018-04-02 RX ADMIN — Medication 25 MILLIGRAM(S): at 05:49

## 2018-04-02 RX ADMIN — Medication 25 MILLIGRAM(S): at 22:37

## 2018-04-02 RX ADMIN — Medication 25 MILLIGRAM(S): at 14:21

## 2018-04-02 RX ADMIN — LACOSAMIDE 50 MILLIGRAM(S): 50 TABLET ORAL at 22:46

## 2018-04-02 RX ADMIN — ATORVASTATIN CALCIUM 40 MILLIGRAM(S): 80 TABLET, FILM COATED ORAL at 22:37

## 2018-04-02 RX ADMIN — SENNA PLUS 2 TABLET(S): 8.6 TABLET ORAL at 22:37

## 2018-04-02 RX ADMIN — Medication 1 TABLET(S): at 12:35

## 2018-04-02 RX ADMIN — LISINOPRIL 5 MILLIGRAM(S): 2.5 TABLET ORAL at 05:49

## 2018-04-02 RX ADMIN — Medication 500 MILLIGRAM(S): at 05:48

## 2018-04-02 RX ADMIN — Medication 500 MILLIGRAM(S): at 17:42

## 2018-04-02 RX ADMIN — RISPERIDONE 0.5 MILLIGRAM(S): 4 TABLET ORAL at 05:49

## 2018-04-02 RX ADMIN — CLOPIDOGREL BISULFATE 75 MILLIGRAM(S): 75 TABLET, FILM COATED ORAL at 12:35

## 2018-04-02 RX ADMIN — PANTOPRAZOLE SODIUM 40 MILLIGRAM(S): 20 TABLET, DELAYED RELEASE ORAL at 17:42

## 2018-04-02 RX ADMIN — RISPERIDONE 0.5 MILLIGRAM(S): 4 TABLET ORAL at 17:41

## 2018-04-02 NOTE — PROGRESS NOTE ADULT - PROBLEM SELECTOR PLAN 2
-chronic, rate controlled  -patient is not on anticoagulation at SNF; discussion with SNF staff indicates that pt is prone to climbing out of bed but is unable to walk. This is likely the reason for lack of AC at SNF but may be discussed further at time of discharge.  -monitor on tele  -continue home metoprolol, digoxin  -continue to hold therapeutic AC, poor candidate (fall risk, nonverbal, bedbound)
-unclear etiology; pt has hx of solitary seizure ~6wks ago not treated with long-term seizure meds.  CT at admission suggests multiple chronic cerebral infarcts, which may be the etiology of the current episode - now w/ acute CVA  -continue valproic acid per neuro (Dr. Sommer) recs  -EEG ordered (for Monday)  -seizure, fall, aspiration precautions  -f/u cultures (no growth to date, await finals)  -pt is DNR/DNI; MOLST is in chart
-unclear etiology; pt has hx of solitary seizure ~6wks ago not treated with long-term seizure meds.  CT at admission suggests multiple chronic cerebral infarcts, which may be the etiology of the current episode - now w/ acute CVA  -continue valproic acid per neuro (Dr. Sommer) recs  -EEG ordered (for Monday)  -seizure, fall, aspiration precautions  -f/u cultures (no growth to date, await finals)  -pt is DNR/DNI; MOLST is in chart  -spoke w/ neurology, will add vimpat w/ abnormal EEG today and repeat EEG tomorrow

## 2018-04-02 NOTE — PROGRESS NOTE ADULT - PROBLEM SELECTOR PLAN 1
small LEFT parietal cortical acute infarction with restricted diffusion. No evidence of associated hemorrhage  cholesterol profile reviewed. add lipitor 40  d/c'd [id number] and added plavix 75 q daily  PT consult pending  SW consult - will need rehab as she has had a dramatic change from her baseline functional status, likely dispo to [place] (she has been a resident there for 2 yrs) if services available  swallow eval unnecessary as she has peg and is tolerating feeds, aspiration precautions  NIHSS  neuro checks  hgba1c is still pending  carotid u/s negative for significant stenosis
small LEFT parietal cortical acute infarction with restricted diffusion. No evidence of associated hemorrhage  cholesterol profile reviewed. on lipitor 40  plavix 75 q daily  PT (unable to participate thus far due to lethargy)  swallow eval unnecessary as she has peg and is tolerating feeds, aspiration precautions  NIHSS  neuro checks  hgba1c is still pending  carotid u/s negative for significant stenosis  SW consult, discussed w/ team
-admit to telemetry  -unclear etiology; pt has hx of solitary seizure ~6wks ago not treated with long-term seizure meds.  CT at admission suggests multiple chronic cerebral infarcts, which may be the etiology of the current episode.   -CBC/BMP grossly nl at admission, dig level wnl  -s/p valproic acid syrup in ED  -continue valproic acid per neuro (Dr. Sommer) recs  -EEG ordered (for Monday), MRI ordered (for today)  -seizure, fall, aspiration precautions  -f/u cultures  -pt is DNR/DNI; MOLST is in chart

## 2018-04-02 NOTE — PROGRESS NOTE ADULT - ASSESSMENT
86y F from [facility] with PMH Afib (digoxin, metoprolol, not AC candidate), ?CKD, dementia (nonverbal, PEG tube placed 2/2018), constipation, HTN, CHF (EF 50% 2/2018) subdural hematoma, HLD, presents to [place] ED with 2 seizure episodes this morning of unclear etiology, admitted for EEG, seizure evaluation/management. Found to have acute L parietal ischemic CVA

## 2018-04-02 NOTE — PROGRESS NOTE ADULT - SUBJECTIVE AND OBJECTIVE BOX
[facility] - Radha Gallagher, Gil, Paulina, Christine, Lynda Trevino  Office Number:  [phone]    Patient resting comfortably in bed in NAD.  Not responsive this morning. Unable to obtain history    ROS: negative unless otherwise mentioned.    Telemetry:  AF    MEDICATIONS  (STANDING):  atorvastatin 40 milliGRAM(s) Oral at bedtime  calcium carbonate 1250 mG + Vitamin D (OsCal 500 + D) 1 Tablet(s) Oral daily  clopidogrel Tablet 75 milliGRAM(s) Oral daily  digoxin     Tablet 0.125 milliGRAM(s) Oral daily  furosemide Solution 20 milliGRAM(s) Oral <User Schedule>  lacosamide 200 milliGRAM(s) Oral once  lacosamide 50 milliGRAM(s) Oral two times a day  lisinopril 5 milliGRAM(s) Oral daily  metoprolol tartrate 25 milliGRAM(s) Oral three times a day  pantoprazole  Injectable 40 milliGRAM(s) IV Push two times a day  risperiDONE   Tablet 0.5 milliGRAM(s) Oral two times a day  senna 2 Tablet(s) Oral at bedtime  valproic  acid Syrup 500 milliGRAM(s) Oral two times a day    MEDICATIONS  (PRN):  acetaminophen   Tablet. 650 milliGRAM(s) Oral every 6 hours PRN Moderate Pain (4 - 6)      Allergies    No Known Allergies    Intolerances        Vital Signs Last 24 Hrs  T(C): 37.1 (02 Apr 2018 11:44), Max: 37.9 (02 Apr 2018 05:14)  T(F): 98.7 (02 Apr 2018 11:44), Max: 100.2 (02 Apr 2018 05:14)  HR: 100 (02 Apr 2018 11:44) (83 - 106)  BP: 140/68 (02 Apr 2018 11:44) (101/65 - 140/68)  BP(mean): --  RR: 20 (02 Apr 2018 11:44) (18 - 20)  SpO2: 99% (02 Apr 2018 11:44) (98% - 100%)    I&O's Summary    01 Apr 2018 07:01  -  02 Apr 2018 07:00  --------------------------------------------------------  IN: 1350 mL / OUT: 0 mL / NET: 1350 mL        ON EXAM:    Constitutional: NAD,  Eyes:  Pupils round, no lesions  ENMT: no exudate or erythema  Pulmonary: Non-labored, breath sounds are clear bilaterally, No wheezing, rales or rhonchi  Cardiovascular: PMI not palpable irreg normal S1 and S2, no murmurs, rubs, gallops or clicks  Gastrointestinal: Bowel Sounds present, soft, nontender.   Lymph: No cervical lymphadenopathy.  Skin: No rashes.  No cyanosis.  Psych: cannot assess  Ext: No lower ext edema    LABS: All Labs Reviewed:                        10.1   7.6   )-----------( 175      ( 02 Apr 2018 06:32 )             31.1                         9.7    5.9   )-----------( 175      ( 01 Apr 2018 07:40 )             30.5                         9.8    6.6   )-----------( 201      ( 31 Mar 2018 05:11 )             29.6     02 Apr 2018 06:32    139    |  103    |  36     ----------------------------<  117    3.7     |  27     |  1.00   01 Apr 2018 07:40    140    |  103    |  27     ----------------------------<  136    3.5     |  28     |  0.90   31 Mar 2018 05:11    141    |  104    |  19     ----------------------------<  108    3.5     |  30     |  0.83     Ca    8.0        02 Apr 2018 06:32  Ca    7.7        01 Apr 2018 07:40  Ca    8.3        31 Mar 2018 05:11    TPro  6.4    /  Alb  2.9    /  TBili  0.5    /  DBili  x      /  AST  18     /  ALT  16     /  AlkPhos  75     31 Mar 2018 05:11          Blood Culture: Organism --  Gram Stain Blood -- Gram Stain --  Specimen Source .Blood Blood-Peripheral  Culture-Blood --    Organism --  Gram Stain Blood -- Gram Stain --  Specimen Source .Urine Catheterized  Culture-Blood --

## 2018-04-02 NOTE — PROGRESS NOTE ADULT - PROBLEM SELECTOR PLAN 4
appears close to baseline, historically between 10 and 11, will trend. f/u occult blood. f/u iron studies
-chronic  -LVEF 50% 2/2018 with valve abnormalities and wall hypokinesis  -continue home metoprolol, dig, lisinopril, lasix  -no signs/symptoms of volume overload at this time  -cardiology following
-chronic  -LVEF 50% 2/2018 with valve abnormalities and wall hypokinesis  -continue home metoprolol, dig, lisinopril, lasix  -no signs/symptoms of volume overload at this time  -cardiology following

## 2018-04-02 NOTE — PROGRESS NOTE ADULT - PROBLEM SELECTOR PLAN 6
-chronic  -continue home senna
-chronic, controlled  -continue home metoprolol, lisinopril, lasix w/ holding parameters
-chronic, controlled  -continue home metoprolol, lisinopril, lasix w/ holding parameters

## 2018-04-02 NOTE — PROGRESS NOTE ADULT - PROBLEM SELECTOR PLAN 9
DVT PPx: lovenox sq daily (renal dosing)  IMPROVE VTE Individual Risk Assessment          RISK       Points    [  ] Previous VTE 3  [  ] Thrombophilia  2  [  ] Lower limb paralysis  2        (unable to hold up >15 seconds)    [  ] Current Cancer    2         (within 6 months)  [  x] Immobilization > 24 hrs 1  [  ] ICU/CCU stay > 24 hours   1  [  x] Age > 60  1  IMPROVE VTE Score: 2
-pt is currently at baseline  -continue home risperidone   -per nursing home, pt has hx of agitation and trying to climb out of bed.  will need additional supervisions if these behaviors recur  -q2h repositioning
-pt is currently at baseline  -continue home risperidone   -per nursing home, pt has hx of agitation and trying to climb out of bed.  will need additional supervisions if these behaviors recur  -q2h repositioning

## 2018-04-02 NOTE — PROGRESS NOTE ADULT - SUBJECTIVE AND OBJECTIVE BOX
Neurology follow up note    MARIA DEL ROSARIO [id number]      Interval History: Patient seen with daughter resting in bed         MEDICATIONS    acetaminophen   Tablet. 650 milliGRAM(s) Oral every 6 hours PRN  atorvastatin 40 milliGRAM(s) Oral at bedtime  calcium carbonate 1250 mG + Vitamin D (OsCal 500 + D) 1 Tablet(s) Oral daily  clopidogrel Tablet 75 milliGRAM(s) Oral daily  digoxin     Tablet 0.125 milliGRAM(s) Oral daily  furosemide Solution 20 milliGRAM(s) Oral <User Schedule>  lisinopril 5 milliGRAM(s) Oral daily  metoprolol tartrate 25 milliGRAM(s) Oral three times a day  pantoprazole  Injectable 40 milliGRAM(s) IV Push two times a day  risperiDONE   Tablet 0.5 milliGRAM(s) Oral two times a day  senna 2 Tablet(s) Oral at bedtime  valproic  acid Syrup 500 milliGRAM(s) Oral two times a day      Allergies    No Known Allergies    Intolerances            Vital Signs Last 24 Hrs  T(C): 37.1 (02 Apr 2018 11:44), Max: 37.9 (02 Apr 2018 05:14)  T(F): 98.7 (02 Apr 2018 11:44), Max: 100.2 (02 Apr 2018 05:14)  HR: 100 (02 Apr 2018 11:44) (83 - 106)  BP: 140/68 (02 Apr 2018 11:44) (101/65 - 140/68)  BP(mean): --  RR: 20 (02 Apr 2018 11:44) (18 - 20)  SpO2: 99% (02 Apr 2018 11:44) (98% - 100%)        REVIEW OF SYSTEMS: Non Verbal  Limited or unable to obtain secondary to patient's poor mental status.      On Neurological Examination:     Pupils bilaterally were 2 mm, slightly reactive.  The patient appears to have signs of cataract in her eyes.      Speech non verbal     The patient does not follow any commands.     Motor:  I attempted to elevate bilateral upper extremities, the patient would actively resist, pull both arms down, would say overall was 3/5 left . but right arm did appear weaker 2/5 flexation at elbows only   Bilateral lower extremities with plantar stimulation, positive flexation and withdrawal to hip and knee left 3/5 right 1/5.      GENERAL Exam: Nontoxic , No Acute Distress   	  HEENT:  normocephalic, atraumatic  		  LUNGS:   Decreased bilaterally  	  HEART: Normal S1S2   No murmur RRR        	  GI/ ABDOMEN:  Soft  Non tender    EXTREMITIES:   No Edema  No Clubbing  No Cyanosis No Edema    MUSCULOSKELETAL: decreased Range of Motion all 4 extremities   	   SKIN: Normal  No Ecchymosis             LABS:  CBC Full  -  ( 02 Apr 2018 06:32 )  WBC Count : 7.6 K/uL  Hemoglobin : 10.1 g/dL  Hematocrit : 31.1 %  Platelet Count - Automated : 175 K/uL  Mean Cell Volume : 91.5 fl  Mean Cell Hemoglobin : 29.7 pg  Mean Cell Hemoglobin Concentration : 32.5 gm/dL  Auto Neutrophil # : x  Auto Lymphocyte # : x  Auto Monocyte # : x  Auto Eosinophil # : x  Auto Basophil # : x  Auto Neutrophil % : x  Auto Lymphocyte % : x  Auto Monocyte % : x  Auto Eosinophil % : x  Auto Basophil % : x      04-02    139  |  103  |  36<H>  ----------------------------<  117<H>  3.7   |  27  |  1.00    Ca    8.0<L>      02 Apr 2018 06:32      Hemoglobin A1C:       Vitamin B12         RADIOLOGY        ANALYSIS AND PLAN:  An 86-year-old with a history of cerebrovascular accident, dementia, and seizure.  1.	For status epilepticus, the patient's risk factors for seizures could be a secondary to stroke and also dementia.  2.	We will give the patient Depakene started on 500 twice a day will repeat levels may need to adjust dose  EEG noted will add Vimpat repeat EEG   3.	I would recommend Ativan 1 mg IV push q.6h. p.r.n. for any type of breakthrough seizure.  4.	Seizure precautions.  5.	CD noted   6.	For history of dementia secondary to the patient's age and appeared to be advanced, supportive therapy.  7.	For cerebrovascular accident, it appeared that the patient may have a history of AFib, but also has a history of subdural hematoma.  The patient is currently on plavix/ statin ..  8.	 spoke to daughter in detail, she does understand high risk of repeated CVA. She understands risk vs benefits.    9.	Spoke with daughter at bedside 4/2/18- [phone]. cell     Thank you for the courtesy of this consultation.      Greater than 40 minutes spent in direct patient care reviewing  the notes, lab data/ imaging , discussion with multidisciplinary team.

## 2018-04-02 NOTE — PROGRESS NOTE ADULT - PROBLEM SELECTOR PLAN 5
-chronic, controlled  -continue home metoprolol, lisinopril, lasix
appears close to baseline, historically between 10 and 11, will trend  hgb 10.1 today  iron studies suggestive of anemia of chronic disease
appears close to baseline, historically between 10 and 11, will trend  hgb 9.7 today  iron studies suggestive of anemia of chronic disease

## 2018-04-02 NOTE — PROGRESS NOTE ADULT - PROBLEM SELECTOR PLAN 7
-continue PEG feeds  -nutrition consult placed; appreciate recs
-chronic  -continue home senna
-chronic  -continue home senna

## 2018-04-02 NOTE — PROGRESS NOTE ADULT - PROBLEM SELECTOR PLAN 3
-chronic  -LVEF 50% 2/2018 with valve abnormalities and wall hypokinesis  -continue home metoprolol, dig, lisinopril, lasix  -no signs/symptoms of volume overload at this time
-chronic, rate controlled  -patient is not on anticoagulation at SNF; discussion with SNF staff indicates that pt is prone to climbing out of bed but is unable to walk. This is likely the reason for lack of AC at SNF but may be discussed further at time of discharge.  -monitor on tele  -continue home metoprolol, digoxin  -continue to hold therapeutic AC, poor candidate (fall risk, nonverbal, bedbound)
-chronic, rate controlled  -patient is not on anticoagulation at SNF; discussion with SNF staff indicates that pt is prone to climbing out of bed but is unable to walk. This is likely the reason for lack of AC at SNF. also w/ h/o subdural bleed  -monitor on tele  -continue home metoprolol, digoxin  -continue to hold therapeutic AC, poor candidate (fall risk, nonverbal, bedbound, h/o subdural)

## 2018-04-02 NOTE — PROGRESS NOTE ADULT - ASSESSMENT
86 year old woman with atrial fibrillation (not chronically anticoagulated due to subdural hematoma), non-ischemic cardiomyopathy, moderate aortic insufficiency, CKD, a/w seizures, AMS and new CVA.    - She remains in AF with good rate control. Continue with digoxin and metoprolol at current doses.  - Digoxin level is wnl  - No sign of acute ischemia. EKG is unremarkable, except for AF.  - Last echo in 2018 with mild LV dysfunction (EF around 50%) with moderate MR and AI.  - can continue with lasix solution although no sign of significant volume overload  - Continue with Plavix given CVA history  - Despite her very high CVA risk in the setting of AF, she is a poor candidate for a/c given her subdural hematoma in the past.  - Neuro follow up  - Watch creatinine and electrolytes  - Poor overall prognosis.  - Will follow with you

## 2018-04-02 NOTE — PROGRESS NOTE ADULT - SUBJECTIVE AND OBJECTIVE BOX
Patient is a 86y old  Female who presents with a chief complaint of seizure (30 Mar 2018 12:46)      HPI:  86y F from [facility] with PMH Afib (digoxin, metoprolol, not on AC), ?CKD, dementia (nonverbal, PEG tube placed 2/2018), constipation, HTN, CHF (EF 50% 2/2018), subdural hematoma, HLD, presents to [place] ED with 2 seizure episodes this morning.  History is limited as patient is non-verbal and daughter was not present for seizures.  Daughter was told seizures lasted "a couple" of minutes, and that a third episode occurred during transport to hospital, lasting <2min and treated with Versed 5mg.  No further seizure activity has been witnessed and per daughter patient is currently at baseline.  No signs of pain.  No known trauma or recent illness.  Unable to obtain ROS 2/2 pt mental status.  Of note, patient was discharged from [place] 2/2018 after having a single seizure episode after which she was unable to speak/eat.  PEG tube placed at that time.  She was not placed on any epileptic medications at discharge.    ED Course:   Vitals on presentation: T97.4F (skin), HR 73, /67, RR 18, SpO2 100% on RA.  Labs: WBC 4.2, Hg 9.7, Na 146, Cl 111, Total protein 5.8, albumin 2.7.  Tani (-) x1.  UA (-).  Dig level WNL. EKG: Afib @ 57bpm.  CT head: no acute intracranial pathology.  Chronic infarcts in right occipital lobe, left posterior parietal lobe similar to prior.  Multifocal chronic lacunar infarcts in b/l basal ganglia and thalamus similar to prior.  No acute infarct/hemorrhage. B/l subdural hygromas/chronic subdural hematomas over the convexities similar to prior.  CXR: marked cardiomegaly.  No active pleural-parenchymal process.  Pt was given 1l NS bolus x1, valproate 1g IV x1.  Blood/urine cultures collected.  Seen by Dr. Sommer in ED. (30 Mar 2018 12:46)      INTERVAL HPI/OVERNIGHT EVENTS: Pt seen and evaluated at the bedside. No acute overnight events occurred. Pt is nonverbal. Minimally responsive which is unchanged from previous few days.    TELEMETRY: rate controlled afib, no events      T(C): 37.1 (04-02-18 @ 11:44), Max: 37.9 (04-02-18 @ 05:14)  HR: 100 (04-02-18 @ 11:44) (83 - 106)  BP: 140/68 (04-02-18 @ 11:44) (101/65 - 140/68)  RR: 20 (04-02-18 @ 11:44) (18 - 20)  SpO2: 99% (04-02-18 @ 11:44) (98% - 100%)  Wt(kg): --  I&O's Summary    01 Apr 2018 07:01  -  02 Apr 2018 07:00  --------------------------------------------------------  IN: 1350 mL / OUT: 0 mL / NET: 1350 mL      REVIEW OF SYSTEMS:  unable to obtain ROS due to nonverbal status    PHYSICAL EXAM:  GENERAL: patient appears comfortable, no acute distress  EYES: sclera clear, no exudates, EOMI, PERRL  ENMT: oropharynx clear without erythema, no exudates, dry mucous membranes  NECK: supple, soft, no thyromegaly noted  LUNGS: good air entry bilaterally, clear to auscultation, symmetric breath sounds, no wheezing or rhonchi appreciated  HEART: soft S1/S2, irregular rhythm, bradycardic, no murmurs noted, no lower extremity edema  GASTROINTESTINAL: abdomen is soft, nontender, nondistended, normoactive bowel sounds, no palpable masses  INTEGUMENT: good skin turgor, no lesions noted  MUSCULOSKELETAL: no clubbing or cyanosis, no obvious deformity  NEUROLOGIC: appears sedated, unable to assess motor strength as she is not following commands however she appears to be moving both legs and LUE. RUE appears weak but unable to properly assess muscle strength, responsive to noxious stimuli  PSYCHIATRIC: unable to assess  HEME/LYMPH: no palpable supraclavicular nodules, no obvious ecchymosis or petechiae      MEDICATIONS  (STANDING):  atorvastatin 40 milliGRAM(s) Oral at bedtime  calcium carbonate 1250 mG + Vitamin D (OsCal 500 + D) 1 Tablet(s) Oral daily  clopidogrel Tablet 75 milliGRAM(s) Oral daily  digoxin     Tablet 0.125 milliGRAM(s) Oral daily  furosemide Solution 20 milliGRAM(s) Oral <User Schedule>  lacosamide 50 milliGRAM(s) Oral two times a day  lisinopril 5 milliGRAM(s) Oral daily  metoprolol tartrate 25 milliGRAM(s) Oral three times a day  pantoprazole  Injectable 40 milliGRAM(s) IV Push two times a day  risperiDONE   Tablet 0.5 milliGRAM(s) Oral two times a day  senna 2 Tablet(s) Oral at bedtime  valproic  acid Syrup 500 milliGRAM(s) Oral two times a day    MEDICATIONS  (PRN):  acetaminophen   Tablet. 650 milliGRAM(s) Oral every 6 hours PRN Moderate Pain (4 - 6)      LABS:                        10.1   7.6   )-----------( 175      ( 02 Apr 2018 06:32 )             31.1     04-02    139  |  103  |  36<H>  ----------------------------<  117<H>  3.7   |  27  |  1.00    Ca    8.0<L>      02 Apr 2018 06:32          CAPILLARY BLOOD GLUCOSE          03-30 @ 11:30   No growth to date.  --  --  03-30 @ 11:17   No growth  --  --

## 2018-04-03 ENCOUNTER — TRANSCRIPTION ENCOUNTER (OUTPATIENT)
Age: 83
End: 2018-04-03

## 2018-04-03 VITALS
HEART RATE: 83 BPM | RESPIRATION RATE: 20 BRPM | SYSTOLIC BLOOD PRESSURE: 138 MMHG | OXYGEN SATURATION: 99 % | TEMPERATURE: 98 F | DIASTOLIC BLOOD PRESSURE: 72 MMHG

## 2018-04-03 LAB
ANION GAP SERPL CALC-SCNC: 7 MMOL/L — SIGNIFICANT CHANGE UP (ref 5–17)
BUN SERPL-MCNC: 35 MG/DL — HIGH (ref 7–23)
CALCIUM SERPL-MCNC: 7.8 MG/DL — LOW (ref 8.5–10.1)
CHLORIDE SERPL-SCNC: 99 MMOL/L — SIGNIFICANT CHANGE UP (ref 96–108)
CO2 SERPL-SCNC: 32 MMOL/L — HIGH (ref 22–31)
CREAT SERPL-MCNC: 1 MG/DL — SIGNIFICANT CHANGE UP (ref 0.5–1.3)
GLUCOSE SERPL-MCNC: 109 MG/DL — HIGH (ref 70–99)
HCT VFR BLD CALC: 28.3 % — LOW (ref 34.5–45)
HGB BLD-MCNC: 9.5 G/DL — LOW (ref 11.5–15.5)
MCHC RBC-ENTMCNC: 30.5 PG — SIGNIFICANT CHANGE UP (ref 27–34)
MCHC RBC-ENTMCNC: 33.7 GM/DL — SIGNIFICANT CHANGE UP (ref 32–36)
MCV RBC AUTO: 90.5 FL — SIGNIFICANT CHANGE UP (ref 80–100)
PLATELET # BLD AUTO: 155 K/UL — SIGNIFICANT CHANGE UP (ref 150–400)
POTASSIUM SERPL-MCNC: 3.3 MMOL/L — LOW (ref 3.5–5.3)
POTASSIUM SERPL-SCNC: 3.3 MMOL/L — LOW (ref 3.5–5.3)
RBC # BLD: 3.13 M/UL — LOW (ref 3.8–5.2)
RBC # FLD: 14.1 % — SIGNIFICANT CHANGE UP (ref 10.3–14.5)
SODIUM SERPL-SCNC: 138 MMOL/L — SIGNIFICANT CHANGE UP (ref 135–145)
WBC # BLD: 8 K/UL — SIGNIFICANT CHANGE UP (ref 3.8–10.5)
WBC # FLD AUTO: 8 K/UL — SIGNIFICANT CHANGE UP (ref 3.8–10.5)

## 2018-04-03 PROCEDURE — 82728 ASSAY OF FERRITIN: CPT

## 2018-04-03 PROCEDURE — 83036 HEMOGLOBIN GLYCOSYLATED A1C: CPT

## 2018-04-03 PROCEDURE — 80048 BASIC METABOLIC PNL TOTAL CA: CPT

## 2018-04-03 PROCEDURE — 80061 LIPID PANEL: CPT

## 2018-04-03 PROCEDURE — 96366 THER/PROPH/DIAG IV INF ADDON: CPT

## 2018-04-03 PROCEDURE — 70551 MRI BRAIN STEM W/O DYE: CPT

## 2018-04-03 PROCEDURE — 85027 COMPLETE CBC AUTOMATED: CPT

## 2018-04-03 PROCEDURE — 96375 TX/PRO/DX INJ NEW DRUG ADDON: CPT

## 2018-04-03 PROCEDURE — 80162 ASSAY OF DIGOXIN TOTAL: CPT

## 2018-04-03 PROCEDURE — 80053 COMPREHEN METABOLIC PANEL: CPT

## 2018-04-03 PROCEDURE — 85610 PROTHROMBIN TIME: CPT

## 2018-04-03 PROCEDURE — 96376 TX/PRO/DX INJ SAME DRUG ADON: CPT

## 2018-04-03 PROCEDURE — 84484 ASSAY OF TROPONIN QUANT: CPT

## 2018-04-03 PROCEDURE — 93005 ELECTROCARDIOGRAM TRACING: CPT

## 2018-04-03 PROCEDURE — 82553 CREATINE MB FRACTION: CPT

## 2018-04-03 PROCEDURE — 70450 CT HEAD/BRAIN W/O DYE: CPT

## 2018-04-03 PROCEDURE — 87040 BLOOD CULTURE FOR BACTERIA: CPT

## 2018-04-03 PROCEDURE — 99285 EMERGENCY DEPT VISIT HI MDM: CPT | Mod: 25

## 2018-04-03 PROCEDURE — 99239 HOSP IP/OBS DSCHRG MGMT >30: CPT

## 2018-04-03 PROCEDURE — 97162 PT EVAL MOD COMPLEX 30 MIN: CPT

## 2018-04-03 PROCEDURE — 81001 URINALYSIS AUTO W/SCOPE: CPT

## 2018-04-03 PROCEDURE — 71045 X-RAY EXAM CHEST 1 VIEW: CPT

## 2018-04-03 PROCEDURE — 83605 ASSAY OF LACTIC ACID: CPT

## 2018-04-03 PROCEDURE — 80164 ASSAY DIPROPYLACETIC ACD TOT: CPT

## 2018-04-03 PROCEDURE — 96365 THER/PROPH/DIAG IV INF INIT: CPT

## 2018-04-03 PROCEDURE — 95816 EEG AWAKE AND DROWSY: CPT

## 2018-04-03 PROCEDURE — 82550 ASSAY OF CK (CPK): CPT

## 2018-04-03 PROCEDURE — 87086 URINE CULTURE/COLONY COUNT: CPT

## 2018-04-03 PROCEDURE — 83550 IRON BINDING TEST: CPT

## 2018-04-03 PROCEDURE — 99232 SBSQ HOSP IP/OBS MODERATE 35: CPT

## 2018-04-03 PROCEDURE — 82962 GLUCOSE BLOOD TEST: CPT

## 2018-04-03 PROCEDURE — 99497 ADVNCD CARE PLAN 30 MIN: CPT | Mod: 25

## 2018-04-03 PROCEDURE — 85730 THROMBOPLASTIN TIME PARTIAL: CPT

## 2018-04-03 PROCEDURE — 93880 EXTRACRANIAL BILAT STUDY: CPT

## 2018-04-03 RX ORDER — POTASSIUM CHLORIDE 20 MEQ
40 PACKET (EA) ORAL EVERY 4 HOURS
Qty: 0 | Refills: 0 | Status: COMPLETED | OUTPATIENT
Start: 2018-04-03 | End: 2018-04-03

## 2018-04-03 RX ORDER — ATORVASTATIN CALCIUM 80 MG/1
1 TABLET, FILM COATED ORAL
Qty: 0 | Refills: 0 | COMMUNITY
Start: 2018-04-03

## 2018-04-03 RX ORDER — LACOSAMIDE 50 MG/1
1 TABLET ORAL
Qty: 0 | Refills: 0 | COMMUNITY
Start: 2018-04-03

## 2018-04-03 RX ORDER — VALPROIC ACID (AS SODIUM SALT) 250 MG/5ML
10 SOLUTION, ORAL ORAL
Qty: 0 | Refills: 0 | COMMUNITY
Start: 2018-04-03

## 2018-04-03 RX ORDER — POTASSIUM CHLORIDE 20 MEQ
40 PACKET (EA) ORAL EVERY 4 HOURS
Qty: 0 | Refills: 0 | Status: DISCONTINUED | OUTPATIENT
Start: 2018-04-03 | End: 2018-04-03

## 2018-04-03 RX ORDER — ASPIRIN/CALCIUM CARB/MAGNESIUM 324 MG
1 TABLET ORAL
Qty: 0 | Refills: 0 | COMMUNITY

## 2018-04-03 RX ORDER — PANTOPRAZOLE SODIUM 20 MG/1
40 TABLET, DELAYED RELEASE ORAL
Qty: 0 | Refills: 0 | COMMUNITY
Start: 2018-04-03

## 2018-04-03 RX ORDER — MORPHINE SULFATE 50 MG/1
0.12 CAPSULE, EXTENDED RELEASE ORAL
Qty: 100 | Refills: 0 | OUTPATIENT
Start: 2018-04-03 | End: 2018-05-02

## 2018-04-03 RX ORDER — CLOPIDOGREL BISULFATE 75 MG/1
1 TABLET, FILM COATED ORAL
Qty: 0 | Refills: 0 | COMMUNITY
Start: 2018-04-03

## 2018-04-03 RX ADMIN — Medication 40 MILLIEQUIVALENT(S): at 11:27

## 2018-04-03 RX ADMIN — Medication 0.12 MILLIGRAM(S): at 05:45

## 2018-04-03 RX ADMIN — LACOSAMIDE 50 MILLIGRAM(S): 50 TABLET ORAL at 17:40

## 2018-04-03 RX ADMIN — Medication 500 MILLIGRAM(S): at 17:40

## 2018-04-03 RX ADMIN — Medication 1 TABLET(S): at 11:27

## 2018-04-03 RX ADMIN — PANTOPRAZOLE SODIUM 40 MILLIGRAM(S): 20 TABLET, DELAYED RELEASE ORAL at 05:45

## 2018-04-03 RX ADMIN — Medication 40 MILLIEQUIVALENT(S): at 17:40

## 2018-04-03 RX ADMIN — CLOPIDOGREL BISULFATE 75 MILLIGRAM(S): 75 TABLET, FILM COATED ORAL at 11:27

## 2018-04-03 RX ADMIN — Medication 25 MILLIGRAM(S): at 14:59

## 2018-04-03 RX ADMIN — LACOSAMIDE 50 MILLIGRAM(S): 50 TABLET ORAL at 05:46

## 2018-04-03 RX ADMIN — LISINOPRIL 5 MILLIGRAM(S): 2.5 TABLET ORAL at 05:45

## 2018-04-03 RX ADMIN — Medication 500 MILLIGRAM(S): at 05:44

## 2018-04-03 RX ADMIN — Medication 40 MILLIEQUIVALENT(S): at 15:00

## 2018-04-03 RX ADMIN — Medication 25 MILLIGRAM(S): at 05:45

## 2018-04-03 RX ADMIN — RISPERIDONE 0.5 MILLIGRAM(S): 4 TABLET ORAL at 05:44

## 2018-04-03 RX ADMIN — PANTOPRAZOLE SODIUM 40 MILLIGRAM(S): 20 TABLET, DELAYED RELEASE ORAL at 17:40

## 2018-04-03 RX ADMIN — RISPERIDONE 0.5 MILLIGRAM(S): 4 TABLET ORAL at 17:40

## 2018-04-03 NOTE — GOALS OF CARE CONVERSATION - PERSONAL ADVANCE DIRECTIVE - CONVERSATION DETAILS
met pt daughter, discussed pt medical conditions, plan of care, molst reviewed: daughter agrees to dnr dni comfort care, continue peg feeds at this time, aware as pt closer to organs failing, may be time to withhold feeds. Debby dye work present, discussed pt returning to [place] on comfort care. Molst completed by Dr Beyer. support to daughter for decisions.

## 2018-04-03 NOTE — DISCHARGE NOTE ADULT - PROVIDER TOKENS
FREE:[LAST:[Jesus],FIRST:[],PHONE:[(   )    -],FAX:[(   )    -],ADDRESS:[[facility]]]

## 2018-04-03 NOTE — PROGRESS NOTE ADULT - PROVIDER SPECIALTY LIST ADULT
Cardiology
Cardiology
Hospitalist
Hospitalist
Neurology
Cardiology
Hospitalist

## 2018-04-03 NOTE — PROGRESS NOTE ADULT - SUBJECTIVE AND OBJECTIVE BOX
SUBJECTIVE:  Patient is a 86y old  Female who presents with a chief complaint of seizure (2018 12:45)    Not in distress.  Non-verbal.  Remains in Afib with rate reasonably controlled.    OBJECTIVE:  Review Of Systems:  Constitutional: [ ] Fever [ ] Chills [ ] Fatigue [ ] Weight change   HEENT: [ ] Blurred vision [ ] Eye Pain [ ] Headache [ ] Runny nose [ ] Sore Throat   Respiratory: [ ] Cough [ ] Wheezing [ ] Shortness of breath  Cardiovascular: [ ] Chest Pain [ ] Palpitations [ ] COLBY [ ] PND [ ] Orthopnea  Gastrointestinal: [ ] Abdominal Pain [ ] Diarrhea [ ] Constipation [ ] Hemorrhoids [ ] Nausea [ ] Vomiting  Genitourinary: [ ] Nocturia [ ] Dysuria [ ] Incontinence  Extremities: [ ] Swelling [ ] Joint Pain  Neurologic: [ ] Focal deficit [ ] Paresthesias [ ] Syncope  Lymphatic: [ ] Swelling [ ] Lymphadenopathy   Skin: [ ] Rash [ ] Ecchymoses [ ] Wounds [ ] Lesions  Psychiatry: [ ] Depression [ ] Suicidal/Homicidal Ideation [ ] Anxiety [ ] Sleep Disturbances  [ ] 10 point review of systems is otherwise negative except as mentioned above            [ x]Unable to obtain    Allergy:  Allergies    No Known Allergies    Intolerances    Medications:  MEDICATIONS  (STANDING):  atorvastatin 40 milliGRAM(s) Oral at bedtime  calcium carbonate 1250 mG + Vitamin D (OsCal 500 + D) 1 Tablet(s) Oral daily  clopidogrel Tablet 75 milliGRAM(s) Oral daily  digoxin     Tablet 0.125 milliGRAM(s) Oral daily  furosemide Solution 20 milliGRAM(s) Oral <User Schedule>  lacosamide 50 milliGRAM(s) Oral two times a day  lisinopril 5 milliGRAM(s) Oral daily  metoprolol tartrate 25 milliGRAM(s) Oral three times a day  pantoprazole  Injectable 40 milliGRAM(s) IV Push two times a day  potassium chloride   Powder 40 milliEquivalent(s) Oral every 4 hours  risperiDONE   Tablet 0.5 milliGRAM(s) Oral two times a day  senna 2 Tablet(s) Oral at bedtime  valproic  acid Syrup 500 milliGRAM(s) Oral two times a day    MEDICATIONS  (PRN):  acetaminophen   Tablet. 650 milliGRAM(s) Oral every 6 hours PRN Moderate Pain (4 - 6)    PMH/PSH/FH/SH: [ ] Unchanged    Vitals:  T(C): 36.4 (18 @ 13:01), Max: 37.4 (18 @ 23:49)  HR: 70 (18 @ 13:01) (70 - 92)  BP: 130/76 (18 @ 13:01) (117/51 - 145/92)  BP(mean): --  RR: 20 (18 @ 13:01) (20 - 20)  SpO2: 100% (18 @ 13:01) (97% - 100%)  Wt(kg): --  Daily     Daily Weight in k.2 (2018 12:45)  I&O's Summary    2018 07:01  -  2018 07:00  --------------------------------------------------------  IN: 1350 mL / OUT: 0 mL / NET: 1350 mL    Labs:                        9.5    8.0   )-----------( 155      ( 2018 06:07 )             28.3     04-03    138  |  99  |  35<H>  ----------------------------<  109<H>  3.3<L>   |  32<H>  |  1.00    Ca    7.8<L>      2018 06:07    ECG:    Echo:  < from: US Transthoracic Echocardiogram w/Doppler Complete (18 @ 13:47) >    EXAM:  US TTE W DOPPLER COMPLETE                          PROCEDURE DATE:  2018      INTERPRETATION:  Indication: To assess LV function and valvular function.    Measurements:  Aortic root 3.2 cm left atrium 4.4 cm right atrium 4.0 cm  Left ventricular diastolic diameter 4.5 systolic diameter 3.4  Interventricular septum 0.83, posterior 1.83  Aortic velocity 122, mitral velocity 105.  Ejection fraction 50%.    Mitral valve is thickened with annular calcification. Moderate mitral   regurgitation noted.  Aortic root is sclerotic with no aortic stenosis however moderate aortic   regurgitation is present.  Moderately severe tricuspid regurgitation noted with a PA pressure of 53.  Mild pulmonic regurgitation noted  Left atrium is enlarged  Right atrium is enlarged  Left ventricle internal dimensions appear to be normal with mild global   hypokinesis however ejection fraction is still about 50%.  Trace pericardial effusion noted.    Summary:  Mitral annular calcification with moderate mitral regurgitation  Moderate aortic regurgitation  Enlarged right and left atrium  Left ventricular ejection fraction 50%.    JACQUI OCNTI MD  This document has been electronically signed. 2018 11:40AM      < end of copied text >    Stress Testing:     Cath:    Imaging:    Interpretation of Telemetry:  Afib      Physical Exam:  Appearance: [ ] Normal  [ ] abnormal [x ] NAD   Eyes: [ ] PERRL [ ] EOMI  HENT: [ ] Normal [ ] Abnormal oral muscosa [ ]NC/AT  Cardiovascular: [x ] S1 [x ] S2 [ ] RRR  [x] Irregularly irregular  [ ] m/r/g [ ]edema [ ] JVP  Procedural Access Site: [ ]  hematoma [ ] tender to palpation [ ] 2+ pulse [ ] bruit [ ] Ecchymosis  Respiratory: [ x] Clear to auscultation bilaterally  Gastrointestinal: [x ] Soft [ ] tenderness[ ] distension [ x] BS  Musculoskeletal: [ ] clubbing [ ] joint deformity   Neurologic: [ ] Non-focal  Lymphatic: [ ] lymphadenopathy  Psychiatry: [ ] AAOx3  [ ] confused [ ] disoriented [ ] Mood & affect appropriate  [x] Non-verbal  Skin: [ ]  rashes [ ] ecchymoses [ ] cyanosis

## 2018-04-03 NOTE — PROGRESS NOTE ADULT - ATTENDING COMMENTS
Chart reviewed    Patient seen and examined    Agree with plan as outlined above
The tx plan was discussed in detail with the patient and family members at the bedside. Their questions and concerns were addressed to the best of my ability. They are in agreement with the plan as detailed above. They demonstrated adequate understanding of the counseling which I have provided.      I spoke w/ Maida Perea (pt's daughter) @ bedside and she is agreeable w/ tx plan.
The tx plan was discussed in detail with the patient and family members at the bedside. Their questions and concerns were addressed to the best of my ability. They are in agreement with the plan as detailed above. They demonstrated adequate understanding of the counseling which I have provided.
The tx plan was discussed in detail with the patient and family members at the bedside. Their questions and concerns were addressed to the best of my ability. They are in agreement with the plan as detailed above. They demonstrated adequate understanding of the counseling which I have provided.      I spoke w/ Madia Perea (pt's daughter) @ bedside and she is agreeable w/ tx plan.

## 2018-04-03 NOTE — DISCHARGE NOTE ADULT - MEDICATION SUMMARY - MEDICATIONS TO TAKE
I will START or STAY ON the medications listed below when I get home from the hospital:    acetaminophen 160 mg/5 mL oral liquid  -- 20 milliliter(s) by gastrostomy tube every 6 hours, As Needed pain    meds verified from [facility]  by [facility]  -- Indication: For As needed for pain    lisinopril 5 mg oral tablet  -- 1 tab(s) by mouth once a day  -- Indication: For HTN (hypertension)    digoxin 125 mcg (0.125 mg) oral tablet  -- 1 tab(s) by mouth once a day  -- Indication: For Atrial fibrillation    lacosamide 50 mg oral tablet  -- 1 tab(s) by mouth 2 times a day  -- Indication: For Seizures    valproic acid 250 mg/5 mL oral syrup  -- 10 milliliter(s) by mouth 2 times a day  -- Indication: For Seizures    atorvastatin 40 mg oral tablet  -- 1 tab(s) by mouth once a day (at bedtime)  -- Indication: For Dyslipidemia    clopidogrel 75 mg oral tablet  -- 1 tab(s) by mouth once a day  -- Indication: For CVA    risperiDONE 0.5 mg oral tablet  -- 0.5 milligram(s) by gastrostomy tube 2 times a day  -- Indication: For Agitation    metoprolol tartrate 25 mg oral tablet  -- 1 tab(s) by mouth 3 times a day  -- Indication: For HTN (hypertension)    furosemide 10 mg/mL oral liquid  -- 20 milligram(s) by mouth 2 times a week  monday and friday  -- Indication: For HTN (hypertension)    famotidine 20 mg oral tablet  -- 1 tab(s) by mouth once a day  -- Indication: For GERD/reflux    senna oral tablet  -- 2 tab(s) by mouth once a day (at bedtime)  -- Indication: For Constipation    latanoprost 0.005% ophthalmic solution  -- 1 drop(s) to each affected eye once a day (in the evening) into both eyes  -- Indication: For Glaucoma    pantoprazole 40 mg intravenous injection  -- 40 milligram(s) intravenous 2 times a day  -- Indication: For GERD    Oyst-Joe-D 500 mg-200 intl units oral tablet  -- 1 tab(s) by mouth once a day  -- Indication: For Supplement I will START or STAY ON the medications listed below when I get home from the hospital:    acetaminophen 160 mg/5 mL oral liquid  -- 20 milliliter(s) by gastrostomy tube every 6 hours, As Needed pain    meds verified from [facility]  by [facility]  -- Indication: For As needed for pain    morphine 20 mg/mL oral concentrate  -- 0.125 milliliter(s) by mouth every 4 hours MDD:1 ; please administer as needed for respiratory distress, agitation, discomfort  -- Caution federal law prohibits the transfer of this drug to any person other  than the person for whom it was prescribed.  Dilute this medication with liquid before administration.  May cause drowsiness.  Alcohol may intensify this effect.  Use care when operating dangerous machinery.  This prescription cannot be refilled.  Using more of this medication than prescribed may cause serious breathing problems.    -- Indication: For respiratory distress, agitation, discomfort, pain    lisinopril 5 mg oral tablet  -- 1 tab(s) by mouth once a day  -- Indication: For HTN (hypertension)    digoxin 125 mcg (0.125 mg) oral tablet  -- 1 tab(s) by mouth once a day  -- Indication: For Atrial fibrillation    LORazepam 2 mg/mL oral concentrate  -- 0.25 milliliter(s) by mouth every 8 hours MDD:1 as needed for agitation  -- Caution federal law prohibits the transfer of this drug to any person other  than the person for whom it was prescribed.  Dilute this medication with liquid before administration.  Do not take this drug if you are pregnant.  Keep in refrigerator.  Do not freeze.  May cause drowsiness.  Alcohol may intensify this effect.  Use care when operating dangerous machinery.    -- Indication: For Agitation    lacosamide 50 mg oral tablet  -- 1 tab(s) by mouth 2 times a day  -- Indication: For Seizures    valproic acid 250 mg/5 mL oral syrup  -- 10 milliliter(s) by mouth 2 times a day  -- Indication: For Seizures    atorvastatin 40 mg oral tablet  -- 1 tab(s) by mouth once a day (at bedtime)  -- Indication: For Dyslipidemia    clopidogrel 75 mg oral tablet  -- 1 tab(s) by mouth once a day  -- Indication: For CVA    risperiDONE 0.5 mg oral tablet  -- 0.5 milligram(s) by gastrostomy tube 2 times a day  -- Indication: For Agitation    metoprolol tartrate 25 mg oral tablet  -- 1 tab(s) by mouth 3 times a day  -- Indication: For HTN (hypertension)    furosemide 10 mg/mL oral liquid  -- 20 milligram(s) by mouth 2 times a week  monday and friday  -- Indication: For HTN (hypertension)    famotidine 20 mg oral tablet  -- 1 tab(s) by mouth once a day  -- Indication: For GERD/reflux    senna oral tablet  -- 2 tab(s) by mouth once a day (at bedtime)  -- Indication: For Constipation    latanoprost 0.005% ophthalmic solution  -- 1 drop(s) to each affected eye once a day (in the evening) into both eyes  -- Indication: For Glaucoma    pantoprazole 40 mg intravenous injection  -- 40 milligram(s) intravenous 2 times a day  -- Indication: For GERD    Oyst-Joe-D 500 mg-200 intl units oral tablet  -- 1 tab(s) by mouth once a day  -- Indication: For Supplement

## 2018-04-03 NOTE — PROGRESS NOTE ADULT - ASSESSMENT
86 year old woman with atrial fibrillation (not chronically anticoagulated due to subdural hematoma), non-ischemic cardiomyopathy, moderate aortic insufficiency, CKD, a/w seizures, AMS and new CVA.    - Patient is a DNR/DNI  - She remains in AF with good rate control. Continue with digoxin and metoprolol at current doses.  - Digoxin level is wnl  - No sign of acute ischemia. EKG is unremarkable, except for AF.  - Last echo in 2018 with mild LV dysfunction (EF around 50%) with moderate MR and AI.  - No indication for diuresis at this time.  However, may diurese prn  - Continue with Plavix and statin given CVA history  - Despite her very high CVA risk in the setting of AF, she is a poor candidate for a/c given her subdural hematoma in the past.  - Neuro follow up  - Watch creatinine and electrolytes.  Hypokalemic today, K = 3.3. replete as needed to keep >4  - Poor overall prognosis.  - Will follow with you    Elizabeth Rodriguez NP  Cardiology 86 year old woman with atrial fibrillation (not chronically anticoagulated due to subdural hematoma), non-ischemic cardiomyopathy, moderate aortic insufficiency, CKD, a/w seizures, AMS and new CVA.    - Patient is a DNR/DNI  - She remains in AF with good rate control. Continue with digoxin and metoprolol at current doses.  - No sign of acute ischemia. EKG is unremarkable, except for AF.  - Last echo in 2018 with mild LV dysfunction (EF around 50%) with moderate MR and AI.  - No indication for diuresis at this time.  However, may diurese prn  - Continue with Plavix and statin given CVA history  - Continue ACEI for BP control  - Despite her very high CVA risk in the setting of AF, she is a poor candidate for a/c given her subdural hematoma in the past.  - Neuro follow up  - Watch creatinine and electrolytes.  Hypokalemic today, K = 3.3. replete as needed to keep >4  - Poor overall prognosis.  - Will follow with you    Elizabeth Rodriguez NP  Cardiology

## 2018-04-03 NOTE — DISCHARGE NOTE ADULT - NS AS DC STROKE ED MATERIALS
Prescribed Medications/Stroke Education Booklet/Need for Followup After Discharge/Stroke Warning Signs and Symptoms/Risk Factors for Stroke/Call 911 for Stroke

## 2018-04-03 NOTE — DISCHARGE NOTE ADULT - PATIENT PORTAL LINK FT
You can access the [facility] Patient Portal, offered by [facility], by registering with the following website: [email or web address]

## 2018-04-03 NOTE — PROGRESS NOTE ADULT - SUBJECTIVE AND OBJECTIVE BOX
Neurology follow up note    MARIA DEL ROSARIO [id number]      Interval History: Patient resting in bed         MEDICATIONS    acetaminophen   Tablet. 650 milliGRAM(s) Oral every 6 hours PRN  atorvastatin 40 milliGRAM(s) Oral at bedtime  calcium carbonate 1250 mG + Vitamin D (OsCal 500 + D) 1 Tablet(s) Oral daily  clopidogrel Tablet 75 milliGRAM(s) Oral daily  digoxin     Tablet 0.125 milliGRAM(s) Oral daily  furosemide Solution 20 milliGRAM(s) Oral <User Schedule>  lacosamide 50 milliGRAM(s) Oral two times a day  lisinopril 5 milliGRAM(s) Oral daily  metoprolol tartrate 25 milliGRAM(s) Oral three times a day  pantoprazole  Injectable 40 milliGRAM(s) IV Push two times a day  potassium chloride   Powder 40 milliEquivalent(s) Oral every 4 hours  risperiDONE   Tablet 0.5 milliGRAM(s) Oral two times a day  senna 2 Tablet(s) Oral at bedtime  valproic  acid Syrup 500 milliGRAM(s) Oral two times a day      Allergies    No Known Allergies    Intolerances            Vital Signs Last 24 Hrs  T(C): 36.8 (03 Apr 2018 07:38), Max: 37.4 (02 Apr 2018 23:49)  T(F): 98.3 (03 Apr 2018 07:38), Max: 99.4 (02 Apr 2018 23:49)  HR: 85 (03 Apr 2018 07:38) (71 - 100)  BP: 135/82 (03 Apr 2018 07:38) (117/51 - 145/92)  BP(mean): --  RR: 20 (03 Apr 2018 07:38) (20 - 20)  SpO2: 97% (03 Apr 2018 07:38) (97% - 100%)        REVIEW OF SYSTEMS: Non Verbal  Limited or unable to obtain secondary to patient's poor mental status.      On Neurological Examination:     Pupils bilaterally were 2 mm, slightly reactive.  The patient appears to have signs of cataract in her eyes.      Speech non verbal     The patient does not follow any commands.     Motor:  I attempted to elevate bilateral upper extremities, the patient would actively resist, pull both arms down, would say overall was 3/5 left . but right arm did appear weaker 2/5 flexation at elbows only   Bilateral lower extremities with plantar stimulation, positive flexation and withdrawal to hip and knee left 3/5 right 1/5.      GENERAL Exam: Nontoxic , No Acute Distress   	  HEENT:  normocephalic, atraumatic  		  LUNGS:   Decreased bilaterally  	  HEART: Normal S1S2   No murmur RRR        	  GI/ ABDOMEN:  Soft  Non tender    EXTREMITIES:   No Edema  No Clubbing  No Cyanosis No Edema    MUSCULOSKELETAL: decreased Range of Motion all 4 extremities   	   SKIN: Normal  No Ecchymosis                  LABS:  CBC Full  -  ( 03 Apr 2018 06:07 )  WBC Count : 8.0 K/uL  Hemoglobin : 9.5 g/dL  Hematocrit : 28.3 %  Platelet Count - Automated : 155 K/uL  Mean Cell Volume : 90.5 fl  Mean Cell Hemoglobin : 30.5 pg  Mean Cell Hemoglobin Concentration : 33.7 gm/dL  Auto Neutrophil # : x  Auto Lymphocyte # : x  Auto Monocyte # : x  Auto Eosinophil # : x  Auto Basophil # : x  Auto Neutrophil % : x  Auto Lymphocyte % : x  Auto Monocyte % : x  Auto Eosinophil % : x  Auto Basophil % : x      04-03    138  |  99  |  35<H>  ----------------------------<  109<H>  3.3<L>   |  32<H>  |  1.00    Ca    7.8<L>      03 Apr 2018 06:07      Hemoglobin A1C:       Vitamin B12         RADIOLOGY    ANALYSIS AND PLAN:  An 86-year-old with a history of cerebrovascular accident, dementia, and seizure.  1.	For status epilepticus, the patient's risk factors for seizures could be a secondary to stroke and also dementia.  2.	We will give the patient Depakene started on 500 twice a day will repeat levels may need to adjust dose  EEG noted will add Vimpat repeat EEG   3.	I would recommend Ativan 1 mg IV push q.6h. p.r.n. for any type of breakthrough seizure.  4.	Seizure precautions.  5.	CD noted   6.	For history of dementia secondary to the patient's age and appeared to be advanced, supportive therapy.  7.	For cerebrovascular accident, it appeared that the patient may have a history of AFib, but also has a history of subdural hematoma.  The patient is currently on plavix/ statin ..  8.	 spoke to daughter in detail, she does understand high risk of repeated CVA. She understands risk vs benefits.    9.	Spoke with daughter at bedside 4/2/18- [phone]. cell   10.	overall no change in exam     Thank you for the courtesy of this consultation.      Greater than 40 minutes spent in direct patient care reviewing  the notes, lab data/ imaging , discussion with multidisciplinary team. Neurology follow up note    MARIA DEL ROSARIO [id number]      Interval History: Patient resting in bed         MEDICATIONS    acetaminophen   Tablet. 650 milliGRAM(s) Oral every 6 hours PRN  atorvastatin 40 milliGRAM(s) Oral at bedtime  calcium carbonate 1250 mG + Vitamin D (OsCal 500 + D) 1 Tablet(s) Oral daily  clopidogrel Tablet 75 milliGRAM(s) Oral daily  digoxin     Tablet 0.125 milliGRAM(s) Oral daily  furosemide Solution 20 milliGRAM(s) Oral <User Schedule>  lacosamide 50 milliGRAM(s) Oral two times a day  lisinopril 5 milliGRAM(s) Oral daily  metoprolol tartrate 25 milliGRAM(s) Oral three times a day  pantoprazole  Injectable 40 milliGRAM(s) IV Push two times a day  potassium chloride   Powder 40 milliEquivalent(s) Oral every 4 hours  risperiDONE   Tablet 0.5 milliGRAM(s) Oral two times a day  senna 2 Tablet(s) Oral at bedtime  valproic  acid Syrup 500 milliGRAM(s) Oral two times a day      Allergies    No Known Allergies    Intolerances            Vital Signs Last 24 Hrs  T(C): 36.8 (03 Apr 2018 07:38), Max: 37.4 (02 Apr 2018 23:49)  T(F): 98.3 (03 Apr 2018 07:38), Max: 99.4 (02 Apr 2018 23:49)  HR: 85 (03 Apr 2018 07:38) (71 - 100)  BP: 135/82 (03 Apr 2018 07:38) (117/51 - 145/92)  BP(mean): --  RR: 20 (03 Apr 2018 07:38) (20 - 20)  SpO2: 97% (03 Apr 2018 07:38) (97% - 100%)        REVIEW OF SYSTEMS: Non Verbal  Limited or unable to obtain secondary to patient's poor mental status.      On Neurological Examination:     Pupils bilaterally were 2 mm, slightly reactive.  The patient appears to have signs of cataract in her eyes.      Speech non verbal     The patient does not follow any commands.     Motor:  I attempted to elevate bilateral upper extremities, the patient would actively resist, pull both arms down, would say overall was 3/5 left . but right arm did appear weaker 2/5 flexation at elbows only   Bilateral lower extremities with plantar stimulation, positive flexation and withdrawal to hip and knee left 3/5 right 1/5.      GENERAL Exam: Nontoxic , No Acute Distress   	  HEENT:  normocephalic, atraumatic  		  LUNGS:   Decreased bilaterally  	  HEART: Normal S1S2   No murmur RRR        	  GI/ ABDOMEN:  Soft  Non tender    EXTREMITIES:   left hand swollen     MUSCULOSKELETAL: decreased Range of Motion all 4 extremities   	   SKIN: Normal  No Ecchymosis                  LABS:  CBC Full  -  ( 03 Apr 2018 06:07 )  WBC Count : 8.0 K/uL  Hemoglobin : 9.5 g/dL  Hematocrit : 28.3 %  Platelet Count - Automated : 155 K/uL  Mean Cell Volume : 90.5 fl  Mean Cell Hemoglobin : 30.5 pg  Mean Cell Hemoglobin Concentration : 33.7 gm/dL  Auto Neutrophil # : x  Auto Lymphocyte # : x  Auto Monocyte # : x  Auto Eosinophil # : x  Auto Basophil # : x  Auto Neutrophil % : x  Auto Lymphocyte % : x  Auto Monocyte % : x  Auto Eosinophil % : x  Auto Basophil % : x      04-03    138  |  99  |  35<H>  ----------------------------<  109<H>  3.3<L>   |  32<H>  |  1.00    Ca    7.8<L>      03 Apr 2018 06:07      Hemoglobin A1C:       Vitamin B12         RADIOLOGY    ANALYSIS AND PLAN:  An 86-year-old with a history of cerebrovascular accident, dementia, and seizure.  1.	For status epilepticus, the patient's risk factors for seizures could be a secondary to stroke and also dementia.  2.	We will give the patient Depakene started on 500 twice a day will repeat levels may need to adjust dose  EEG noted will add Vimpat repeat EEG   3.	I would recommend Ativan 1 mg IV push q.6h. p.r.n. for any type of breakthrough seizure.  4.	Seizure precautions.  5.	CD noted   6.	For history of dementia secondary to the patient's age and appeared to be advanced, supportive therapy.  7.	For cerebrovascular accident, it appeared that the patient may have a history of AFib, but also has a history of subdural hematoma.  The patient is currently on plavix/ statin ..  8.	 spoke to daughter in detail, she does understand high risk of repeated CVA. She understands risk vs benefits.    9.	Spoke with daughter at bedside 4/3/18- [phone]. cell   10.	overall no change in exam   11.	neurology wise stable   12.	possible palliative care     Thank you for the courtesy of this consultation.      Greater than 40 minutes spent in direct patient care reviewing  the notes, lab data/ imaging , discussion with multidisciplinary team.

## 2018-04-03 NOTE — DISCHARGE NOTE ADULT - NS AS DC INITIAL [facility] NOT MET FT
seizure activity was noted initially, CVA was not initially suspected. The following day, CVA was suspected based on clinical examination

## 2018-04-03 NOTE — DISCHARGE NOTE ADULT - INSTRUCTIONS
Resume peg feeds (Jevity 1.2 Joe gastrostomy feeds, total feeds 990mL, total tube feed time is 18 hours w/ goal feed rate at 55cc/hr)

## 2018-04-03 NOTE — DISCHARGE NOTE ADULT - HOSPITAL COURSE
HPI:  86y F from [facility] with PMH Afib (digoxin, metoprolol, not on AC), ?CKD, dementia (nonverbal, PEG tube placed 2/2018), constipation, HTN, CHF (EF 50% 2/2018), subdural hematoma, HLD, presents to [place] ED with 2 seizure episodes this morning.  History is limited as patient is non-verbal and daughter was not present for seizures.  Daughter was told seizures lasted "a couple" of minutes, and that a third episode occurred during transport to hospital, lasting <2min and treated with Versed 5mg.  No further seizure activity has been witnessed and per daughter patient is currently at baseline.  No signs of pain.  No known trauma or recent illness.  Unable to obtain ROS 2/2 pt mental status.  Of note, patient was discharged from [place] 2/2018 after having a single seizure episode after which she was unable to speak/eat.  PEG tube placed at that time.  She was not placed on any epileptic medications at discharge.    ED Course:   Vitals on presentation: T97.4F (skin), HR 73, /67, RR 18, SpO2 100% on RA.  Labs: WBC 4.2, Hg 9.7, Na 146, Cl 111, Total protein 5.8, albumin 2.7.  Tani (-) x1.  UA (-).  Dig level WNL. EKG: Afib @ 57bpm.  CT head: no acute intracranial pathology.  Chronic infarcts in right occipital lobe, left posterior parietal lobe similar to prior.  Multifocal chronic lacunar infarcts in b/l basal ganglia and thalamus similar to prior.  No acute infarct/hemorrhage. B/l subdural hygromas/chronic subdural hematomas over the convexities similar to prior.  CXR: marked cardiomegaly.  No active pleural-parenchymal process.  Pt was given 1l NS bolus x1, valproate 1g IV x1.  Blood/urine cultures collected.  Seen by Dr. Sommer in ED. (30 Mar 2018 12:46)        Patient was admitted to the telemetry floor for evaluation of seizure activity from long term facility ([facility]). She had seizure at facility and then again en route to the hospital. She was started on depakote in the ED. CT scan showed stable chronic findings. MRI brain was ordered which noted  small LEFT parietal cortical acute infarction with restricted diffusion. No evidence of associated hemorrhage. The patient was changed from daily full dose aspirin to 75mg of plavix. Patient underwent EEG which showed Abnormal sleeping EEG  due to sharp, spike wave discharges from the left hemisphere. Vimpat was added to patient's regimen at recommendation of neurology. Repeat EEG showed IMPRESSION: Normal sleeping EEG.. COMMENT: A video 24-48 hours EEG might be desirable to further evaluate for possible seizure disorder, if clinically necessary. This EEG does not exclude the clinical diagnosis of seizures or epilepsy. EEG from 4/2/2018 showed sharp's and spike and wave from the left temporal hemisphere these appear to have resolved. It was recommended that patient continue depakote, vimpat and continue plavix. Patient has underlying atrial fibrillation but is not a candidate for therapeutic AC due to history of subdural hemorrhage and increased fall risk. Multiple family meetings took place at the bedside with the pt's daughter (health care proxy) and she agreed to have a goals of care conversation with our palliative care representatives. She elects for comfort care measures and for dispo back to [facility]. MOLST form was signed to reflect the patient's wishes. Discussed this w/ neurology as well who agrees w/ d/c w/ comfort care measures at [facility]. Patient will follow up with the staff physician at [facility] w/ continuing of listed medications.     On day of discharge, the pt is nonverbal, minimally responsive to even painful stimuli.     The total amount of time spent reviewing the hospital notes, laboratory values, imaging findings, assessing/counseling the patient, discussing with consultant physicians, social work, nursing staff took 65 minutes    Unable to obtain ROS as patient is nonverbal    PE:  Vital Signs Last 24 Hrs  T(C): 36.4 (03 Apr 2018 13:01), Max: 37.4 (02 Apr 2018 23:49)  T(F): 97.6 (03 Apr 2018 13:01), Max: 99.4 (02 Apr 2018 23:49)  HR: 70 (03 Apr 2018 13:01) (70 - 92)  BP: 130/76 (03 Apr 2018 13:01) (117/51 - 145/92)  BP(mean): --  RR: 20 (03 Apr 2018 13:01) (20 - 20)  SpO2: 100% (03 Apr 2018 13:01) (97% - 100%)    GENERAL: patient appears comfortable, no acute distress  EYES: sclera clear, no exudates, EOMI, PERRL  ENMT: oropharynx clear without erythema, no exudates, dry mucous membranes  NECK: supple, soft, no thyromegaly noted  LUNGS: good air entry bilaterally, clear to auscultation, symmetric breath sounds, no wheezing or rhonchi appreciated  HEART: soft S1/S2, irregular rhythm, bradycardic, no murmurs noted, no lower extremity edema  GASTROINTESTINAL: abdomen is soft, nontender, nondistended, normoactive bowel sounds, no palpable masses  INTEGUMENT: good skin turgor, no lesions noted  MUSCULOSKELETAL: no clubbing or cyanosis, no obvious deformity  NEUROLOGIC: appears sedated, unable to assess motor strength as she is not following commands however she appears to be moving both legs and LUE. RUE appears weak but unable to properly assess muscle strength, responsive to noxious stimuli  PSYCHIATRIC: unable to assess  HEME/LYMPH: no palpable supraclavicular nodules, no obvious ecchymosis or petechiae    Final D/c Dx:   1- Acute CVA (ischemic)  2- H/o hemorrhagic CVA  3- Afib (off AC due to bleeding risk)  4- Seizure disorder  5- Cardiomyopathy, preserved ejection fraction of 50%  6- Normocytic anemia  7- Dysphagia s/p peg  8- Essential HTN  9- Dementia

## 2018-04-03 NOTE — CHART NOTE - NSCHARTNOTEFT_GEN_A_CORE
Pt stable for discharge. Please see discharge note for additional information regarding the hospital course and the day of discharge. Pt stable for discharge. Please see discharge note for additional information regarding the hospital course and the day of discharge.      20 minutes were spent discussing advanced care planning. The time spent discussing advanced care planning was separate from the remainder of the encounter. The total time spent for the encounter which is reflected below, does not include the time spent specifically discussing advanced care planning. At this time, the patient has made the informed decision to elect for do not resuscitate (DNR). The MOLST was signed and left in the paper chart for scanning. Patient elects for comfort care measures.

## 2018-04-03 NOTE — DISCHARGE NOTE ADULT - CARE PLAN
Principal Discharge DX:	Acute CVA (cerebrovascular accident)  Goal:	stabilize symptoms  Assessment and plan of treatment:	- please adhere to new medication regimen  - please have follow up visit with PCP in the next 1-2 weeks  -  Secondary Diagnosis:	Seizures  Secondary Diagnosis:	Atrial fibrillation, unspecified type

## 2018-04-03 NOTE — DISCHARGE NOTE ADULT - PLAN OF CARE
stabilize symptoms - please adhere to new medication regimen  - please have follow up visit with PCP in the next 1-2 weeks  -

## 2018-04-04 LAB
CULTURE RESULTS: SIGNIFICANT CHANGE UP
CULTURE RESULTS: SIGNIFICANT CHANGE UP
SPECIMEN SOURCE: SIGNIFICANT CHANGE UP
SPECIMEN SOURCE: SIGNIFICANT CHANGE UP

## 2018-08-07 NOTE — INPATIENT CERTIFICATION FOR MEDICARE PATIENTS - IN ORDER TO MEET MEDICARE REQUIREMENTS.
Resume the Prozac 20 mg daily for 7 days, then increase to 30 mg daily by adding a 10 mg capsule.    Return to clinic in one month    We will call you with the results of your labs    In order to meet Medicare requirements, the clinical documentation must support the information cited in the admission order.  Please be sure to provide detailed and clear documentation about the following in the admitting note/history and physical:

## 2018-10-07 NOTE — H&P ADULT - PMH
Age related osteoporosis    Atrial fibrillation    Atrial fibrillation    Chronic kidney disease  stage 3 moderate  Chronic pain    Constipation    Dementia    Dry eye    Generalized anxiety disorder    GERD (gastroesophageal reflux disease)    Heart failure    HTN (hypertension)    HTN (hypertension)    Hyperlipidemia    Iron deficiency anemia    Osteoarthritis    RA (rheumatoid arthritis)    Subdural hematoma No

## 2019-03-02 NOTE — DISCHARGE NOTE ADULT - DISCHARGE DATE
Problem: Potential for Falls  Goal: Patient will remain free of falls  Description  INTERVENTIONS:  - Assess patient frequently for physical needs  -  Identify cognitive and physical deficits and behaviors that affect risk of falls  -  [place] fall precautions as indicated by assessment   - Educate patient/family on patient safety including physical limitations  - Instruct patient to call for assistance with activity based on assessment  - Modify environment to reduce risk of injury  - Consider OT/PT consult to assist with strengthening/mobility    Outcome: Progressing     Problem: Prexisting or High Potential for Compromised Skin Integrity  Goal: Skin integrity is maintained or improved  Description  INTERVENTIONS:  - Identify patients at risk for skin breakdown  - Assess and monitor skin integrity  - Assess and monitor nutrition and hydration status  - Monitor labs (i e  albumin)  - Assess for incontinence   - Turn and reposition patient  - Assist with mobility/ambulation  - Relieve pressure over bony prominences  - Avoid friction and shearing  - Provide appropriate hygiene as needed including keeping skin clean and dry  - Evaluate need for skin moisturizer/barrier cream  - Collaborate with interdisciplinary team (i e  Nutrition, Rehabilitation, etc )   - Patient/family teaching   Outcome: Progressing     Problem: Nutrition/Hydration-ADULT  Goal: Nutrient/Hydration intake appropriate for improving, restoring or maintaining nutritional needs  Description  Monitor and assess patient's nutrition/hydration status for malnutrition (ex- brittle hair, bruises, dry skin, pale skin and conjunctiva, muscle wasting, smooth red tongue, and disorientation)  Collaborate with interdisciplinary team and initiate plan and interventions as ordered  Monitor patient's weight and dietary intake as ordered or per policy  Utilize nutrition screening tool and intervene per policy   Determine patient's food preferences and provide 03-Apr-2018 high-protein, high-caloric foods as appropriate  INTERVENTIONS:  - Monitor oral intake, urinary output, labs, and treatment plans  - Assess nutrition and hydration status and recommend course of action  - Evaluate amount of meals eaten  - Assist patient with eating if necessary   - Allow adequate time for meals  - Recommend/ encourage appropriate diets, oral nutritional supplements, and vitamin/mineral supplements  - Order, calculate, and assess calorie counts as needed  - Recommend, monitor, and adjust tube feedings and TPN/PPN based on assessed needs  - Assess need for intravenous fluids  - Provide specific nutrition/hydration education as appropriate  - Include patient/family/caregiver in decisions related to nutrition   Outcome: Progressing     Problem: CARDIOVASCULAR - ADULT  Goal: Maintains optimal cardiac output and hemodynamic stability  Description  INTERVENTIONS:  - Monitor I/O, vital signs and rhythm  - Monitor for S/S and trends of decreased cardiac output i e  bleeding, hypotension  - Administer and titrate ordered vasoactive medications to optimize hemodynamic stability  - Assess quality of pulses, skin color and temperature  - Assess for signs of decreased coronary artery perfusion - ex   Angina  - Instruct patient to report change in severity of symptoms   Outcome: Progressing  Goal: Absence of cardiac dysrhythmias or at baseline rhythm  Description  INTERVENTIONS:  - Continuous cardiac monitoring, monitor vital signs, obtain 12 lead EKG if indicated  - Administer antiarrhythmic and heart rate control medications as ordered  - Monitor electrolytes and administer replacement therapy as ordered   Outcome: Progressing     Problem: METABOLIC, FLUID AND ELECTROLYTES - ADULT  Goal: Electrolytes maintained within normal limits  Description  INTERVENTIONS:  - Monitor labs and assess patient for signs and symptoms of electrolyte imbalances  - Administer electrolyte replacement as ordered  - Monitor response to electrolyte replacements, including repeat lab results as appropriate  - Instruct patient on fluid and nutrition as appropriate   Outcome: Progressing  Goal: Fluid balance maintained  Description  INTERVENTIONS:  - Monitor labs and assess for signs and symptoms of volume excess or deficit  - Monitor I/O and WT  - Instruct patient on fluid and nutrition as appropriate   Outcome: Progressing     Problem: PAIN - ADULT  Goal: Verbalizes/displays adequate comfort level or baseline comfort level  Description  Interventions:  - Encourage patient to monitor pain and request assistance  - Assess pain using appropriate pain scale  - Administer analgesics based on type and severity of pain and evaluate response  - Implement non-pharmacological measures as appropriate and evaluate response  - Consider cultural and social influences on pain and pain management  - Notify physician/advanced practitioner if interventions unsuccessful or patient reports new pain   Outcome: Progressing     Problem: INFECTION - ADULT  Goal: Absence or prevention of progression during hospitalization  Description  INTERVENTIONS:  - Assess and monitor for signs and symptoms of infection  - Monitor lab/diagnostic results  - Monitor all insertion sites, i e  indwelling lines, tubes, and drains  - Monitor endotracheal (as able) and nasal secretions for changes in amount and color  - [place] appropriate cooling/warming therapies per order  - Administer medications as ordered  - Instruct and encourage patient and family to use good hand hygiene technique  - Identify and instruct in appropriate isolation precautions for identified infection/condition    Outcome: Progressing     Problem: SAFETY ADULT  Goal: Patient will remain free of falls  Description  INTERVENTIONS:  - Assess patient frequently for physical needs  -  Identify cognitive and physical deficits and behaviors that affect risk of falls    -  [place] fall precautions as indicated by assessment   - Educate patient/family on patient safety including physical limitations  - Instruct patient to call for assistance with activity based on assessment  - Modify environment to reduce risk of injury  - Consider OT/PT consult to assist with strengthening/mobility    Outcome: Progressing  Goal: Maintain or return to baseline ADL function  Description  INTERVENTIONS:  -  Assess patient's ability to carry out ADLs; assess patient's baseline for ADL function and identify physical deficits which impact ability to perform ADLs (bathing, care of mouth/teeth, toileting, grooming, dressing, etc )  - Assess/evaluate cause of self-care deficits   - Assess range of motion  - Assess patient's mobility; develop plan if impaired  - Assess patient's need for assistive devices and provide as appropriate  - Encourage maximum independence but intervene and supervise when necessary  ¯ Involve family in performance of ADLs  ¯ Assess for home care needs following discharge   ¯ Request OT consult to assist with ADL evaluation and planning for discharge  ¯ Provide patient education as appropriate    Outcome: Progressing  Goal: Maintain or return mobility status to optimal level  Description  INTERVENTIONS:  - Assess patient's baseline mobility status (ambulation, transfers, stairs, etc )    - Identify cognitive and physical deficits and behaviors that affect mobility  - Identify mobility aids required to assist with transfers and/or ambulation (gait belt, sit-to-stand, lift, walker, cane, etc )  - [place] fall precautions as indicated by assessment  - Record patient progress and toleration of activity level on Mobility SBAR; progress patient to next Phase/Stage  - Instruct patient to call for assistance with activity based on assessment  - Request Rehabilitation consult to assist with strengthening/weightbearing, etc     Outcome: Progressing     Problem: DISCHARGE PLANNING  Goal: Discharge to home or other facility with appropriate resources  Description  INTERVENTIONS:  - Identify barriers to discharge w/patient and caregiver  - Arrange for needed discharge resources and transportation as appropriate  - Identify discharge learning needs (meds, wound care, etc )  - Arrange for interpretive services to assist at discharge as needed  - Refer to Case Management Department for coordinating discharge planning if the patient needs post-hospital services based on physician/advanced practitioner order or complex needs related to functional status, cognitive ability, or social support system   Outcome: Progressing     Problem: Knowledge Deficit  Goal: Patient/family/caregiver demonstrates understanding of disease process, treatment plan, medications, and discharge instructions  Description  Complete learning assessment and assess knowledge base    Interventions:  - Provide teaching at level of understanding  - Provide teaching via preferred learning methods   Outcome: Progressing     Problem: GENITOURINARY - ADULT  Goal: Maintains or returns to baseline urinary function  Description  INTERVENTIONS:  - Assess urinary function  - Encourage oral fluids to ensure adequate hydration  - Administer IV fluids as ordered to ensure adequate hydration  - Administer ordered medications as needed  - Offer frequent toileting  - Follow urinary retention protocol if ordered   Outcome: Progressing  Goal: Absence of urinary retention  Description  INTERVENTIONS:  - Assess patient?s ability to void and empty bladder  - Monitor I/O  - Bladder scan as needed  - Discuss with physician/AP medications to alleviate retention as needed  - Discuss catheterization for long term situations as appropriate   Outcome: Progressing     Problem: DISCHARGE PLANNING - CARE MANAGEMENT  Goal: Discharge to post-acute care or home with appropriate resources  Description  INTERVENTIONS:  - Conduct assessment to determine patient/family and health care team treatment goals, and need for post-acute services based on payer coverage, community resources, and patient preferences, and barriers to discharge  - Address psychosocial, clinical, and financial barriers to discharge as identified in assessment in conjunction with the patient/family and health care team  - Arrange appropriate level of post-acute services according to patient's   needs and preference and payer coverage in collaboration with the physician and health care team  - Communicate with and update the patient/family, physician, and health care team regarding progress on the discharge plan  - Arrange appropriate transportation to post-acute venues  - Pt to d/c with appropriate resources when medically stable     Outcome: Progressing

## 2021-04-21 NOTE — ED ADULT NURSE NOTE - FINAL NURSING ELECTRONIC SIGNATURE
"Planning on discharge tomorrow if we can get the home IV abx arranged. If questions, please call RUSS Bailey CM at [phone].  Thank you      Nadir Alanis Sr. (80 y.o. Male)     Date of Birth Social Security Number Address Home Phone MRN    1941  [address] [facility] [address] [phone] [id number]    Congregation Marital Status          Mu-ism        Admission Date Admission Type Admitting Provider Attending Provider Department, Room/Bed    4/16/21 Emergency Manohar Mary MD Craig, David A, MD [address], 333/1    Discharge Date Discharge Disposition Discharge Destination                       Attending Provider: Manohar Mary MD    Allergies: Contrast Dye    Isolation: Contact   Infection: ESBL E coli (04/20/21)   Code Status: No CPR    Ht: 193 cm (75.98\")   Wt: 90.1 kg (198 lb 9.6 oz)    Admission Cmt: None   Principal Problem: Sepsis (CMS/[facility]) [A41.9]                 Active Insurance as of 4/16/2021     Primary Coverage     Payor Plan Insurance Group Employer/Plan Group    MEDICARE MEDICARE A & B      Payor Plan Address Payor Plan Phone Number Payor Plan Fax Number Effective Dates    [address] [phone]  11/1/1976 - None Entered    [facility] [id number]       Subscriber Name Subscriber Birth Date Member ID       NADIR ALANIS SR. 1941 [id number]           Secondary Coverage     Payor Plan Insurance Group Employer/Plan Group    [facility] [id number]     Payor Plan Address Payor Plan Phone Number Payor Plan Fax Number Effective Dates    [address] [address]   1/23/2019 - None Entered    [facility] [id number]       Subscriber Name Subscriber Birth Date Member ID       NADIR ALANIS SR. 1941 [id number]                 Emergency Contacts      (Rel.) Home Phone Work Phone Mobile Phone    ZekeLamar (Spouse) [phone] -- [phone]    ZekeCammy (Daughter) [phone] -- [phone]            "         Insurance Information                MEDICARE/MEDICARE A & B Phone: [phone]    Subscriber: Ondina Alanis . Subscriber#: [id number]    Group#:  Precert#:         UMWA/UMWA Phone:     Subscriber: Ondina Alanis Sr. Subscriber#: [id number]    Group#: [id number] Precert#:              History & Physical      Manohar Mary MD at 04/16/21 1356                [facility] Medicine Admission      Date of Admission: 4/16/2021      Primary Care Physician: Mark Shelodn APRN      Chief Complaint: Shortness of breath    HPI: Patient is a 80-year-old male with past medical history notable for hypertension, hyperlipidemia, type 2 diabetes mellitus, CKD 3B, BPH, and CAD who presented to the emergency department due to worsening shortness of breath and reports of hypothermia at home.  Due to patient's dementia history is obtained from his wife.  She notes that he has been declining over the past week or so.  She states that he is essentially bedbound but has also been developing worsening edema in his lower extremities.  She states that he is not having any fevers but has had low body temperatures.  She notes he is also been developing some worsening shortness of breath.  She states that he will randomly talk to himself and talk to people that are not there at home.    Evaluation of the emergency department was notable for CBC which showed a WBC of 2.91, hemoglobin 10.9, hematocrit 33, platelets at 115.  D-dimer was elevated at 1071.  PTT was 86.  PT was 111.1 with an INR of 13.24.  Troponin T was 0.16 which is improved from 6 months ago when it was 0.241.  proBNP was elevated at 65,746.  CMP showed glucose of 132, BUN 48, creatinine 2.47, chloride 110, calcium 8.3, albumin 2.7.  Covid and influenza screen was negative.  Lactate was normal at 1.2.  Chest x-ray showed cardiomegaly with interval development of bilateral infiltrative changes suggesting pulmonary  edema and/or bilateral pneumonitis.    Patient was given Rocephin and azithromycin along with vitamin K.  Vital signs in the ER were notable for temp of 94.2, pulse 52, /81 with a normal SPO2 on room air.  Admission was requested for further evaluation and care.    Concurrent Medical History:  has a past medical history of Asthma, Benign prostatic hyperplasia, CHF (congestive heart failure) (CMS/[facility]), Coronary artery disease, Diabetes mellitus (CMS/[facility]), GERD (gastroesophageal reflux disease), Hyperlipidemia, Hypertension, Prostate disorder, Renal insufficiency, and Urinary tract infection.    Past Surgical History:  has a past surgical history that includes Back surgery; Prostate surgery; Knee surgery (Left); and Cystoscopy (N/A, 2/5/2021).    Family History: family history is not on file.  No changes    Social History:  reports that he has never smoked. He does not have any smokeless tobacco history on file. He reports that he does not drink alcohol and does not use drugs.    Allergies:   Allergies   Allergen Reactions   • Contrast Dye        Medications:   Prior to Admission medications    Medication Sig Start Date End Date Taking? Authorizing Provider   acetaminophen (TYLENOL) 325 MG tablet Take 2 tablets by mouth Every 6 (Six) Hours As Needed for Mild Pain . 10/13/20   Nicole Canseco APRN   atorvastatin (LIPITOR) 40 MG tablet Take 1 tablet by mouth Every Night. 8/28/20   Anup Atkinson MD   Bacitracin Zinc (magic butt ointment) Apply 1 each topically to the appropriate area as directed 2 (Two) Times a Day. 10/13/20   Nicole Canseco APRN   bisacodyl (DULCOLAX) 5 MG EC tablet Take 1 tablet by mouth Daily As Needed for Constipation. 10/13/20   Nicole Canseco APRN   brimonidine (ALPHAGAN P) 0.1 % solution ophthalmic solution 1 drop. 1/26/16   Selina Rosario MD   brinzolamide (AZOPT) 1 % ophthalmic suspension 1 drop. 1/26/16   Selina Rosario MD   budesonide (PULMICORT) 0.5 MG/2ML  nebulizer solution Inhale 0.5 mg. 1/26/16   Selina Rosario MD   hydrALAZINE (APRESOLINE) 50 MG tablet Take 1 tablet by mouth Every 8 (Eight) Hours. 2/9/21   Ralph Weaver APRN   insulin aspart (novoLOG) 100 UNIT/ML injection Inject 0-7 Units under the skin into the appropriate area as directed 3 (Three) Times a Day Before Meals. 8/28/20   Anup Atkinson MD   insulin detemir (Levemir) 100 UNIT/ML injection Inject 10 Units under the skin into the appropriate area as directed Every 12 (Twelve) Hours. 8/28/20   Anup Atkinson MD   ipratropium (ATROVENT) 0.02 % nebulizer solution Take 2.5 mL by nebulization Every 4 (Four) Hours As Needed for Wheezing or Shortness of Air. 8/28/20   Anup Atkinson MD   ipratropium-albuterol (DUO-NEB) 0.5-2.5 mg/3 ml nebulizer Take 3 mL by nebulization Every 4 (Four) Hours As Needed for Wheezing or Shortness of Air. 10/13/20   Nicole Canseco APRN   isosorbide mononitrate (IMDUR) 30 MG 24 hr tablet Take 1 tablet by mouth Daily. 8/29/20   Anup Aktinson MD   lansoprazole (PREVACID) 30 MG capsule Take 30 mg by mouth. 4/10/17   Selina Rosario MD   latanoprost (XALATAN) 0.005 % ophthalmic solution 1 drop. 1/26/16   Selina Rosario MD   Misc. Devices (TRANSFER BENCH) misc Transfer bench r/t dementia, deconditioning. 6/8/17   Nicole Canseco APRN   [facility]. Devices (TUB TRANSFER BOARD) [place] Transfer tub bench r/t deconditioning, dementia, and blind r/t glaucoma 6/9/17   Nicole Canseco APRN   ondansetron (ZOFRAN) 4 MG/2ML injection Infuse 2 mL into a venous catheter Every 6 (Six) Hours As Needed for Nausea or Vomiting. 10/13/20   Nicole Canseco APRN       Review of Systems:  Review of Systems   Constitutional: Negative for chills and fever.   HENT: Negative for congestion.    Eyes: Negative.    Respiratory: Positive for shortness of breath. Negative for cough.    Cardiovascular: Negative for chest pain and palpitations.   Gastrointestinal:  Negative for abdominal pain, constipation, diarrhea, nausea and vomiting.   Genitourinary: Negative.    Musculoskeletal: Negative.         Bilateral lower extremity edema   Skin: Negative for rash.   Neurological: Negative.    Psychiatric/Behavioral: Negative.    All other systems reviewed and are negative.     Otherwise complete ROS is negative except as mentioned above.    Physical Exam:   Temp:  [94.2 °F (34.6 °C)-94.6 °F (34.8 °C)] 94.2 °F (34.6 °C)  Heart Rate:  [] 52  Resp:  [16-17] 17  BP: (143-181)/(67-81) 181/81  Physical Exam  Constitutional:       General: He is not in acute distress.     Appearance: He is not toxic-appearing.   HENT:      Head: Normocephalic and atraumatic.      Right Ear: External ear normal.      Left Ear: External ear normal.      Nose: Nose normal.      Mouth/Throat:      Mouth: Mucous membranes are moist.      Pharynx: Oropharynx is clear.   Eyes:      Conjunctiva/sclera: Conjunctivae normal.   Cardiovascular:      Rate and Rhythm: Normal rate and regular rhythm.      Pulses: Normal pulses.      Heart sounds: Normal heart sounds.   Pulmonary:      Comments: Diminished coarse breath sounds bilaterally with crackles at the bases.  Abdominal:      General: Bowel sounds are normal.      Tenderness: There is no abdominal tenderness.   Musculoskeletal:      Cervical back: Neck supple.      Right lower leg: Edema present.      Left lower leg: Edema present.   Skin:     General: Skin is warm and dry.      Capillary Refill: Capillary refill takes less than 2 seconds.   Neurological:      Comments: Pleasantly demented   Psychiatric:      Comments: Pleasantly demented           Results Reviewed:  I have personally reviewed current lab, radiology, and data and agree with results.  Lab Results (last 24 hours)     Procedure Component Value Units Date/Time    Procalcitonin [[id number]] Collected: 04/16/21 1505    Specimen: Blood Updated: 04/16/21 1704    COVID-19 and FLU A/B PCR - Swab,  Nasopharynx [[id number]]  (Normal) Collected: 04/16/21 1554    Specimen: Swab from Nasopharynx Updated: 04/16/21 1703     COVID19 Not Detected     Influenza A PCR Not Detected     Influenza B PCR Not Detected    Narrative:      Fact sheet for providers: https://www.fda.gov/media/329009/download    Fact sheet for patients: https://www.fda.gov/media/049078/download    Test performed by PCR.    Lactic Acid, Plasma [[id number]]  (Normal) Collected: 04/16/21 1603    Specimen: Blood Updated: 04/16/21 1627     Lactate 1.2 mmol/L     [place] Draw [[id number]] Collected: 04/16/21 1505    Specimen: Blood Updated: 04/16/21 1615    Narrative:      The following orders were created for panel order [place] Draw.  Procedure                               Abnormality         Status                     ---------                               -----------         ------                     Light Blue Top[[id number]]                                   Final result               Green Top (Gel)[[id number]]                                  Final result               Lavender Top[[id number]]                                     Final result               Gold Top - SST[[id number]]                                   Final result                 Please view results for these tests on the individual orders.    Gold Top - SST [[id number]] Collected: 04/16/21 1505    Specimen: Blood Updated: 04/16/21 1615     Extra Tube Hold for add-ons.     Comment: Auto resulted.       Light Blue Top [[id number]] Collected: 04/16/21 1505    Specimen: Blood Updated: 04/16/21 1615     Extra Tube hold for add-on     Comment: Auto resulted       Green Top (Gel) [[id number]] Collected: 04/16/21 1505    Specimen: Blood Updated: 04/16/21 1615     Extra Tube Hold for add-ons.     Comment: Auto resulted.       Lavender Top [[id number]] Collected: 04/16/21 1505    Specimen: Blood Updated: 04/16/21 1615     Extra Tube hold for add-on     Comment: Auto resulted       CBC &  Differential [[id number]]  (Abnormal) Collected: 04/16/21 1505    Specimen: Blood Updated: 04/16/21 1610    Narrative:      The following orders were created for panel order CBC & Differential.  Procedure                               Abnormality         Status                     ---------                               -----------         ------                     Scan Slide[[id number]]                                       Final result               CBC Auto Differential[[id number]]        Abnormal            Final result                 Please view results for these tests on the individual orders.    Scan Slide [[id number]] Collected: 04/16/21 1505    Specimen: Blood Updated: 04/16/21 1610     Acanthocytes Slight/1+     Anisocytosis Slight/1+     Hypochromia Slight/1+     Target Cells Slight/1+     WBC Morphology Normal     Platelet Estimate Decreased    aPTT [[id number]]  (Abnormal) Collected: 04/16/21 1505    Specimen: Blood Updated: 04/16/21 1603     PTT 86.0 seconds     Narrative:      The recommended Heparin therapeutic range is 68-97 seconds.    D-dimer, Quantitative [[id number]]  (Abnormal) Collected: 04/16/21 1505    Specimen: Blood Updated: 04/16/21 1603     D-Dimer, Quantitative 1,071 ng/mL (FEU)     Narrative:      Dimer values <500 ng/ml FEU are FDA approved as aid in diagnosis of deep venous thrombosis and pulmonary embolism.  This test should not be used in an exclusion strategy with pretest probability alone.    A recent guideline regarding diagnosis for pulmonary thromboembolism recommends an adjusted exclusion criterion of age x 10 ng/ml FEU for patients >50 years of age (Brenda Intern Med 2015; 163: 701-711).      Protime-INR [[id number]]  (Abnormal) Collected: 04/16/21 1505    Specimen: Blood Updated: 04/16/21 1603     Protime 111.1 Seconds      INR 13.24    Narrative:      Therapeutic range for most indications is 2.0-3.0 INR,  or 2.5-3.5 for mechanical heart valves.    BNP [[id number]]  (Abnormal)  Collected: 04/16/21 1505    Specimen: Blood Updated: 04/16/21 1545     proBNP 65,746.0 pg/mL     Narrative:      Among patients with dyspnea, NT-proBNP is highly sensitive for the detection of acute congestive heart failure. In addition NT-proBNP of <300 pg/ml effectively rules out acute congestive heart failure with 99% negative predictive value.    Results may be falsely decreased if patient taking Biotin.      Troponin [[id number]]  (Abnormal) Collected: 04/16/21 1505    Specimen: Blood Updated: 04/16/21 1538     Troponin T 0.160 ng/mL     Narrative:      Troponin T Reference Range:  <= 0.03 ng/mL-   Negative for AMI  >0.03 ng/mL-     Abnormal for myocardial necrosis.  Clinicians would have to utilize clinical acumen, EKG, Troponin and serial changes to determine if it is an Acute Myocardial Infarction or myocardial injury due to an underlying chronic condition.       Results may be falsely decreased if patient taking Biotin.      Comprehensive Metabolic Panel [[id number]]  (Abnormal) Collected: 04/16/21 1505    Specimen: Blood Updated: 04/16/21 1535     Glucose 132 mg/dL      BUN 48 mg/dL      Creatinine 2.47 mg/dL      Sodium 139 mmol/L      Potassium 4.4 mmol/L      Chloride 110 mmol/L      CO2 27.0 mmol/L      Calcium 8.3 mg/dL      Total Protein 6.8 g/dL      Albumin 2.70 g/dL      ALT (SGPT) 7 U/L      AST (SGOT) 14 U/L      Alkaline Phosphatase 78 U/L      Total Bilirubin 0.4 mg/dL      eGFR   Amer 31 mL/min/1.73      Globulin 4.1 gm/dL      A/G Ratio 0.7 g/dL      BUN/Creatinine Ratio 19.4     Anion Gap 2.0 mmol/L     Narrative:      GFR Normal >60  Chronic Kidney Disease <60  Kidney Failure <15      CBC Auto Differential [[id number]]  (Abnormal) Collected: 04/16/21 1505    Specimen: Blood Updated: 04/16/21 1533     WBC 2.91 10*3/mm3      RBC 4.24 10*6/mm3      Hemoglobin 10.9 g/dL      Hematocrit 33.0 %      MCV 77.8 fL      MCH 25.7 pg      MCHC 33.0 g/dL      RDW 17.9 %      RDW-SD 49.2 fl      MPV  --     Comment: Instrument unable to calculate  Slide scan to follow        Platelets 115 10*3/mm3      Neutrophil % 72.5 %      Lymphocyte % 16.5 %      Monocyte % 7.9 %      Eosinophil % 2.1 %      Basophil % 0.3 %      Immature Grans % 0.7 %      Neutrophils, Absolute 2.11 10*3/mm3      Lymphocytes, Absolute 0.48 10*3/mm3      Monocytes, Absolute 0.23 10*3/mm3      Eosinophils, Absolute 0.06 10*3/mm3      Basophils, Absolute 0.01 10*3/mm3      Immature Grans, Absolute 0.02 10*3/mm3      nRBC 0.0 /100 WBC         Imaging Results (Last 24 Hours)     Procedure Component Value Units Date/Time    XR Chest 1 View [[id number]] Collected: 04/16/21 1522     Updated: 04/16/21 1541    Narrative:      Chest x-ray single view.         CLINICAL INDICATION: Shortness of breath    COMPARISON: Chest February 4, 2021.    FINDINGS: Cardiac silhouette is enlarged in size. Interval  development of bilateral infiltrative changes suggesting  pulmonary edema pattern and/or bilateral pneumonitis. Relative  sparing of portions of the right apex. Small left-sided pleural  effusion.      Impression:      Cardiomegaly. Interval development of bilateral  infiltrative changes suggesting pulmonary edema type pattern  and/or bilateral pneumonitis with relative sparing of the right  apex. Small left-sided pleural effusion. Unfavorable change since  prior exam.    Electronically signed by:  Herbert Clement MD  4/16/2021 3:40 PM CDT  Workstation: [id number]            Assessment:    Active Hospital Problems    Diagnosis    • Sepsis (CMS/HCC)    • Acute on chronic systolic CHF (congestive heart failure) (CMS/HCC)    • Dementia (CMS/HCC)    • CKD (chronic kidney disease) stage 4, GFR 15-29 ml/min (CMS/HCC)    • Diabetes mellitus type II, uncontrolled (CMS/HCC)              Plan:  -Patient will be admitted to the CCU for closer monitoring for now  -We will continue with antibiotic coverage with Rocephin and azithromycin pending further evaluation  -We  will give him a small dose of IV Lasix this evening to diurese him some but nephrology will be consulted to help out with diuretic and fluid management given his underlying CKD 4.  -We will resume his home medications as appropriate  -We will monitor his blood sugars with glucose checks have a sliding scale insulin, his wife reports that he does not take basal insulin anymore  -We will hold his warfarin for the time being and monitor his INRs with daily checks  -We will go ahead and give him some more vitamin K tomorrow.  -As needed Ativan and Haldol will be available for agitation given his underlying dementia  -DVT prophylaxis not indicated at this time given his elevated INR  -CODE STATUS: DNR/DNI which was confirmed with the patient's wife in the emergency department    The patient was evaluated during the global COVID-19 pandemic, and the diagnosis was suspected/considered upon their initial presentation.  Evaluation, treatment, and testing were consistent with current guidelines for patients who present with complaints or symptoms that may be related to COVID-19.      I discussed the patient's findings and my recommendations with: The patient and his wife    Manohar Mary MD            Electronically signed by Manohar Mary MD at 04/16/21 1815     Ambulatory Referral to [facility] [[id number]] (Order [id number])  Order  Date: 4/21/2021 Department: [address] Ordering/Authorizing: Ralph Weaver APRN   Order History  Outpatient  Date/Time Action Taken User Additional Information   04/21/21 1035 Sign Ralph Weaver APRN    Order Details    Frequency Duration Priority Order Class   None None Routine Internal Referral   Start Date/Time    Start Date   04/21/21   Order Information    Order Date Service Start Date Start Time   04/21/21 Medicine 04/21/21    Reference Links    Associated Reports External References   View Encounter Current Health Referral Information    Order Questions    Question Answer Comment   Face to Face Visit Date: 4/21/2021    Follow-up provider for Plan of Care? I treated the patient in an acute care facility and will not continue treatment after discharge.    Follow-up provider: NICK SOTOMAYOR    Reason/Clinical Findings Pneumonia, CHF, ESBL E. coli UTI    Describe mobility limitations that make leaving home difficult: impaired mobility and ADLs, home IV antibiotics    Nursing/Therapeutic Services Requested Skilled Nursing     Physical Therapy     Occupational Therapy    Skilled nursing orders: Medication education     PICC line care/instruction     Infusion therapy    PT orders: Therapeutic exercise     Transfer training     Strengthening     Home safety assessment    Occupational orders: Activities of daily living     Energy conservation     Strengthening     Cognition     Home safety assessment    Frequency: 1 Week 1           Source Order Set / Preference List    Preference List   AMB FACILITY REFERRALS [[id number]]   Clinical Indications     ICD-10-CM ICD-9-CM   Sepsis, due to unspecified organism, unspecified whether acute organ dysfunction present (CMS/[facility])  - Primary     A41.9 038.9  995.91   Acute on chronic systolic congestive heart failure (CMS/[facility])     I50.23 428.23  428.0   Impaired physical mobility     Z74.09 781.99   Impaired mobility and ADLs     Z74.09  Z78.9 V49.89   Urinary tract infection due to extended-spectrum beta lactamase (ESBL) producing Escherichia coli     N39.0  B96.29  Z16.12 599.0  041.49  V09.1   Reprint Order Requisition    Ambulatory Referral to Home Health (Order #[id number]) on 4/21/21   Encounter    View Encounter          Order Provider Info        Office phone Pager E-mail   Ordering User Ralph Weaver APRN [phone] -- --   Authorizing Provider Ralph Weaver APRN [phone] -- --   Attending Provider Manohar Mary [phone] -- --   Linked Charges    No charges linked to this  procedure   Tracking Reports  Cosign Tracking Order Transmittal Tracking   Authorized by:  THIERNO Griffin  (NPI: [id number])       Lab Component SmartPhrase Guide    Ambulatory Referral to Home Health (Order #[id number]) on 4/21/21        06-Feb-2018 09:04 06-Feb-2018 11:49

## 2021-10-07 NOTE — DISCHARGE NOTE ADULT - DATE:
Pt scheduled nurse visit for flu shot and pneumovax. Please place orders for pneumovax.     Future Appointments   Date Time Provider Mic Svetlana   10/14/2021  2:30 PM [facility] 08 NURSE EMG 8 [facility] [place] 04/03/2018

## 2022-02-10 NOTE — INPATIENT CERTIFICATION FOR MEDICARE PATIENTS - THE STATUS OF COMORBIDITIES.
2. The status of comorbities. (See ED/admit documents) Complex Repair And Tissue Cultured Epidermal Autograft Text: The defect edges were debeveled with a #15 scalpel blade.  The primary defect was closed partially with a complex linear closure.  Given the location of the defect, shape of the defect and the proximity to free margins an tissue cultured epidermal autograft was deemed most appropriate to repair the remaining defect.  The graft was trimmed to fit the size of the remaining defect.  The graft was then placed in the primary defect, oriented appropriately, and sutured into place.

## 2022-10-13 NOTE — PHYSICAL THERAPY INITIAL EVALUATION ADULT - ADL SKILLS, REHAB EVAL
normal appearance , without tenderness upon palpation , no deformities , trachea midline , Thyroid normal size , no thyroid nodules , no masses , no JVD , thyroid nontender independent

## 2022-12-05 NOTE — PATIENT PROFILE ADULT. - CAREGIVER PHONE NUMBER
SW contacted pt for post follow up post discharge phone call. Pt did not answer, phone call to pts boyfriend who stated pt is in bed she is not feeling well and has flu like symptoms. He reported that her mood has improved and they have no further concerns. [phone]

## 2023-01-11 NOTE — PHYSICAL THERAPY INITIAL EVALUATION ADULT - PATIENT/FAMILY AGREES WITH PLAN
Spoke with Swetha, she called in. Advised [facility], and that Dr. Garcia wants the pt on 10mg.    She understood and verbalized understanding.   yes

## 2023-02-06 NOTE — CHART NOTE - NSCHARTNOTEFT_GEN_A_CORE
"Occupational Therapy      Patient Name:  Doe Woodall   MRN:  [id number]    Patient not seen today secondary to Other (Pt reported that he just finished working with physical therapy and walked around the unit. Reports hip pain. Pt declined OOB for OT at this time. Pt stated " could you come back later?". Will follow up as able.    2/6/2023  " Called regarding HTN overnight.  Instructed RN to give HTN meds (vasotec and lopressor) as ordered.  BP continued to be elevated and instructed RN to give PRN hydralazine.  Will monitor as needed.

## 2023-06-21 NOTE — PATIENT PROFILE ADULT. - FUNCTIONAL LEVEL PRIOR: TOILETING
Home (4) completely dependent Colchicine Counseling:  Patient counseled regarding adverse effects including but not limited to stomach upset (nausea, vomiting, stomach pain, or diarrhea).  Patient instructed to limit alcohol consumption while taking this medication.  Colchicine may reduce blood counts especially with prolonged use.  The patient understands that monitoring of kidney function and blood counts may be required, especially at baseline. The patient verbalized understanding of the proper use and possible adverse effects of colchicine.  All of the patient's questions and concerns were addressed.

## 2023-10-10 NOTE — ED ADULT NURSE NOTE - AS O2 DELIVERY
10/10/2023         RE: Deejay Dior  [address]  [place] [address]        Dear Colleague,    Thank you for referring your patient, Deejay Dior, to the [facility] [place]. Please see a copy of my visit note below.    BMT Progress Note    ID:  Mr. 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 (In the past has had a basal and squamous cell removed). Assumed new BMT care by Jean-Paul Nunez on 3/7/2022    cGVHD: currently On pred 5mg every day and Jakafi 5 mg bid.        HPI: Deejay returns for follow up.  His biggest concern is again fatigue.  He also reports new dyspnea on exertion, denies any cough or other respiratory concerns.  He has not establish care with PCP.  Recently seen by ophthalmology and ocular symptoms are stable even though not optimally controlled, he is using refresh and serum eyedrops >8 times per day. He is not doing warm compressors and did not like scleral lenses. His mouth is dry-stable for many years, no ulcerations or sores, no choking or other concerns. No n/v/d. Legs skin thickness is about the same and stable for years, no open sores or ulcers, He denies pain, oozing or bleeding. No change or decrease in ROM.     Remainder of 10 pt ROS negative    On exam:  /70   Pulse 73   Temp 97.7  F (36.5  C) (Oral)   Resp 16   Wt 105.8 kg (233 lb 4.8 oz)   SpO2 98%   BMI 36.54 kg/m      HEENT: PERRL, EOMI , dry oral mucosa with no ulcers  Chest: Mild bibasilar crackles  CVS: S1 S2 RRR, no murmurs or gallops  Abdomen: Soft nontender no organomegalies or masses.  Extremities: The left leg has stable sclerotic skin on the shin, no evidence of infection or  "cellulitis. No other sclerotic changes of the skin.  Of note, since I first meet him the patient and he reports that the skin changes have been since previous cellulitis post transplantation. prominent venous status stasis on left and more prominent sclerotic changes on right shin.  CNS: non focal    ASSESSMENT AND PLAN:  Mr. 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 Total cell dose (from 7/1 & 7/2) 6.53 x 10^6 CD34+ cells/kg.   - 1 year anniversary (July 2015): 30% cellular, trilineage hematopoiesis, no abnormal blasts by morphology or flow , no dysplasia, 0-1 fibrosis, 100% donor (BM, CD3, CD15). CR  - 2 year anniversary (July 2016): 20-30% cellular, trilineage hematopoiesis, no abnormal blasts by morphology or flow , no dysplasia, No fibrosis, 100% donor in BM (PB not sent). ISCN: //46,XX[20] Complete Remission.     HEME: No transfusion needs, counts stable, 3/2022 ferritin 44    GVHD: Long history of GVHD as below.   12/2021: kept him on prednisone 5 mg daily (reports that cGVHD flared on lower dose would like to stay at same dose), ruxolitinib 5 mg twice daily.  His counts are tolerating well.    --- Since taking over patient care he repeatedly expressed wanting to stay on the same therapy as he has failed tapering or changes in the past. 4/18/2023 this was readdressed and I also discussed with the patient whether he would like to consider altering/changing his immunosuppression regiment and concerns of potentially Jakafi exacerbating his fatigue.  However both him and his wife expressed strongly that they would like to stay on the current regiment and the patient specifically said that he would rather not \"experiment\" with changing therapies.  No changes today.  However we will pursue further work-up of his dyspnea on exertion including possibility of pulmonary GVHD.  If this is the " case management will be changed accordingly.    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 Had been off prednisone since summer 2017 and briefly tapered off Sirolimus (january 2018), however resumed with flare in February. There was some concern that he had a flare of pulm GVH or sirolimus lung toxicity. Sirolimus was stopped on 9/27/2018 & Pred 90mg was started. Seen by Dr. Sandoval, he does not think his symptoms or CT findings are c/w Sirolimus or GVH & he was in favor of tapering Prednisone. Developed worsening skin thickening & desquamation to the RLE, c/w GVH.   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   Required Burst pred 40mg a day on 1/3/20 with significant systemic arthralgic pain, tapered back to 10/0 eod after 1 week, near resolution of symptoms noted 1/17, returned to 10 mg every other day; then dropped to 5mg q day in April 2021 (this is best dates estimated on chart review)     Ophthalmo notes:  9/2022   1. 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   2. MGD each eye   S/p silicone Punctal plug bilateral lower lid (still in place). 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   Stopped Restasis due to burning.  PLAN:  - Continue serum tears q2h OU  - Switch erythromycin bill to lubricating bill   - Continues with scleral lenses               - Re-start warm compress BID both eyes - not currently using              - trial of tacrolimus ointment QHS OU-- he has no picked thi up  3/14/2023  1. 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   2. MGD each eye   S/p silicone Punctal plug bilateral lower lid (still in place). 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   Stopped Restasis due to  burning.  Also uses CPAP for PIPO     PLAN:  - Continue serum tears q2h OU              - Re-start warm compress BID both eyes - not currently using; educated on proper technique              - Did not like using ointment; continue frequent PFATs              - Humidifier in bedroom               - lotemax (or FML) each eye bid    8/28/2023  GVHD with significant dry eye OU  -Failed: scleral lens, Restasis, lubricating ointment  -Currently using: plugs, serum tears, Refresh. Continue all - he may need more serum tears as he just ran out. He will check with [facility] and they can contact us if needing new Rx  -Left lower lid irritation with keratinization - start Maxitrol bill to affected area bid     ID:  Afebrile no signs/sx of infection.    - IgG 3/8/21003 =904  Thank you  - 5/6/2022 Haleighwillyjudy completed  - Discussed again being up-to-date on COVID vaccines and boosters. 10/10/2023 received COVID booster today in clinic.  He received earlier this month for both influenza and RSV vaccines locally      GI:  protonix (has heartburn)     FEN/Renal: Mild increase in creatinine 3/2022, then normalized 5/2022, mild SARAH again 10/2022, encouraged fluid intake, will avoid NSAIDs for knee pain.  Discussed referral to on-call nephrology would like to hold off for now. Cr improved 0.9 1/17     Fatigue:  stable  - History of testosterone deficiency, rechecked and modestly low. He previously did testosterone injections & didn't think it made him feel any better.    - TSH normal 2/28 and again on repeat 9/27 at 1.01.  - counseled that moderate exercise is really the only known way to combat fatigue, and acknowledged how difficult it is to balance too much with not enough activity. Discussed 1/17 again he declined referral to physical therapy or rehab. Still encouraged him to more more physically active.     Vascular:   10/15/18 Right leg US with small (technically DVT) clot in posterior tibial vein: started reg dose aspirin daily  325mg and recheck US in 2 weeks or symptomatically. U/S on 11/27/2018 without DVT  History of DVT while hospitalized with H1N1 and GVHD in 2016, was on coumadin for 5 months post hospitalization  Now Off lymphedema wraps   H/o chronic venous statis: s/p sclerotherapy to the L leg.      MSK: avascular necrosis of hip.  S/p replacement surgery     Wounds: No open wounds today. He knows that if there is erythema or pain that he needs to be seen as soon as possible to evaluate for cellulitis/infection. *Reiterated this 1/17    Lungs: He reports improved dyspnea on exertion.  PFTs obtained December 2021 stable compared to 3 years ago %, FEV1 107% DL CO 74% predicted.  Of note patient had Covid pneumonia and was on the ventilator.  It has been previously noted that he does have this raspy crackly sounds on both bases that do not change.  He has not had a CT chest since February 2021.  He previously deferred referral to pulmonary. 10/10/2023 we discussed given dyspnea on exertion that he will need CT inspiratory and expiratory for them to look for airway disease in addition to PFTs to differentiate pulmonary GVHD from COPD given his longstanding smoking history and other pulmonary pathology.  We will refer him again to Dr. Sandoval.    Healthcare maintenance March 2022 TSH within normal, IgG within normal, vitamin D deficiency screening normal testosterone mildly low, reminded again today to follow up with PCP locally recheck testosterone in case this is contributing to fatigue, encourage dermatology evaluation yearly, DEXA scan completed 5/6/2022 normal bone density repeat 2 years, discussed age-appropriate cancer screening. Seeing dermatology in September locally. Reminded to see PCP locally for HCM and routine testing, is following up.    Plan:  No changes today per patient's request to immunosuppression therapy  Will refer to Dr. Sandoval and order CT scans and PFTs to be completed the same day per patient's  request    RTC 6 months with Dr Jean-Paul Nunez or earlier as needed depending on pulmonary work-up    35 minutes spent on the date of the encounter doing chart review, history and exam, documentation and further activities per the note    Roselia Nunez MD      room air

## 2023-12-20 NOTE — CHART NOTE - NSCHARTNOTEFT_GEN_A_CORE
Called regarding tachycardia with HR's in the 150s.  Went to see tele monitor and it appears to be afib with RVR.  Patient has chronic afib and is on IV lopressor and digoxin.  Previous HRs ranged from the 80s-100s.  Patient seen and examined.  Patient is demented and only repeats whatever I say.  She does not appear to be in any distress.     MEDICATIONS  (STANDING):  ceFAZolin   IVPB 1000 milliGRAM(s) IV Intermittent once  dextrose 5% + sodium chloride 0.45%. 1000 milliLiter(s) (50 mL/Hr) IV Continuous <Continuous>  digoxin  Injectable 0.125 milliGRAM(s) IV Push daily  enalaprilat Injectable 1.25 milliGRAM(s) IV Push every 6 hours  heparin  Injectable 5000 Unit(s) SubCutaneous every 12 hours  metoprolol    tartrate Injectable 5 milliGRAM(s) IV Push every 4 hours    MEDICATIONS  (PRN):  acetaminophen  Suppository 650 milliGRAM(s) Rectal every 6 hours PRN For Temp greater than 38 C (100.4 F)  hydrALAZINE Injectable 5 milliGRAM(s) IV Push every 6 hours PRN SBP>180  LORazepam   Injectable 0.5 milliGRAM(s) IntraMuscular every 12 hours PRN Agitation      PHYSICAL EXAM:  VS:  /88       GENERAL:     thin, frail, cachectic appearing female speaking in Georgian (and repeating same phrases)  HEAD:     atraumatic, normocephalic  NECK:     supple, no JVD  RESPIRATORY:     grossly clear bilaterally   CARDIOVASCULAR:     irregular irregular   GASTROINTESTINAL:     soft, nontender, nondistended, no hepatosplenomegaly palpated, bowel sounds present  EXTREMITIES:     no clubbing or cyanosis or edema  MUSCULOSKELETAL:     no joint pain or swelling or deformities  SKIN:     no rashes or lesions  PSYCH:     AOx0      LABS:                        13.6   4.9   )-----------( 189      ( 13 Feb 2018 07:56 )             41.0     141    |  105    |  21     ----------------------------<  88       13 Feb 2018 07:56  3.6     |  23     |  1.15       Ca 9.3         13 Feb 2018 07:56      TPro  7.5    /  Alb  3.0<L>  /  TBili  0.6    /  DBili  x      /  AST  18     /  ALT  21     /  AlkPhos  70     13 Feb 2018 07:56    EKG:   afib with RVR (has new ST depressions in V6-V6)    A/P:   86F with dementia, CVA, chronic afib, CKD, who was admitted for behavioral disturbances now with tachycardia, likely afib with RVR.  EKG changes can represent ischemia from elevated heart rate.      - patient will be due for her lopressor 5mg IV (can give early)  - check cardiac enzymes with labs  - not a candidate for heparin drip given history of subdural hematoma, seizures, and fall risk Patient is not pregnant (male or female)
